# Patient Record
Sex: FEMALE | Race: WHITE | HISPANIC OR LATINO | Employment: FULL TIME | URBAN - METROPOLITAN AREA
[De-identification: names, ages, dates, MRNs, and addresses within clinical notes are randomized per-mention and may not be internally consistent; named-entity substitution may affect disease eponyms.]

---

## 2017-01-03 ENCOUNTER — TELEPHONE (OUTPATIENT)
Dept: ENDOCRINOLOGY | Facility: MEDICAL CENTER | Age: 47
End: 2017-01-03

## 2017-01-03 NOTE — TELEPHONE ENCOUNTER
----- Message from Elroy Veras M.D. sent at 1/1/2017  4:50 AM PST -----        Tell family  that her bone density is normal. It should be repeated in 1 year.    Dr Veras

## 2017-04-25 ENCOUNTER — HOSPITAL ENCOUNTER (OUTPATIENT)
Dept: RADIOLOGY | Facility: MEDICAL CENTER | Age: 47
End: 2017-04-25
Attending: NEUROLOGICAL SURGERY
Payer: COMMERCIAL

## 2017-04-25 DIAGNOSIS — D35.2 BENIGN TUMOR OF PITUITARY GLAND (HCC): ICD-10-CM

## 2017-04-25 PROCEDURE — 70553 MRI BRAIN STEM W/O & W/DYE: CPT

## 2017-05-25 LAB
ACTH PLAS-MCNC: 13.9 PG/ML (ref 7.2–63.3)
CORTIS SERPL-MCNC: 7.8 UG/DL
ESTRADIOL SERPL-MCNC: 14.5 PG/ML
FSH SERPL-ACNC: 12.6 MIU/ML
IGF-I SERPL-MCNC: 104 NG/ML (ref 57–195)
PROLACTIN SERPL-MCNC: 77.9 NG/ML (ref 4.8–23.3)
T4 FREE SERPL-MCNC: 1.01 NG/DL (ref 0.82–1.77)
TSH SERPL DL<=0.005 MIU/L-ACNC: 1.57 UIU/ML (ref 0.45–4.5)

## 2017-05-30 ENCOUNTER — OFFICE VISIT (OUTPATIENT)
Dept: ENDOCRINOLOGY | Facility: MEDICAL CENTER | Age: 47
End: 2017-05-30
Payer: COMMERCIAL

## 2017-05-30 VITALS
HEIGHT: 62 IN | OXYGEN SATURATION: 95 % | BODY MASS INDEX: 28.16 KG/M2 | WEIGHT: 153 LBS | DIASTOLIC BLOOD PRESSURE: 70 MMHG | SYSTOLIC BLOOD PRESSURE: 100 MMHG | HEART RATE: 78 BPM

## 2017-05-30 DIAGNOSIS — E23.6 PITUITARY MASS (HCC): Primary | ICD-10-CM

## 2017-05-30 DIAGNOSIS — E22.1 HYPERPROLACTINEMIA (HCC): ICD-10-CM

## 2017-05-30 PROCEDURE — 99214 OFFICE O/P EST MOD 30 MIN: CPT | Performed by: INTERNAL MEDICINE

## 2017-05-30 NOTE — MR AVS SNAPSHOT
"        Amyaubrey Ugalde   2017 4:00 PM   Office Visit   MRN: [id number]    Department:  Endocrinology [facility]   Dept Phone:  [phone]    Description:  Female : 1970   Provider:  Elroy Veras M.D.           Allergies as of 2017     No Known Allergies      You were diagnosed with     Pituitary mass (CMS-HCC)   [271677]  -  Primary     Hyperprolactinemia (CMS-HCC)   [166003]         Vital Signs     Blood Pressure Pulse Height Weight Body Mass Index Oxygen Saturation    100/70 mmHg 78 1.575 m (5' 2\") 69.4 kg (153 lb) 27.98 kg/m2 95%    Smoking Status                   Never Smoker            Basic Information     Date Of Birth Sex Race Ethnicity Preferred Language    1970 Female  or   Origin (Canadian,Ghanaian,Belgian,Damion, etc) Canadian      Your appointments     Dec 04, 2017  4:00 PM   Established Patient with Elroy Veras M.D.   [facility] End[facility]    [id number] [place] [address], [address]  [facility] [address] [phone]           You will be receiving a confirmation call a few days before your appointment from our automated call confirmation system.              Problem List              ICD-10-CM Priority Class Noted - Resolved    Major depression (CMS-[facility]) F32.9   10/3/2013 - Present    Psychosis F29   10/3/2013 - Present    Insomnia G47.00   10/30/2013 - Present    Pituitary mass (CMS-[facility]) E23.7   2012 - Present    Other schizophrenia (CMS-[facility]) F20.89   12/15/2015 - Present    Amenorrhea N91.2   2016 - Present    Bilateral nipple discharge N64.52   2016 - Present    Hyperprolactinemia (CMS-[facility]) E22.1   2017 - Present      Health Maintenance        Date Due Completion Dates    MAMMOGRAM 1/15/2017 1/15/2016, 2013 (Done), 5/10/2006    Override on 2013: Done (Daniel/NL)    PAP SMEAR 2019, 2012 (Done)    Override on 2012: Done (Daniel/NL)    IMM DTaP/Tdap/Td Vaccine (2 - Td) " 6/20/2023 6/20/2013            Current Immunizations     Influenza Vaccine Quad Inj (Preserved) 10/17/2016    MMR/Varicella Combined Vaccine  Incomplete    Tdap Vaccine  Incomplete, 6/20/2013  9:15 PM      Below and/or attached are the medications your provider expects you to take. Review all of your home medications and newly ordered medications with your provider and/or pharmacist. Follow medication instructions as directed by your provider and/or pharmacist. Please keep your medication list with you and share with your provider. Update the information when medications are discontinued, doses are changed, or new medications (including over-the-counter products) are added; and carry medication information at all times in the event of emergency situations     Allergies:  No Known Allergies          Medications  Valid as of: May 30, 2017 -  4:41 PM    Generic Name Brand Name Tablet Size Instructions for use    Citalopram Hydrobromide (Tab) CELEXA 20 MG Take 1 Tab by mouth every day. TOME JON TABLETA POR VIA ORAL TODOS LOS CORONADO        Mometasone Furoate (Suspension) NASONEX 50 MCG/ACT Spray 2 Sprays in nose every day.        RisperiDONE (Tab) RISPERDAL 2 MG Take 1 Tab by mouth every bedtime.        TraZODone HCl (Tab) DESYREL 100 MG Take 1 Tab by mouth every day. Lizeth jon antes de dormirse.        .                 Medicines prescribed today were sent to:     [facility]ARMACY #[id number] - [place], [place] - [address] [place] [address] AT [address] [address]    Phone: [phone] Fax: [phone]    Open 24 Hours?: No      Medication refill instructions:       If your prescription bottle indicates you have medication refills left, it is not necessary to call your provider’s office. Please contact your pharmacy and they will refill your medication.    If your prescription bottle indicates you do not have any refills left, you may request refills at any time through one of the following ways: The  online [employer] system (except Urgent Care), by calling your provider’s office, or by asking your pharmacy to contact your provider’s office with a refill request. Medication refills are processed only during regular business hours and may not be available until the next business day. Your provider may request additional information or to have a follow-up visit with you prior to refilling your medication.   *Please Note: Medication refills are assigned a new Rx number when refilled electronically. Your pharmacy may indicate that no refills were authorized even though a new prescription for the same medication is available at the pharmacy. Please request the medicine by name with the pharmacy before contacting your provider for a refill.        Your To Do List     Future Labs/Procedures Complete By Expires    COMP METABOLIC PANEL  As directed 11/30/2017    PROLACTIN  As directed 11/30/2017         SharePlow Access Code: Activation code not generated  Current SharePlow Status: Active

## 2017-05-31 NOTE — PROGRESS NOTES
Chief Complaint   Patient presents with   • Follow-Up     pituitary cyst / ? Louiemichelle's        HPI:  Communication is done through a family member     1.  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 She has seen Dr. Burton in the past who has elected to follow. Last MRI was done last month in April and the 10 mm lesion is unchanged          Pituitary function appears to be intact. Indeed she apparently has had 2 or 3 menstrual periods in the last few months. Current cortisol level done in the morning 7.8 and ACTH 13.9. IGF 1 normal at 104. FSH normal at 12.6 and estradiol 14.5    2.  Hyperprolactinemia           This is not being treated. It probably is related to Risperdal. Her prolactin has fluctuated over the last year and a half from 105-79-51 and now 77.9.  She has apparently had something of a menstrual cycle 2 or 3 months in the last several months. I recommended she consult her PCP or a gynecologist.    ROS:  Unable to do ROS because of the communication difficulty. I am told is that she is feeling well and doing well. She works regularly.      Allergies: No Known Allergies    Current medicines including changes today:  Current Outpatient Prescriptions   Medication Sig Dispense Refill   • mometasone (NASONEX) 50 MCG/ACT nasal spray Spray 2 Sprays in nose every day. 1 Inhaler 11   • risperidone (RISPERDAL) 2 MG Tab Take 1 Tab by mouth every bedtime. 30 Tab 2   • trazodone (DESYREL) 100 MG Tab Take 1 Tab by mouth every day. Lizeth isreal antes de dormirse. 30 Tab 2   • citalopram (CELEXA) 20 MG Tab Take 1 Tab by mouth every day. TOME ISREAL TABLETA POR VIA ORAL TODOS LOS CORONADO 30 Tab 2     No current facility-administered medications for this visit.        Past Medical History   Diagnosis Date   • Schizophrenia (CMS-HCC)    • Depression    • Pituitary lesion (CMS-HCC)      probably nonfunctional cyst / pituitary function intact   • Hyperprolactinemia (CMS-HCC)       "probably secondary to medication       PHYSICAL EXAM:    /70 mmHg  Pulse 78  Ht 1.575 m (5' 2\")  Wt 69.4 kg (153 lb)  BMI 27.98 kg/m2  SpO2 95%    Gen.   appears healthy     Skin   appropriate for sex and age    HEENT   visual fields are full to confrontation    Neck   no adenopathy    Heart  regular    Extremities  no edema    Neuro  gait and station normal    Psych  appropriate, calm, pleasant    ASSESSMENT AND RECOMMENDATIONS    1. 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  - COMP METABOLIC PANEL; Future    2. Hyperprolactinemia (CMS-HCC)              Presumably related to medication such as Risperdal.              No treatment such as cabergoline indicated              Question menses versus other uterine bleeding. Consult PCP  - PROLACTIN; Future      DISPOSITION:   Return in 6 months       Elroy Veras M.D.    Copies to: Lyn Wilson A.P.R.NKirk [phone]                    Dr. Declan Burton  "

## 2017-06-20 ENCOUNTER — OFFICE VISIT (OUTPATIENT)
Dept: MEDICAL GROUP | Facility: CLINIC | Age: 47
End: 2017-06-20
Payer: COMMERCIAL

## 2017-06-20 VITALS
HEIGHT: 62 IN | BODY MASS INDEX: 28.52 KG/M2 | DIASTOLIC BLOOD PRESSURE: 60 MMHG | SYSTOLIC BLOOD PRESSURE: 98 MMHG | TEMPERATURE: 98.2 F | HEART RATE: 66 BPM | OXYGEN SATURATION: 96 % | RESPIRATION RATE: 14 BRPM | WEIGHT: 155 LBS

## 2017-06-20 DIAGNOSIS — E22.1 HYPERPROLACTINEMIA (HCC): ICD-10-CM

## 2017-06-20 DIAGNOSIS — N91.4 SECONDARY OLIGOMENORRHEA: ICD-10-CM

## 2017-06-20 DIAGNOSIS — Z12.31 ENCOUNTER FOR SCREENING MAMMOGRAM FOR BREAST CANCER: ICD-10-CM

## 2017-06-20 DIAGNOSIS — E23.6 PITUITARY MASS (HCC): ICD-10-CM

## 2017-06-20 PROCEDURE — 99213 OFFICE O/P EST LOW 20 MIN: CPT | Performed by: NURSE PRACTITIONER

## 2017-06-20 ASSESSMENT — PATIENT HEALTH QUESTIONNAIRE - PHQ9: CLINICAL INTERPRETATION OF PHQ2 SCORE: 1

## 2017-06-20 NOTE — MR AVS SNAPSHOT
"        Amy Ugalde   2017 4:00 PM   Office Visit   MRN: [id number]    Department:  [facility]   Dept Phone:  [phone]    Description:  Female : 1970   Provider:  ORI Guy           Reason for Visit     Orders Needed mammogram    Referral Needed gynocologist      Allergies as of 2017     No Known Allergies      You were diagnosed with     Secondary oligomenorrhea   [2014]       Pituitary mass (CMS-HCC)   [754960]       Hyperprolactinemia (CMS-[facility])   [319990]       Encounter for screening mammogram for breast cancer   [[id number]]         Vital Signs     Blood Pressure Pulse Temperature Respirations Height Weight    98/60 mmHg 66 36.8 °C (98.2 °F) 14 1.575 m (5' 2.01\") 70.308 kg (155 lb)    Body Mass Index Oxygen Saturation Last Menstrual Period Breastfeeding? Smoking Status       28.34 kg/m2 96% 03/10/2017 No Never Smoker        Basic Information     Date Of Birth Sex Race Ethnicity Preferred Language    1970 Female  or   Origin (Paraguayan,Taiwanese,Cuban,Damion, etc) [place]ayan      Your appointments     Dec 04, 2017  4:00 PM   Established Patient with Elroy Veras M.D.   [facility] & Endocrinology [facility])    [address], [address]  [facility] [address] [phone]           You will be receiving a confirmation call a few days before your appointment from our automated call confirmation system.              Problem List              ICD-10-CM Priority Class Noted - Resolved    Major depression (CMS-HCC) F32.9   10/3/2013 - Present    Psychosis F29   10/3/2013 - Present    Insomnia G47.00   10/30/2013 - Present    Pituitary mass (CMS-HCC) E23.7   2012 - Present    Other schizophrenia (CMS-[facility]) F20.89   12/15/2015 - Present    Amenorrhea N91.2   2016 - Present    Bilateral nipple discharge N64.52   2016 - Present    Hyperprolactinemia (CMS-HCC) E22.1   2017 - Present      Health Maintenance    "       Date Due Completion Dates    MAMMOGRAM 1/15/2017 1/15/2016, 6/1/2013 (Done), 5/10/2006    Override on 6/1/2013: Done (Daniel/NL)    PAP SMEAR 1/6/2019 1/6/2016, 1/1/2012 (Done)    Override on 1/1/2012: Done (Daniel/NL)    IMM DTaP/Tdap/Td Vaccine (2 - Td) 6/20/2023 6/20/2013            Current Immunizations     Influenza Vaccine Quad Inj (Preserved) 10/17/2016    MMR/Varicella Combined Vaccine  Incomplete    Tdap Vaccine  Incomplete, 6/20/2013  9:15 PM      Below and/or attached are the medications your provider expects you to take. Review all of your home medications and newly ordered medications with your provider and/or pharmacist. Follow medication instructions as directed by your provider and/or pharmacist. Please keep your medication list with you and share with your provider. Update the information when medications are discontinued, doses are changed, or new medications (including over-the-counter products) are added; and carry medication information at all times in the event of emergency situations     Allergies:  No Known Allergies          Medications  Valid as of: June 20, 2017 -  5:03 PM    Generic Name Brand Name Tablet Size Instructions for use    Citalopram Hydrobromide (Tab) CELEXA 20 MG Take 1 Tab by mouth every day. TOME ISREAL TABLETA POR VIA ORAL TODOS LOS CORONADO        Mometasone Furoate (Suspension) NASONEX 50 MCG/ACT Spray 2 Sprays in nose every day.        RisperiDONE (Tab) RISPERDAL 2 MG Take 1 Tab by mouth every bedtime.        TraZODone HCl (Tab) DESYREL 100 MG Take 1 Tab by mouth every day. Lizeth isreal antes de dormirse.        .                 Medicines prescribed today were sent to:     [facility]/PHARMACY #[id number] - [place], [place] - [address] AT [address] [place] [address]    Phone: [phone] Fax: [phone]    Open 24 Hours?: No      Medication refill instructions:       If your prescription bottle indicates you have medication refills left, it  is not necessary to call your provider’s office. Please contact your pharmacy and they will refill your medication.    If your prescription bottle indicates you do not have any refills left, you may request refills at any time through one of the following ways: The online [employer] system (except Urgent Care), by calling your provider’s office, or by asking your pharmacy to contact your provider’s office with a refill request. Medication refills are processed only during regular business hours and may not be available until the next business day. Your provider may request additional information or to have a follow-up visit with you prior to refilling your medication.   *Please Note: Medication refills are assigned a new Rx number when refilled electronically. Your pharmacy may indicate that no refills were authorized even though a new prescription for the same medication is available at the pharmacy. Please request the medicine by name with the pharmacy before contacting your provider for a refill.        Your To Do List     Future Labs/Procedures Complete By Expires    MA-SCREEN MAMMO W/CAD-BILAT  As directed 7/22/2018      Referral     A referral request has been sent to our patient care coordination department. Please allow 3-5 business days for us to process this request and contact you either by phone or mail. If you do not hear from us by the 5th business day, please call us at [phone].           [employer] Access Code: Activation code not generated  Current [employer] Status: Active

## 2017-06-20 NOTE — PROGRESS NOTES
"CC: Orders Needed and Referral Needed        HPI:     Amy presents today for the followin. Secondary oligomenorrhea/Hyperprolactinemia (CMS-[facility])  Followed by Dr. Valle. Stable prolactin levels. Unknown if this is possibly related to her risperidone or from her prolactinoma.  States initially several years ago she had a complete cessation of her menstruation. However in the last several months she started having irregular menstruation. for example she had normal menstruation in March, and April she is unsure and she can remember. May she did not have menstruation period this month she spotting currently. She has a BTL and has no risk for pregnancy. Her prolactin levels are slightly decreased than from when initially measured in the beginning of 2016  pituitary mass stable      2. Pituitary mass (CMS-[facility])  Followed by Dr. Burton yearly. Stable    3. Encounter for screening mammogram for breast cancer  Requesting mammogram    Current Outpatient Prescriptions   Medication Sig Dispense Refill   • mometasone (NASONEX) 50 MCG/ACT nasal spray Spray 2 Sprays in nose every day. 1 Inhaler 11   • risperidone (RISPERDAL) 2 MG Tab Take 1 Tab by mouth every bedtime. 30 Tab 2   • trazodone (DESYREL) 100 MG Tab Take 1 Tab by mouth every day. Lizeth jon antes de dormirse. 30 Tab 2   • citalopram (CELEXA) 20 MG Tab Take 1 Tab by mouth every day. TOME JON TABLETA POR VIA ORAL TODOS LOS CORONADO 30 Tab 2     No current facility-administered medications for this visit.     Social History   Substance Use Topics   • Smoking status: Never Smoker    • Smokeless tobacco: Never Used   • Alcohol Use: No     I reviewed patients allergies, problem list and medications today in [facility].    ROS: Any/all pertinent positives listed in the HPI, otherwise all others reviewed are negative today.      BP 98/60 mmHg  Pulse 66  Temp(Src) 36.8 °C (98.2 °F)  Resp 14  Ht 1.575 m (5' 2.01\")  Wt 70.308 kg (155 lb)  BMI 28.34 kg/m2  SpO2 96%  LMP " 03/10/2017  Breastfeeding? No    Exam:    Gen: Alert and oriented, No apparent distress. WDWN  Psych: A+Ox3, normal affect and mood  Skin: Warm, dry and intact. Good turgor   No rashes in visible areas.  Eye: Conjunctiva clear, lids normal  ENMT: Lips without lesions, good dentition  Lungs: Unlabored respiratory effort.         Assessment and Plan.   47 y.o. female with the following issues.    1. Secondary oligomenorrhea  Stable. Referral to gynecology to manage her irregular menstruation  - REFERRAL TO GYNECOLOGY    2. Pituitary mass (CMS-HCC)  Stable. Continue care with Dr. Burton as routine  - REFERRAL TO GYNECOLOGY    3. Hyperprolactinemia (CMS-HCC)  Stable. Continue care with endocrinology as routine  - REFERRAL TO GYNECOLOGY    4. Encounter for screening mammogram for breast cancer  Ordered  - MA-SCREEN MAMMO W/CAD-BILAT; Future

## 2017-07-05 ENCOUNTER — TELEPHONE (OUTPATIENT)
Dept: MEDICAL GROUP | Facility: CLINIC | Age: 47
End: 2017-07-05

## 2017-07-05 DIAGNOSIS — E22.1 HYPERPROLACTINEMIA (HCC): ICD-10-CM

## 2017-07-05 DIAGNOSIS — N91.4 SECONDARY OLIGOMENORRHEA: ICD-10-CM

## 2017-07-05 NOTE — TELEPHONE ENCOUNTER
VOICEMAIL  1. Caller Name: Not given, female voice just stated she was calling for Amy Uaglde,        Call Back Number: [phone] (home)     2. Message: Pt needs referral to GYN as Dr Lao is no longer with [facility] and needs a new GYN please.     3. Patient approves office to leave a detailed voicemail/MyChart message: N\A    4. Reason for GYN not given so I entered the same dx's used from previous referral.

## 2017-07-19 ENCOUNTER — HOSPITAL ENCOUNTER (OUTPATIENT)
Dept: RADIOLOGY | Facility: MEDICAL CENTER | Age: 47
End: 2017-07-19
Attending: NURSE PRACTITIONER
Payer: COMMERCIAL

## 2017-07-19 DIAGNOSIS — Z12.31 ENCOUNTER FOR SCREENING MAMMOGRAM FOR BREAST CANCER: ICD-10-CM

## 2017-07-19 PROCEDURE — G0202 SCR MAMMO BI INCL CAD: HCPCS

## 2017-08-23 ENCOUNTER — OFFICE VISIT (OUTPATIENT)
Dept: URGENT CARE | Facility: PHYSICIAN GROUP | Age: 47
End: 2017-08-23
Payer: COMMERCIAL

## 2017-08-23 ENCOUNTER — HOSPITAL ENCOUNTER (OUTPATIENT)
Facility: MEDICAL CENTER | Age: 47
End: 2017-08-23
Attending: FAMILY MEDICINE
Payer: COMMERCIAL

## 2017-08-23 VITALS
SYSTOLIC BLOOD PRESSURE: 96 MMHG | DIASTOLIC BLOOD PRESSURE: 60 MMHG | BODY MASS INDEX: 28.71 KG/M2 | OXYGEN SATURATION: 97 % | RESPIRATION RATE: 16 BRPM | WEIGHT: 156 LBS | HEART RATE: 76 BPM | HEIGHT: 62 IN | TEMPERATURE: 98 F

## 2017-08-23 DIAGNOSIS — N30.00 ACUTE CYSTITIS WITHOUT HEMATURIA: ICD-10-CM

## 2017-08-23 LAB
APPEARANCE UR: CLEAR
BILIRUB UR STRIP-MCNC: ABNORMAL MG/DL
CANDIDA DNA VAG QL PROBE+SIG AMP: POSITIVE
COLOR UR AUTO: YELLOW
G VAGINALIS DNA VAG QL PROBE+SIG AMP: POSITIVE
GLUCOSE UR STRIP.AUTO-MCNC: ABNORMAL MG/DL
KETONES UR STRIP.AUTO-MCNC: ABNORMAL MG/DL
LEUKOCYTE ESTERASE UR QL STRIP.AUTO: ABNORMAL
NITRITE UR QL STRIP.AUTO: ABNORMAL
PH UR STRIP.AUTO: 6.5 [PH] (ref 5–8)
PROT UR QL STRIP: 100 MG/DL
RBC UR QL AUTO: ABNORMAL
SP GR UR STRIP.AUTO: 1.01
T VAGINALIS DNA VAG QL PROBE+SIG AMP: NEGATIVE
UROBILINOGEN UR STRIP-MCNC: 0.2 MG/DL

## 2017-08-23 PROCEDURE — 87660 TRICHOMONAS VAGIN DIR PROBE: CPT

## 2017-08-23 PROCEDURE — 81002 URINALYSIS NONAUTO W/O SCOPE: CPT | Performed by: FAMILY MEDICINE

## 2017-08-23 PROCEDURE — 87510 GARDNER VAG DNA DIR PROBE: CPT

## 2017-08-23 PROCEDURE — 99000 SPECIMEN HANDLING OFFICE-LAB: CPT | Performed by: FAMILY MEDICINE

## 2017-08-23 PROCEDURE — 87491 CHLMYD TRACH DNA AMP PROBE: CPT

## 2017-08-23 PROCEDURE — 99214 OFFICE O/P EST MOD 30 MIN: CPT | Performed by: FAMILY MEDICINE

## 2017-08-23 PROCEDURE — 87480 CANDIDA DNA DIR PROBE: CPT

## 2017-08-23 PROCEDURE — 87591 N.GONORRHOEAE DNA AMP PROB: CPT

## 2017-08-23 RX ORDER — NITROFURANTOIN 25; 75 MG/1; MG/1
100 CAPSULE ORAL EVERY 12 HOURS
Qty: 10 CAP | Refills: 0 | Status: SHIPPED | OUTPATIENT
Start: 2017-08-23 | End: 2017-08-28

## 2017-08-24 ENCOUNTER — TELEPHONE (OUTPATIENT)
Dept: URGENT CARE | Facility: PHYSICIAN GROUP | Age: 47
End: 2017-08-24

## 2017-08-24 LAB
C TRACH DNA SPEC QL NAA+PROBE: NEGATIVE
N GONORRHOEA DNA SPEC QL NAA+PROBE: NEGATIVE
SPECIMEN SOURCE: NORMAL

## 2017-08-24 NOTE — PROGRESS NOTES
Subjective:      Chief Complaint   Patient presents with   • Vaginal Discharge     1 week              Vaginitis  The patient's primary symptoms include genital itching. This is a new problem. The current episode started in the past 7 days. The problem occurs constantly. The problem has been gradually worsening. The pain is mild. Associated symptoms include:  None.  Pertinent negatives include no diarrhea, fever, flank pain, headaches or nausea. The symptoms are aggravated by intercourse. She has tried nothing for the symptoms. She is not sexually active.      LMP - she does not have periods secondary to pituitary tumor        History reviewed. No pertinent past medical history.         Past Medical History   Diagnosis Date   • Schizophrenia (CMS-HCC)    • Depression    • Pituitary lesion (CMS-HCC)      probably nonfunctional cyst / pituitary function intact   • Hyperprolactinemia (CMS-HCC)      probably secondary to medication       Social History     Social History   • Marital Status:      Spouse Name: N/A   • Number of Children: N/A   • Years of Education: N/A     Social History Main Topics   • Smoking status: Never Smoker    • Smokeless tobacco: Never Used   • Alcohol Use: No   • Drug Use: No   • Sexual Activity: Not Currently     Birth Control/ Protection: Surgical      Comment: BTL'     Other Topics Concern   • Not on file     Social History Narrative       Current Outpatient Prescriptions on File Prior to Visit   Medication Sig Dispense Refill   • mometasone (NASONEX) 50 MCG/ACT nasal spray Spray 2 Sprays in nose every day. 1 Inhaler 11   • risperidone (RISPERDAL) 2 MG Tab Take 1 Tab by mouth every bedtime. 30 Tab 2   • trazodone (DESYREL) 100 MG Tab Take 1 Tab by mouth every day. Lizeth jon antes de dormirse. 30 Tab 2   • citalopram (CELEXA) 20 MG Tab Take 1 Tab by mouth every day. TOME JON TABLETA POR VIA ORAL TODOS LOS CORONADO 30 Tab 2     No current facility-administered medications on file prior  "to visit.           Review of Systems   Constitutional: Negative for fever.   Respiratory: Negative for shortness of breath.    Cardiovascular: Negative for chest pain.   Gastrointestinal: Negative for nausea and diarrhea.   Genitourinary: Positive for vaginal pain. Negative for flank pain.   Neurological: Negative for dizziness and headaches.   All other systems reviewed and are negative.         Objective:     Blood pressure 96/60, pulse 76, temperature 36.7 °C (98 °F), resp. rate 16, height 1.575 m (5' 2.01\"), weight 70.761 kg (156 lb), SpO2 97 %.      Physical Exam   Constitutional: She is oriented to person, place, and time. She appears well-developed and well-nourished. No distress.   HENT:   Head: Normocephalic and atraumatic.   Eyes: Conjunctivae are normal.   Cardiovascular: Normal rate and regular rhythm.    Pulmonary/Chest: Effort normal and breath sounds normal.   Abdominal: Soft. She exhibits no distension. There is no tenderness.   Genitourinary: Pelvic exam was performed with patient supine. There is no rash on the right labia. There is no rash on the left labia. Cervix exhibits no motion tenderness. No erythema in the vagina. No signs of injury around the vagina. Scant, clear vaginal discharge found.   Neurological: She is alert and oriented to person, place, and time.   Skin: Skin is warm. She is not diaphoretic. No erythema.   Psychiatric: Her behavior is normal.   Nursing note and vitals reviewed.         Lab Results   Component Value Date/Time    POC COLOR Yellow 08/23/2017 03:39 PM    POC APPEARANCE Clear 08/23/2017 03:39 PM    POC LEUKOCYTE ESTERASE Large 08/23/2017 03:39 PM    POC NITRITES Neg 08/23/2017 03:39 PM    POC UROBILIGEN 0.2 08/23/2017 03:39 PM    POC PROTEIN 100 08/23/2017 03:39 PM    POC URINE PH 6.5 08/23/2017 03:39 PM    POC BLOOD Neg 08/23/2017 03:39 PM    POC SPECIFIC GRAVITY 1.010 08/23/2017 03:39 PM    POC KETONES Neg 08/23/2017 03:39 PM    POC BILIRUBEN Neg 08/23/2017 03:39 " PM    POC GLUCOSE Neg 08/23/2017 03:39 PM           Assessment/Plan:     1. Acute cystitis without hematuria     - nitrofurantoin monohydr macro (MACROBID) 100 MG Cap; Take 1 Cap by mouth every 12 hours for 5 days.  Dispense: 10 Cap; Refill: 0  - VAGINAL PATHOGENS DNA PANEL; Future  - CHLAMYDIA/GC PCR URINE OR SWAB; Future    Follow up in one week if no improvement, sooner if symptoms worsen.

## 2017-08-25 ENCOUNTER — TELEPHONE (OUTPATIENT)
Dept: URGENT CARE | Facility: PHYSICIAN GROUP | Age: 47
End: 2017-08-25

## 2017-08-25 DIAGNOSIS — B96.89 BV (BACTERIAL VAGINOSIS): ICD-10-CM

## 2017-08-25 DIAGNOSIS — N76.0 BV (BACTERIAL VAGINOSIS): ICD-10-CM

## 2017-08-25 RX ORDER — FLUCONAZOLE 150 MG/1
150 TABLET ORAL
Qty: 3 TAB | Refills: 0 | Status: SHIPPED | OUTPATIENT
Start: 2017-08-25 | End: 2021-04-09

## 2017-08-25 RX ORDER — METRONIDAZOLE 500 MG/1
500 TABLET ORAL EVERY 12 HOURS
Qty: 14 TAB | Refills: 0 | Status: SHIPPED | OUTPATIENT
Start: 2017-08-25 | End: 2017-09-01

## 2017-08-28 ENCOUNTER — TELEPHONE (OUTPATIENT)
Dept: URGENT CARE | Facility: PHYSICIAN GROUP | Age: 47
End: 2017-08-28

## 2017-08-29 NOTE — TELEPHONE ENCOUNTER
Pt returned our call and I gave her the results from Dr. Liriano and let her know that there were two prescriptions waiting for her at the pharmacy. She understands that she needs to pick them up and start taking them.

## 2017-09-18 ENCOUNTER — GYNECOLOGY VISIT (OUTPATIENT)
Dept: OBGYN | Facility: CLINIC | Age: 47
End: 2017-09-18
Payer: COMMERCIAL

## 2017-09-18 VITALS
BODY MASS INDEX: 28.71 KG/M2 | HEIGHT: 62 IN | SYSTOLIC BLOOD PRESSURE: 100 MMHG | WEIGHT: 156 LBS | DIASTOLIC BLOOD PRESSURE: 66 MMHG

## 2017-09-18 DIAGNOSIS — N91.5 OLIGOMENORRHEA: ICD-10-CM

## 2017-09-18 PROCEDURE — 99203 OFFICE O/P NEW LOW 30 MIN: CPT | Performed by: OBSTETRICS & GYNECOLOGY

## 2017-09-18 NOTE — PROGRESS NOTES
Chief complaint; new patient    Amy Ugalde is a 47 y.o.  who presents complaining of irregular menstrual cycles the patient is to have menstrual cycles every month 4 years ago that she was started on risperidone for bipolar disorder. In addition the patient had been diagnosed in the past with a pituitary tumor measuring 1 cm she is currently followed by Dr Glory Burton from neurosurgery as well as Dr. Valle from endocrinology. The tumor is not suspected to be a prolactin secreting adenoma but it has been stable in size and has no other side effects such as pressure on the optic chiasm the patient states her menstrual cycles stopped for several years then restarted  and March no menstrual cycles until July then she had menstrual cycle in July and August. The patient has bilateral nipple discharge    Review of systems; denies fever chills abdominal pain, denies chest pain shortness of breath or urinary symptoms  Past medical history-  Past Medical History:   Diagnosis Date   • Depression    • Hyperprolactinemia (CMS-HCC)     probably secondary to medication   • Pituitary lesion (CMS-HCC)     probably nonfunctional cyst / pituitary function intact   • Schizophrenia (CMS-HCC)      Past surgical history-  Past Surgical History:   Procedure Laterality Date   • REPEAT C SECTION W TUBAL LIGATION  2013    Performed by Marylu Sanchez M.D. at [facility]   • GYN SURGERY      csection   • PELVISCOPY      for fertility   • PRIMARY C SECTION       Allergies-Review of patient's allergies indicates no known allergies.  Medications-  Current Outpatient Prescriptions on File Prior to Visit   Medication Sig Dispense Refill   • mometasone (NASONEX) 50 MCG/ACT nasal spray Spray 2 Sprays in nose every day. 1 Inhaler 11   • risperidone (RISPERDAL) 2 MG Tab Take 1 Tab by mouth every bedtime. 30 Tab 2   • trazodone (DESYREL) 100 MG Tab Take 1 Tab by mouth every day. Lizeth isreal antes de  "dormirse. 30 Tab 2   • citalopram (CELEXA) 20 MG Tab Take 1 Tab by mouth every day. TOME JON TABLETA POR VIA ORAL TODOS LOS CORONADO 30 Tab 2   • fluconazole (DIFLUCAN) 150 MG tablet Take 1 Tab by mouth every 48 hours. 3 Tab 0     No current facility-administered medications on file prior to visit.      Social history-  Social History     Social History   • Marital status:      Spouse name: N/A   • Number of children: N/A   • Years of education: N/A     Occupational History   • Not on file.     Social History Main Topics   • Smoking status: Never Smoker   • Smokeless tobacco: Never Used   • Alcohol use No   • Drug use: No   • Sexual activity: Not Currently     Birth control/ protection: Surgical      Comment: BTL'     Other Topics Concern   • Not on file     Social History Narrative   • No narrative on file     Past Family History-no history of breast or ovarian cancer    Physical examination;  Alert and oriented x3  General a thin well-developed well-nourished female in no apparent distress  Vitals:    09/18/17 1516   BP: 100/66   Weight: 70.8 kg (156 lb)   Height: 1.575 m (5' 2\")     Skin is warm and dry  Neck-supple  HEENT-head-atraumatic, normocephalic, EOMI, PERRLA  Cardiovascular-regular rate and rhythm, normal S1-S2, no murmurs or gallops  Lungs-clear to auscultation bilaterally  Back-negative CVA tenderness  Abdomen-nondistended positive bowel sounds soft nontender no masses or hepatosplenomegaly  Pelvic examination;  External genitalia-no visible lesions   Vagina-no blood or discharge  Cervix-no gross lesions  Uterus-normal size and shape, nontender  Adnexa without mass or tenderness  Extremities without cyanosis clubbing or edema  Neurologic exam grossly intact    Impression;  Oligomenorrhea-likely secondary to hyperprolactinemia from risperidone also the patient may be perimenopausal  Pituitary tumor-followed by neurosurgery    Plan;  Reassurance-patient has many questions regarding menstrual " cycles  Check FSH  Check prolactin level-Fasting a.m.  Patient needs annual      30  Minutes spent with the patient in face-to-face contact, greater than 50% of the time spent on counseling and coordination of care. All questions answered in detail.

## 2017-09-22 ENCOUNTER — HOSPITAL ENCOUNTER (OUTPATIENT)
Dept: LAB | Facility: MEDICAL CENTER | Age: 47
End: 2017-09-22
Attending: OBSTETRICS & GYNECOLOGY
Payer: COMMERCIAL

## 2017-09-22 DIAGNOSIS — N91.5 OLIGOMENORRHEA: ICD-10-CM

## 2017-09-22 LAB
FSH SERPL-ACNC: 7 MIU/ML
PROLACTIN SERPL-MCNC: 73.84 NG/ML (ref 2.8–26)

## 2017-09-22 PROCEDURE — 36415 COLL VENOUS BLD VENIPUNCTURE: CPT

## 2017-09-22 PROCEDURE — 84146 ASSAY OF PROLACTIN: CPT

## 2017-09-22 PROCEDURE — 83001 ASSAY OF GONADOTROPIN (FSH): CPT

## 2017-10-25 ENCOUNTER — TELEPHONE (OUTPATIENT)
Dept: MEDICAL GROUP | Facility: CLINIC | Age: 47
End: 2017-10-25

## 2017-10-25 DIAGNOSIS — R09.81 NASAL CONGESTION: ICD-10-CM

## 2017-10-25 NOTE — TELEPHONE ENCOUNTER
1. Caller Name: Amy Ugalde                                         Call Back Number: [phone] (home)       Patient approves a detailed voicemail message: yes    2. SPECIFIC Action To Be Taken: Referral pending, please sign.    3. Diagnosis/Clinical Reason for Request: NASAL CONGESTION    4. Specialty & Provider Name/Lab/Imaging Location: N/A    5. Is appointment scheduled for requested order/referral: no    Patient informed they will receive a return phone call from the office ONLY if there are any questions before processing their request. Advised to call back if they haven't received a call from the referral department in 5 days.

## 2017-11-10 ENCOUNTER — GYNECOLOGY VISIT (OUTPATIENT)
Dept: OBGYN | Facility: CLINIC | Age: 47
End: 2017-11-10
Payer: COMMERCIAL

## 2017-11-10 ENCOUNTER — HOSPITAL ENCOUNTER (OUTPATIENT)
Facility: MEDICAL CENTER | Age: 47
End: 2017-11-10
Attending: OBSTETRICS & GYNECOLOGY
Payer: COMMERCIAL

## 2017-11-10 VITALS
WEIGHT: 157 LBS | DIASTOLIC BLOOD PRESSURE: 62 MMHG | HEIGHT: 62 IN | BODY MASS INDEX: 28.89 KG/M2 | SYSTOLIC BLOOD PRESSURE: 102 MMHG

## 2017-11-10 DIAGNOSIS — Z11.51 ENCOUNTER FOR SCREENING FOR HUMAN PAPILLOMAVIRUS (HPV): ICD-10-CM

## 2017-11-10 DIAGNOSIS — Z01.419 WELL WOMAN EXAM: ICD-10-CM

## 2017-11-10 DIAGNOSIS — Z12.4 ROUTINE CERVICAL SMEAR: ICD-10-CM

## 2017-11-10 PROCEDURE — 88175 CYTOPATH C/V AUTO FLUID REDO: CPT

## 2017-11-10 PROCEDURE — 99396 PREV VISIT EST AGE 40-64: CPT | Performed by: OBSTETRICS & GYNECOLOGY

## 2017-11-10 PROCEDURE — 87624 HPV HI-RISK TYP POOLED RSLT: CPT

## 2017-11-11 LAB
CYTOLOGY REG CYTOL: NORMAL
HPV HR 12 DNA CVX QL NAA+PROBE: NEGATIVE
HPV16 DNA SPEC QL NAA+PROBE: NEGATIVE
HPV18 DNA SPEC QL NAA+PROBE: NEGATIVE
SPECIMEN SOURCE: NORMAL

## 2017-11-11 NOTE — PROGRESS NOTES
" Amy Noble y.o. , C/S x 2 ,  x 2  female presents for Annual Well Woman Exam.    ROS: Patient is feeling well. No dyspnea or chest pain on exertion. No Abdominal pain, change in bowel habits, black or bloody stools. No urinary sx. GYN ROS:no breast pain or new or enlarging lumps on self exam, she complains of long term oligo-amenorrhea , wih some intermittent bleeding . Patient as well noted to have galactorrhea, but despite elevated PRL , no treatment as of yet given . Patient is on Anti -psychotics . Positive breast symptoms: discharge white, bilaterally No neurological complaints.  Past Medical History:   Diagnosis Date   • Depression    • Hyperprolactinemia (CMS-HCC)     probably secondary to medication   • Pituitary lesion (CMS-HCC)     probably nonfunctional cyst / pituitary function intact   • Schizophrenia (CMS-HCC)      Laparoscopy   Allergy:   Review of patient's allergies indicates no known allergies.    LMP:       Patient's last menstrual period was 10/18/2017. .     Menstrual History: menses irregular:   Contraceptive Method:tubal ligation      Objective : The patient appears well, alert and oriented x 3, in no acute distress.  Blood pressure 102/62, height 1.575 m (5' 2\"), weight 71.2 kg (157 lb), last menstrual period 10/18/2017.  HEENT Exam: EOMI, PRISCA, no adenopathy or thyromegaly.  Lungs: Clear to Auscultation and Percussion.  Cor: S1 and S2 normal, no murmurs, or rubs   Abdomen: Soft without tenderness, guarding mass or organomegaly.  Extremities: No edema, pulses equal  Neurological: Normal No focal signs  Breast Exam:positive findings: nipple discharge bilaterally, fibrocystic changes  Pelvic: External genitalia,urethral meatus, urethra, bladder and vagina atrophic changes . Cervix, uterus and adnexa intact and normal.  Anus and perineum normal. Bimanual and rectovaginal without masses or tenderness.    Lab:No results found for this or any previous visit (from the past 336 " hour(s)).  Results for BAYRON ROBIN (MRN [id number]) as of 11/10/2017 16:52   Ref. Range 11/21/2016 09:09 5/23/2017 07:57 9/22/2017 07:26   Follicle Stimulating Hormone Latest Units: mIU/mL 5.4 12.6 7.0   Prolactin Latest Ref Range: 2.80 - 26.00 ng/mL 51.9 (H) 77.9 (H) 73.84 (H)   Estradiol-E2 Latest Units: pg/mL 37.2 14.5    IGF-1 (Somat) Latest Ref Range: 57 - 195 ng/mL 84 104    ACTH Plasma Latest Ref Range: 7.2 - 63.3 pg/mL 16.5 13.9      Assessment:  well woman  oligo-amenorrhea   Galactorrhea   Pituitary not diagnostic for adenoma   Evidence of - Atrophy from Estrogen deficicency , secondary to elvated PRL   Plan:mammogram  pap smear  return annually or prn  Self Breast Exams  Contraception:tubal ligation  Patient to see endocrinologist . Would suggest trial parlodel , to raise ovulatory function

## 2017-11-13 DIAGNOSIS — E23.6 PITUITARY MASS (HCC): ICD-10-CM

## 2017-11-13 DIAGNOSIS — E22.1 HYPERPROLACTINEMIA (HCC): ICD-10-CM

## 2017-11-13 DIAGNOSIS — N91.2 AMENORRHEA: ICD-10-CM

## 2017-11-28 ENCOUNTER — HOSPITAL ENCOUNTER (OUTPATIENT)
Dept: LAB | Facility: MEDICAL CENTER | Age: 47
End: 2017-11-28
Attending: INTERNAL MEDICINE
Payer: COMMERCIAL

## 2017-11-28 DIAGNOSIS — E23.6 PITUITARY MASS (HCC): ICD-10-CM

## 2017-11-28 DIAGNOSIS — E22.1 HYPERPROLACTINEMIA (HCC): ICD-10-CM

## 2017-11-28 LAB
ALBUMIN SERPL BCP-MCNC: 4.1 G/DL (ref 3.2–4.9)
ALBUMIN/GLOB SERPL: 1.1 G/DL
ALP SERPL-CCNC: 83 U/L (ref 30–99)
ALT SERPL-CCNC: 20 U/L (ref 2–50)
ANION GAP SERPL CALC-SCNC: 5 MMOL/L (ref 0–11.9)
AST SERPL-CCNC: 23 U/L (ref 12–45)
BILIRUB SERPL-MCNC: 0.3 MG/DL (ref 0.1–1.5)
BUN SERPL-MCNC: 11 MG/DL (ref 8–22)
CALCIUM SERPL-MCNC: 9.6 MG/DL (ref 8.5–10.5)
CHLORIDE SERPL-SCNC: 106 MMOL/L (ref 96–112)
CO2 SERPL-SCNC: 26 MMOL/L (ref 20–33)
CREAT SERPL-MCNC: 0.58 MG/DL (ref 0.5–1.4)
GFR SERPL CREATININE-BSD FRML MDRD: >60 ML/MIN/1.73 M 2
GLOBULIN SER CALC-MCNC: 3.8 G/DL (ref 1.9–3.5)
GLUCOSE SERPL-MCNC: 98 MG/DL (ref 65–99)
POTASSIUM SERPL-SCNC: 4 MMOL/L (ref 3.6–5.5)
PROLACTIN SERPL-MCNC: 58.77 NG/ML (ref 2.8–26)
PROT SERPL-MCNC: 7.9 G/DL (ref 6–8.2)
SODIUM SERPL-SCNC: 137 MMOL/L (ref 135–145)

## 2017-11-28 PROCEDURE — 84146 ASSAY OF PROLACTIN: CPT

## 2017-11-28 PROCEDURE — 36415 COLL VENOUS BLD VENIPUNCTURE: CPT

## 2017-11-28 PROCEDURE — 80053 COMPREHEN METABOLIC PANEL: CPT

## 2017-12-04 ENCOUNTER — OFFICE VISIT (OUTPATIENT)
Dept: ENDOCRINOLOGY | Facility: MEDICAL CENTER | Age: 47
End: 2017-12-04
Payer: COMMERCIAL

## 2017-12-04 VITALS
SYSTOLIC BLOOD PRESSURE: 106 MMHG | OXYGEN SATURATION: 98 % | DIASTOLIC BLOOD PRESSURE: 78 MMHG | WEIGHT: 158 LBS | HEART RATE: 72 BPM | BODY MASS INDEX: 29.08 KG/M2 | HEIGHT: 62 IN

## 2017-12-04 DIAGNOSIS — E23.6 PITUITARY MASS (HCC): ICD-10-CM

## 2017-12-04 DIAGNOSIS — E22.1 HYPERPROLACTINEMIA (HCC): ICD-10-CM

## 2017-12-04 PROCEDURE — 99214 OFFICE O/P EST MOD 30 MIN: CPT | Performed by: INTERNAL MEDICINE

## 2017-12-05 ENCOUNTER — TELEPHONE (OUTPATIENT)
Dept: ENDOCRINOLOGY | Facility: MEDICAL CENTER | Age: 47
End: 2017-12-05

## 2017-12-05 NOTE — PROGRESS NOTES
Chief Complaint   Patient presents with   • Pituitary Adenoma     hyperprolactinemia        HPI:        1. Pituitary tumor/cyst.    Hyperprolactinemia fluctuates to some extent without any treatment.  In September prolactin was 73 and currently it is 58.  She has not been treated with cabergoline.  There is a suspicion that the risperdal has caused the hyperprolactinemia.  The 10 mm pituitary lesion is mostly cystic and not a tumor and therefore not a prolactinoma.  It is not big enough to have much of a stalk effect either.      Her menses have been very irregular.  She does have some galactorrhea.  For several years, she had no periods but now she is having intermittent periods.  She has had no period in November, did have some sort of menstrual flow in October.  She has seen Dr. Rudy Foss who has found no abnormality of the uterus or other reason for uterine bleeding.  He also does not think she is menopausal.  He did an FSH level in September of 7 so she is not menopausal.  I suppose it would be of some interest to see if cabergoline would lower her prolactin and she would start having regular menses again.  Estrogen level in May was low at 14.5 so the estrogen deficiency might be helped.      I would want to speak with her psychiatrist, Dr. Quick, before I gave her cabergoline so as not to interfere with the risperdal effect.  Maybe we should leave well enough alone.  I am not sure. If  Dr. Quick doesn’t want us to use cabergoline then perhaps Dr. Grant can cycle her and give her birth control pills to help correct her estrogen deficiency.      Perhaps a one month trial of cabergoline would be the best way to start.  I will inquire.      ROS:  Difficult to do this part because of the language barrier      Allergies: No Known Allergies    Current medicines including changes today:  Current Outpatient Prescriptions   Medication Sig Dispense Refill   • mometasone (NASONEX) 50 MCG/ACT nasal spray Spray 2 Sprays in  "nose every day. 1 Inhaler 11   • risperidone (RISPERDAL) 2 MG Tab Take 1 Tab by mouth every bedtime. 30 Tab 2   • trazodone (DESYREL) 100 MG Tab Take 1 Tab by mouth every day. Lizeth isreal antes de dormirse. 30 Tab 2   • citalopram (CELEXA) 20 MG Tab Take 1 Tab by mouth every day. TOME ISREAL TABLETA POR VIA ORAL TODOS LOS CORONADO 30 Tab 2   • fluconazole (DIFLUCAN) 150 MG tablet Take 1 Tab by mouth every 48 hours. 3 Tab 0     No current facility-administered medications for this visit.         Past Medical History:   Diagnosis Date   • Depression    • Hyperprolactinemia (CMS-HCC)     probably secondary to medication   • Pituitary lesion (CMS-HCC)     probably nonfunctional cyst / pituitary function intact   • Schizophrenia (CMS-HCC)        PHYSICAL EXAM:    /78   Pulse 72   Ht 1.575 m (5' 2\")   Wt 71.7 kg (158 lb)   SpO2 98%   BMI 28.90 kg/m²       Gen.  A little overweight but otherwise appears healthy     Skin   appropriate for sex and age    HEENT    visual fields are full to confrontation    Neck   thyroid gland I don't think is enlarged.    Heart  regular    Extremities  no edema    Neuro  gait and station normal    Psych  appropriate    ASSESSMENT AND RECOMMENDATIONS    1. Hyperprolactinemia (CMS-HCC)               Probably caused by Risperdal               Unclear if cabergoline is warranted or even contraindicated. Will discuss with her psychiatrist Dr. Quick  - PROLACTIN; Future    2. Pituitary mass (CMS-HCC)               10 mm cystic Lesion. 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 Less likely adenoma    3.  Significant psychiatric history                3 different diagnoses in the record    DISPOSITION:   Return in one month       Elroy Veras M.D.    Copies to: IZABELA GuyRKirkN. [phone]                  Dr. Brooks Foss  "

## 2017-12-06 NOTE — TELEPHONE ENCOUNTER
Telephone conversation with Dr. Quick    I asked Dr. Quick if there was a contraindication to use cabergoline in conjunction with Risperdal. He has no information that it  counteracts the effect of Risperdal.    I will discuss this with her gynecologist whether to use cabergoline to lower her prolactin and see if she begins to produce her  estrogen or simply replace HRT.    Elroy Veras M.D.

## 2017-12-07 ENCOUNTER — TELEPHONE (OUTPATIENT)
Dept: ENDOCRINOLOGY | Facility: MEDICAL CENTER | Age: 47
End: 2017-12-07

## 2017-12-07 DIAGNOSIS — E22.1 HYPERPROLACTINEMIA (HCC): Primary | ICD-10-CM

## 2017-12-07 RX ORDER — CABERGOLINE 0.5 MG/1
0.25 TABLET ORAL
Qty: 4 TAB | Refills: 3 | Status: SHIPPED | OUTPATIENT
Start: 2017-12-07 | End: 2018-01-08

## 2017-12-08 NOTE — TELEPHONE ENCOUNTER
Telephone conversation with Dr. Quick and Dr. Ramos.    The high probability is that the hyperprolactinemia is a result of Risperdal and not the small cystic pituitary lesion. My question is does using cabergoline to lower the prolactin in some way nullifies the beneficial effect of Risperdal ?  Dr. Quick does not have any information that that would be the case and has no objection to using this treatment. Dr. Ramos's primary concern is lack of estrogen effect and we should either correct that by lowering the prolactin or replacing HRT. We mutually agreed to try cabergoline for 2 or 3 months. I will start with low-dose 0.25 mg once a week and review in once a month. A problem is communication about her psychiatric problems worsening when she takes this medication.

## 2018-01-03 ENCOUNTER — HOSPITAL ENCOUNTER (OUTPATIENT)
Dept: LAB | Facility: MEDICAL CENTER | Age: 48
End: 2018-01-03
Attending: INTERNAL MEDICINE
Payer: COMMERCIAL

## 2018-01-03 DIAGNOSIS — E22.1 HYPERPROLACTINEMIA (HCC): ICD-10-CM

## 2018-01-03 LAB — PROLACTIN SERPL-MCNC: 66.17 NG/ML (ref 2.8–26)

## 2018-01-03 PROCEDURE — 84146 ASSAY OF PROLACTIN: CPT

## 2018-01-03 PROCEDURE — 36415 COLL VENOUS BLD VENIPUNCTURE: CPT

## 2018-01-08 ENCOUNTER — OFFICE VISIT (OUTPATIENT)
Dept: ENDOCRINOLOGY | Facility: MEDICAL CENTER | Age: 48
End: 2018-01-08
Payer: COMMERCIAL

## 2018-01-08 VITALS
HEIGHT: 62 IN | SYSTOLIC BLOOD PRESSURE: 106 MMHG | BODY MASS INDEX: 29.08 KG/M2 | HEART RATE: 89 BPM | OXYGEN SATURATION: 100 % | WEIGHT: 158 LBS | DIASTOLIC BLOOD PRESSURE: 74 MMHG

## 2018-01-08 DIAGNOSIS — E22.1 HYPERPROLACTINEMIA (HCC): ICD-10-CM

## 2018-01-08 PROCEDURE — 99213 OFFICE O/P EST LOW 20 MIN: CPT | Performed by: INTERNAL MEDICINE

## 2018-01-08 RX ORDER — CABERGOLINE 0.5 MG/1
0.25 TABLET ORAL
Qty: 8 TAB | Refills: 3
Start: 2018-01-08 | End: 2018-03-12

## 2018-01-09 NOTE — PROGRESS NOTES
"Chief Complaint   Patient presents with   • Hyperprolactinemia        HPI:    Hyperprolactinemia        I am working with daughter Krysten as the . The patient has taken cabergoline 0.25 mg one daily week. Prolactin level has gone up slightly from 58 up to 66. She is tolerating this dose well. We will Increase now to twice weekly and repeat the prolactin in one month and report by telephone.    ROS:   All other systems reported as negative or unchanged since last exam      Allergies: No Known Allergies    Current medicines including changes today:  Current Outpatient Prescriptions   Medication Sig Dispense Refill   • cabergoline (DOSTINEX) 0.5 MG tablet Take 0.5 Tabs by mouth 2X A WEEK. 8 Tab 3   • mometasone (NASONEX) 50 MCG/ACT nasal spray Spray 2 Sprays in nose every day. 1 Inhaler 11   • risperidone (RISPERDAL) 2 MG Tab Take 1 Tab by mouth every bedtime. 30 Tab 2   • trazodone (DESYREL) 100 MG Tab Take 1 Tab by mouth every day. Lizeth jon antes de dormirse. 30 Tab 2   • citalopram (CELEXA) 20 MG Tab Take 1 Tab by mouth every day. TOME JON TABLETA POR VIA ORAL TODOS LOS CORONADO 30 Tab 2   • fluconazole (DIFLUCAN) 150 MG tablet Take 1 Tab by mouth every 48 hours. 3 Tab 0     No current facility-administered medications for this visit.         Past Medical History:   Diagnosis Date   • Depression    • Hyperprolactinemia (CMS-HCC)     probably secondary to medication   • Pituitary lesion (CMS-HCC)     probably nonfunctional cyst / pituitary function intact   • Schizophrenia (CMS-HCC)        PHYSICAL EXAM:    /74   Pulse 89   Ht 1.575 m (5' 2\")   Wt 71.7 kg (158 lb)   SpO2 100%   BMI 28.90 kg/m²      Gen.   appears healthy     Skin   appropriate for sex and age    HEENT  unremarkable, visual fields full to confrontation    Neck   thyroid gland is small palpable    Heart  regular    Extremities  no edema    Neuro  gait and station normal    Psych  appropriate    ASSESSMENT AND RECOMMENDATIONS    1. " Hyperprolactinemia (CMS-HCC)           Increase cabergoline to 0.25 mg twice weekly and repeat prolactin in one month. Daughter Krysten will follow-up by telephone  - PROLACTIN; Future     2.  Presumptive nonfunctional pituitary cyst    DISPOSITION: Return in 2 months       Elroy Veras M.D.    Copies to: Lyn Wilson A.P.R.N. [phone]

## 2018-02-06 ENCOUNTER — HOSPITAL ENCOUNTER (OUTPATIENT)
Dept: LAB | Facility: MEDICAL CENTER | Age: 48
End: 2018-02-06
Attending: INTERNAL MEDICINE
Payer: COMMERCIAL

## 2018-02-06 DIAGNOSIS — E22.1 HYPERPROLACTINEMIA (HCC): ICD-10-CM

## 2018-02-06 LAB — PROLACTIN SERPL-MCNC: 68.16 NG/ML (ref 2.8–26)

## 2018-02-06 PROCEDURE — 84146 ASSAY OF PROLACTIN: CPT

## 2018-02-06 PROCEDURE — 36415 COLL VENOUS BLD VENIPUNCTURE: CPT

## 2018-02-10 DIAGNOSIS — E22.1 HYPERPROLACTINEMIA (HCC): ICD-10-CM

## 2018-02-12 ENCOUNTER — TELEPHONE (OUTPATIENT)
Dept: ENDOCRINOLOGY | Facility: MEDICAL CENTER | Age: 48
End: 2018-02-12

## 2018-02-12 NOTE — TELEPHONE ENCOUNTER
Pt came in and stated she had done the lab work that was requested before her phone visit.     Thank you !

## 2018-02-20 ENCOUNTER — HOSPITAL ENCOUNTER (OUTPATIENT)
Dept: HOSPITAL 8 - OUT | Age: 48
End: 2018-02-20
Attending: OTOLARYNGOLOGY
Payer: COMMERCIAL

## 2018-02-20 VITALS — DIASTOLIC BLOOD PRESSURE: 66 MMHG | SYSTOLIC BLOOD PRESSURE: 106 MMHG

## 2018-02-20 VITALS — WEIGHT: 153.88 LBS | BODY MASS INDEX: 28.32 KG/M2 | HEIGHT: 62 IN

## 2018-02-20 DIAGNOSIS — J34.2: Primary | ICD-10-CM

## 2018-02-20 DIAGNOSIS — J34.3: ICD-10-CM

## 2018-02-20 LAB — HCG UR SG: 1.03 (ref 1–1.03)

## 2018-02-20 PROCEDURE — 30520 REPAIR OF NASAL SEPTUM: CPT

## 2018-02-20 PROCEDURE — 88300 SURGICAL PATH GROSS: CPT

## 2018-02-20 PROCEDURE — 81025 URINE PREGNANCY TEST: CPT

## 2018-02-20 PROCEDURE — 30140 RESECT INFERIOR TURBINATE: CPT

## 2018-02-20 RX ADMIN — FENTANYL CITRATE PRN MCG: 50 INJECTION INTRAMUSCULAR; INTRAVENOUS at 09:24

## 2018-02-20 RX ADMIN — FENTANYL CITRATE PRN MCG: 50 INJECTION INTRAMUSCULAR; INTRAVENOUS at 09:34

## 2018-03-06 ENCOUNTER — HOSPITAL ENCOUNTER (OUTPATIENT)
Dept: LAB | Facility: MEDICAL CENTER | Age: 48
End: 2018-03-06
Attending: INTERNAL MEDICINE
Payer: COMMERCIAL

## 2018-03-06 DIAGNOSIS — E22.1 HYPERPROLACTINEMIA (HCC): ICD-10-CM

## 2018-03-06 LAB — PROLACTIN SERPL-MCNC: 38.56 NG/ML (ref 2.8–26)

## 2018-03-06 PROCEDURE — 84146 ASSAY OF PROLACTIN: CPT

## 2018-03-06 PROCEDURE — 36415 COLL VENOUS BLD VENIPUNCTURE: CPT

## 2018-03-12 ENCOUNTER — OFFICE VISIT (OUTPATIENT)
Dept: ENDOCRINOLOGY | Facility: MEDICAL CENTER | Age: 48
End: 2018-03-12
Payer: COMMERCIAL

## 2018-03-12 ENCOUNTER — HOSPITAL ENCOUNTER (OUTPATIENT)
Dept: RADIOLOGY | Facility: MEDICAL CENTER | Age: 48
End: 2018-03-12
Attending: NEUROLOGICAL SURGERY
Payer: COMMERCIAL

## 2018-03-12 VITALS
WEIGHT: 156 LBS | HEIGHT: 62 IN | HEART RATE: 90 BPM | SYSTOLIC BLOOD PRESSURE: 102 MMHG | OXYGEN SATURATION: 96 % | DIASTOLIC BLOOD PRESSURE: 66 MMHG | BODY MASS INDEX: 28.71 KG/M2

## 2018-03-12 DIAGNOSIS — N91.2 AMENORRHEA: ICD-10-CM

## 2018-03-12 DIAGNOSIS — E22.1 HYPERPROLACTINEMIA (HCC): Primary | ICD-10-CM

## 2018-03-12 DIAGNOSIS — D35.3 BENIGN NEOPLASM OF PITUITARY GLAND AND CRANIOPHARYNGEAL DUCT (POUCH) (HCC): ICD-10-CM

## 2018-03-12 DIAGNOSIS — E23.6 PITUITARY MASS (HCC): ICD-10-CM

## 2018-03-12 DIAGNOSIS — D35.2 BENIGN NEOPLASM OF PITUITARY GLAND AND CRANIOPHARYNGEAL DUCT (POUCH) (HCC): ICD-10-CM

## 2018-03-12 PROCEDURE — A9579 GAD-BASE MR CONTRAST NOS,1ML: HCPCS | Performed by: NEUROLOGICAL SURGERY

## 2018-03-12 PROCEDURE — 70553 MRI BRAIN STEM W/O & W/DYE: CPT

## 2018-03-12 PROCEDURE — 700117 HCHG RX CONTRAST REV CODE 255: Performed by: NEUROLOGICAL SURGERY

## 2018-03-12 PROCEDURE — 99213 OFFICE O/P EST LOW 20 MIN: CPT | Performed by: INTERNAL MEDICINE

## 2018-03-12 RX ORDER — CABERGOLINE 0.5 MG/1
0.5 TABLET ORAL
Qty: 8 TAB | Refills: 3 | Status: SHIPPED | OUTPATIENT
Start: 2018-03-12 | End: 2018-04-19 | Stop reason: SDUPTHER

## 2018-03-12 RX ADMIN — GADODIAMIDE 15 ML: 287 INJECTION INTRAVENOUS at 12:43

## 2018-03-13 NOTE — PROGRESS NOTES
Chief Complaint   Patient presents with   • Pituitary Adenoma      ? prolactinoma        HPI:         Pituitary Status:     Things are going very well now.  She is tolerating cabergoline .25 mg twice a week without difficulties.  No dizziness or nausea.  In particular her emotional status has remained stable.  I was concerned that cabergoline might counteract the affect of her Risperdal but so far it has not.  Her prolactin level has come down from as high as 77 down to 38.  She still has amenorrhea and she still has galactorrhea.  She does not have headache and does not have visual disturbance.    Her current MRI of her pituitary is stable, continues to show a 10 mm lesion that has the residual of probably some blood components but that has not changed either.  So I am going to press on.  We are going to increase her cabergoline to .5 mg twice a week.  I will see her in two months and we will update a complete pituitary function in addition to prolactin.  She will let me know if there is a tolerance problem with prolactin.     ROS:  All other systems reported as negative or unchanged since last exam    Allergies: No Known Allergies    Current medicines including changes today:  Current Outpatient Prescriptions   Medication Sig Dispense Refill   • cabergoline (DOSTINEX) 0.5 MG tablet Take 1 Tab by mouth every Monday and Thursday. 8 Tab 3   • fluconazole (DIFLUCAN) 150 MG tablet Take 1 Tab by mouth every 48 hours. 3 Tab 0   • mometasone (NASONEX) 50 MCG/ACT nasal spray Spray 2 Sprays in nose every day. 1 Inhaler 11   • risperidone (RISPERDAL) 2 MG Tab Take 1 Tab by mouth every bedtime. 30 Tab 2   • trazodone (DESYREL) 100 MG Tab Take 1 Tab by mouth every day. Lizeth jno antes de dormirse. 30 Tab 2   • citalopram (CELEXA) 20 MG Tab Take 1 Tab by mouth every day. TOME JON TABLETA POR VIA ORAL TODOS LOS CORONADO 30 Tab 2     No current facility-administered medications for this visit.         Past Medical History:   Diagnosis  "Date   • Depression    • Hyperprolactinemia (CMS-HCC)     probably secondary to medication   • Pituitary lesion (CMS-HCC)     probably nonfunctional cyst / pituitary function intact   • Schizophrenia (CMS-HCC)        PHYSICAL EXAM:    /66   Pulse 90   Ht 1.575 m (5' 2\")   Wt 70.8 kg (156 lb)   SpO2 96%   BMI 28.53 kg/m²      Gen.   appears healthy     Skin   appropriate for sex and age    HEENT    Visual fields are full to gross confrontation    Neck   Thyroid gland is small and difficult to palpate    Heart  Regular    Breasts     She can easily express a drop of galactorrhea from her breast    Extremities  no edema    Neuro  gait and station normal    Psych  Appropriate, calm,pleasant,    ASSESSMENT AND RECOMMENDATIONS    1. Hyperprolactinemia (CMS-HCC)             The question remains whether or not the pituitary lesion is actually a prolactinoma or not. It has definitely responded to low-dose cabergoline by reducing prolactin. Plan is to increase the dose to 0.5 mg twice weekly for 2 months and then review lab    - PROLACTIN; Future  - cabergoline (DOSTINEX) 0.5 MG tablet; Take 1 Tab by mouth every Monday and Thursday.  Dispense: 8 Tab; Refill: 3    2. Pituitary mass (CMS-HCC)             Reassess pituitary function in 2 months  - CORTISOL; Future  - ACTH; Future  - IGF-1 SOMATOMEDIN; Future  - FREE THYROXINE; Future  - TSH; Future  - PROLACTIN; Future  - FSH; Future  - BASIC METABOLIC PANEL; Future    3. Amenorrhea and galactorrhea           Presumably related to hyperprolactinemia           So far tolerating cabergoline well    - FSH; Future      DISPOSITION:  Return in 2 months       Elroy Veras M.D.    Copies to: JUANJO Guy.P.R.N. [phone]                     Dr. Brooks Foss  "

## 2018-04-19 DIAGNOSIS — E22.1 HYPERPROLACTINEMIA (HCC): ICD-10-CM

## 2018-04-19 RX ORDER — CABERGOLINE 0.5 MG/1
0.5 TABLET ORAL
Qty: 8 TAB | Refills: 3 | Status: SHIPPED | OUTPATIENT
Start: 2018-04-19 | End: 2018-08-03 | Stop reason: SDUPTHER

## 2018-05-08 ENCOUNTER — HOSPITAL ENCOUNTER (OUTPATIENT)
Dept: LAB | Facility: MEDICAL CENTER | Age: 48
End: 2018-05-08
Attending: INTERNAL MEDICINE
Payer: COMMERCIAL

## 2018-05-08 DIAGNOSIS — N91.2 AMENORRHEA: ICD-10-CM

## 2018-05-08 DIAGNOSIS — E22.1 HYPERPROLACTINEMIA (HCC): ICD-10-CM

## 2018-05-08 DIAGNOSIS — E23.6 PITUITARY MASS (HCC): ICD-10-CM

## 2018-05-08 LAB
ANION GAP SERPL CALC-SCNC: 8 MMOL/L (ref 0–11.9)
BUN SERPL-MCNC: 12 MG/DL (ref 8–22)
CALCIUM SERPL-MCNC: 9.5 MG/DL (ref 8.5–10.5)
CHLORIDE SERPL-SCNC: 106 MMOL/L (ref 96–112)
CO2 SERPL-SCNC: 23 MMOL/L (ref 20–33)
CORTIS SERPL-MCNC: 3.8 UG/DL (ref 0–23)
CREAT SERPL-MCNC: 0.69 MG/DL (ref 0.5–1.4)
FSH SERPL-ACNC: 6.5 MIU/ML
GLUCOSE SERPL-MCNC: 93 MG/DL (ref 65–99)
POTASSIUM SERPL-SCNC: 4 MMOL/L (ref 3.6–5.5)
PROLACTIN SERPL-MCNC: 26.39 NG/ML (ref 2.8–26)
SODIUM SERPL-SCNC: 137 MMOL/L (ref 135–145)
T4 FREE SERPL-MCNC: 0.83 NG/DL (ref 0.53–1.43)
TSH SERPL DL<=0.005 MIU/L-ACNC: 2.03 UIU/ML (ref 0.38–5.33)

## 2018-05-08 PROCEDURE — 84146 ASSAY OF PROLACTIN: CPT

## 2018-05-08 PROCEDURE — 82024 ASSAY OF ACTH: CPT

## 2018-05-08 PROCEDURE — 83001 ASSAY OF GONADOTROPIN (FSH): CPT

## 2018-05-08 PROCEDURE — 82533 TOTAL CORTISOL: CPT

## 2018-05-08 PROCEDURE — 84439 ASSAY OF FREE THYROXINE: CPT

## 2018-05-08 PROCEDURE — 80048 BASIC METABOLIC PNL TOTAL CA: CPT

## 2018-05-08 PROCEDURE — 84305 ASSAY OF SOMATOMEDIN: CPT

## 2018-05-08 PROCEDURE — 36415 COLL VENOUS BLD VENIPUNCTURE: CPT

## 2018-05-08 PROCEDURE — 84443 ASSAY THYROID STIM HORMONE: CPT

## 2018-05-09 LAB
ACTH PLAS-MCNC: 12 PG/ML (ref 6–58)
IGF-I SERPL-MCNC: 126 NG/ML (ref 62–243)
IGF-I Z-SCORE SERPL: -0.1

## 2018-05-15 ENCOUNTER — OFFICE VISIT (OUTPATIENT)
Dept: ENDOCRINOLOGY | Facility: MEDICAL CENTER | Age: 48
End: 2018-05-15
Payer: COMMERCIAL

## 2018-05-15 VITALS
OXYGEN SATURATION: 98 % | HEIGHT: 62 IN | SYSTOLIC BLOOD PRESSURE: 102 MMHG | HEART RATE: 76 BPM | BODY MASS INDEX: 28.71 KG/M2 | DIASTOLIC BLOOD PRESSURE: 62 MMHG | WEIGHT: 156 LBS

## 2018-05-15 DIAGNOSIS — E22.1 HYPERPROLACTINEMIA (HCC): Primary | ICD-10-CM

## 2018-05-15 DIAGNOSIS — F20.89 OTHER SCHIZOPHRENIA (HCC): ICD-10-CM

## 2018-05-15 DIAGNOSIS — E23.7 PITUITARY LESION (HCC): ICD-10-CM

## 2018-05-15 PROCEDURE — 99214 OFFICE O/P EST MOD 30 MIN: CPT | Performed by: INTERNAL MEDICINE

## 2018-05-16 NOTE — PROGRESS NOTES
Chief Complaint   Patient presents with   • Pituitary Adenoma     ? Hemorrhagic cyst / ? Prolactinoma with hemorrhage   • Hyperprolactinemia        HPI:         1. Hyperprolactinemia/pituitary lesion.    I am very pleased with the patient’s response to cabergoline.  I think she is too.  She has had some sort of menstrual flow each month since March.  Also her galactorrhea has essentially stopped.  She is taking cabergoline .5 mg twice a week and tolerates it well.  No GI symptoms.      Visual fields, I think have been checked and they are intact.  She has occasional minimal headache but nothing that I think is out of the ordinary for her.      Her last MRI was March 2018.  At that time she demonstrates a 10 mm lesion in the pituitary. The neuro radiologist speculates that this could be just hemorrhage into the pituitary or hemorrhage into the pituitary cyst or even a hemorrhage into a pituitary microadenoma.  This implies that this lesion could be a prolactinoma.  This was compared to the previous MRI of April 2017 and I don’t think there was a change.  Visual fields are intact.      Her prolactin level is now down to upper normal at 26.  TSH is 2.0 and free T4 is 0.8.  A morning cortisol is only 3.8 and her ACTH is 12.  I don’t think she has symptoms of adrenal insufficiency so I will just keep an eye on that cortisol level.  At least we can say that it is not elevated.  Overall I think she is pleased with her response as I am.     We will review again in four months.    ROS:  She is in the office with her daughter as a . However, I think she understands quite a bit of our discussion and answers some of the questions  I don't think taking cabergoline and lowering her prolactin is interfered with her psychiatric medication at all.   All other systems reported as negative or unchanged since last exam    Allergies: No Known Allergies    Current medicines including changes today:  Current Outpatient  "Prescriptions   Medication Sig Dispense Refill   • cabergoline (DOSTINEX) 0.5 MG tablet Take 1 Tab by mouth every Monday and Thursday. 8 Tab 3   • risperidone (RISPERDAL) 2 MG Tab Take 1 Tab by mouth every bedtime. 30 Tab 2   • trazodone (DESYREL) 100 MG Tab Take 1 Tab by mouth every day. Lizeth jon antes de dormirse. 30 Tab 2   • citalopram (CELEXA) 20 MG Tab Take 1 Tab by mouth every day. TOME JON TABLETA POR VIA ORAL TODOS LOS CORONADO 30 Tab 2   • fluconazole (DIFLUCAN) 150 MG tablet Take 1 Tab by mouth every 48 hours. 3 Tab 0   • mometasone (NASONEX) 50 MCG/ACT nasal spray Spray 2 Sprays in nose every day. 1 Inhaler 11     No current facility-administered medications for this visit.         Past Medical History:   Diagnosis Date   • Depression    • Hyperprolactinemia (HCC)     probably secondary to medication   • Pituitary lesion (HCC)     probably nonfunctional cyst / pituitary function intact   • Schizophrenia (HCC)        PHYSICAL EXAM:    /62   Pulse 76   Ht 1.575 m (5' 2.01\")   Wt 70.8 kg (156 lb)   SpO2 98%   BMI 28.53 kg/m²      Gen.  A little overweight but otherwise appears healthy     Skin   appropriate for sex and age    HEENT     visual fields are full to gross confrontation    Neck   thyroid gland is small and difficult to palpate    Heart  regular    Extremities  no edema    Neuro  gait and station normal    Psych  appropriate, good spirits, smiling      ASSESSMENT AND RECOMMENDATIONS    1. Pituitary lesion (HCC)             10 mm lesion of unknown etiology.              Lesion has some hemorrhage into it. The radiologist speculates it might be a cyst with hemorrhage or a microadenoma with hemorrhage  - COMP METABOLIC PANEL; Future  - PROLACTIN; Future  - ESTRADIOL; Future  - IGF-1 SOMATOMEDIN; Future  - FREE THYROXINE; Future  - TSH; Future  - CORTISOL; Future  - ACTH; Future    2. Hyperprolactinemia (HCC)          Prolactin level is now normal taking cabergoline 0.5 mg twice weekly.         "   Her menses have resumed and her galactorrhea has stopped  - PROLACTIN; Future    3. Other schizophrenia (HCC)            She seems to be quite stable now and cheerful. I don't think cabergoline has interfered with her psych meds at all      DISPOSITION: Continue same cabergoline dose and review again in 4 months.       Elroy Veras M.D.    Copies to: Lyn Wilson A.P.R.N. [phone]                   Dr. Seb Ramos

## 2018-08-03 DIAGNOSIS — E22.1 HYPERPROLACTINEMIA (HCC): ICD-10-CM

## 2018-08-03 RX ORDER — CABERGOLINE 0.5 MG/1
0.5 TABLET ORAL
Qty: 8 TAB | Refills: 3 | Status: SHIPPED | OUTPATIENT
Start: 2018-08-06 | End: 2019-01-21

## 2018-08-03 NOTE — TELEPHONE ENCOUNTER
Was the patient seen in the last year in this department? Yes      Does patient have an active prescription for medications requested? No     Received Request Via: Pharmacy     cabergoline (DOSTINEX) 0.5 MG tablet  Take 1 Tab by mouth every Monday and Thursday.

## 2018-09-11 ENCOUNTER — HOSPITAL ENCOUNTER (OUTPATIENT)
Dept: LAB | Facility: MEDICAL CENTER | Age: 48
End: 2018-09-11
Attending: INTERNAL MEDICINE
Payer: COMMERCIAL

## 2018-09-11 DIAGNOSIS — E22.1 HYPERPROLACTINEMIA (HCC): ICD-10-CM

## 2018-09-11 DIAGNOSIS — E23.7 PITUITARY LESION (HCC): ICD-10-CM

## 2018-09-11 LAB
ALBUMIN SERPL BCP-MCNC: 4.2 G/DL (ref 3.2–4.9)
ALBUMIN/GLOB SERPL: 1.2 G/DL
ALP SERPL-CCNC: 85 U/L (ref 30–99)
ALT SERPL-CCNC: 15 U/L (ref 2–50)
ANION GAP SERPL CALC-SCNC: 9 MMOL/L (ref 0–11.9)
AST SERPL-CCNC: 20 U/L (ref 12–45)
BILIRUB SERPL-MCNC: 0.3 MG/DL (ref 0.1–1.5)
BUN SERPL-MCNC: 11 MG/DL (ref 8–22)
CALCIUM SERPL-MCNC: 9.6 MG/DL (ref 8.5–10.5)
CHLORIDE SERPL-SCNC: 106 MMOL/L (ref 96–112)
CO2 SERPL-SCNC: 23 MMOL/L (ref 20–33)
CORTIS SERPL-MCNC: 14.7 UG/DL (ref 0–23)
CREAT SERPL-MCNC: 0.65 MG/DL (ref 0.5–1.4)
ESTRADIOL SERPL-MCNC: 87 PG/ML
FASTING STATUS PATIENT QL REPORTED: NORMAL
FSH SERPL-ACNC: 6.1 MIU/ML
GLOBULIN SER CALC-MCNC: 3.4 G/DL (ref 1.9–3.5)
GLUCOSE SERPL-MCNC: 97 MG/DL (ref 65–99)
POTASSIUM SERPL-SCNC: 3.8 MMOL/L (ref 3.6–5.5)
PROLACTIN SERPL-MCNC: 58.76 NG/ML (ref 2.8–26)
PROT SERPL-MCNC: 7.6 G/DL (ref 6–8.2)
SODIUM SERPL-SCNC: 138 MMOL/L (ref 135–145)
T4 FREE SERPL-MCNC: 0.74 NG/DL (ref 0.53–1.43)
TSH SERPL DL<=0.005 MIU/L-ACNC: 3.15 UIU/ML (ref 0.38–5.33)

## 2018-09-11 PROCEDURE — 80053 COMPREHEN METABOLIC PANEL: CPT

## 2018-09-11 PROCEDURE — 36415 COLL VENOUS BLD VENIPUNCTURE: CPT

## 2018-09-11 PROCEDURE — 84443 ASSAY THYROID STIM HORMONE: CPT

## 2018-09-11 PROCEDURE — 82024 ASSAY OF ACTH: CPT

## 2018-09-11 PROCEDURE — 82533 TOTAL CORTISOL: CPT

## 2018-09-11 PROCEDURE — 84146 ASSAY OF PROLACTIN: CPT

## 2018-09-11 PROCEDURE — 84439 ASSAY OF FREE THYROXINE: CPT

## 2018-09-11 PROCEDURE — 82670 ASSAY OF TOTAL ESTRADIOL: CPT

## 2018-09-11 PROCEDURE — 83001 ASSAY OF GONADOTROPIN (FSH): CPT

## 2018-09-11 PROCEDURE — 84305 ASSAY OF SOMATOMEDIN: CPT

## 2018-09-12 LAB
ACTH PLAS-MCNC: 34 PG/ML (ref 6–58)
IGF-I SERPL-MCNC: 122 NG/ML (ref 62–243)
IGF-I Z-SCORE SERPL: -0.2

## 2018-09-18 ENCOUNTER — OFFICE VISIT (OUTPATIENT)
Dept: ENDOCRINOLOGY | Facility: MEDICAL CENTER | Age: 48
End: 2018-09-18
Payer: COMMERCIAL

## 2018-09-18 VITALS
OXYGEN SATURATION: 94 % | DIASTOLIC BLOOD PRESSURE: 60 MMHG | HEART RATE: 80 BPM | SYSTOLIC BLOOD PRESSURE: 100 MMHG | RESPIRATION RATE: 16 BRPM

## 2018-09-18 DIAGNOSIS — E22.1 HYPERPROLACTINEMIA (HCC): Primary | ICD-10-CM

## 2018-09-18 DIAGNOSIS — E23.7 PITUITARY LESION (HCC): ICD-10-CM

## 2018-09-18 PROCEDURE — 99214 OFFICE O/P EST MOD 30 MIN: CPT | Performed by: INTERNAL MEDICINE

## 2018-09-18 RX ORDER — CABERGOLINE 0.5 MG/1
0.5 TABLET ORAL
Qty: 8 TAB | Refills: 5 | Status: SHIPPED | OUTPATIENT
Start: 2018-09-20 | End: 2019-01-21

## 2018-09-18 NOTE — PROGRESS NOTES
Chief Complaint   Patient presents with   • Pituitary Adenoma      ? Hemorrhagic lesion unclear etiology   • Hyperprolactinemia      ? Secondary to psychotropic drugs vs prolactinoma        HPI:         1. Pituitary lesion/hyperprolactinemia.    In 2016 the patient was discovered to have a cystic pituitary mass that seemed to have blood in it.  It was about 10 mm.  The suggestion was that this could be a Rathke’s cleft cyst with hemorrhage in it or a pituitary adenoma with hemorrhage in it.  The initial prolactin level was 105 but she also had high normal FSH of 8.2 and LH of 5.7.  She did not seem to have a fertility problem.  She did have serious problems with depression and responded favorably to risperidone.  She was also taking trazodone 100 mg a day and citalopram daily.  The cabergoline has mostly controlled galactorrhea.  She had her last pregnancy about three years prior to the discovery of this lesion.  Using cabergoline did not seem to interfere with the effectiveness of her psychotropic drugs.    Her most recent MRI was March of this year.  The same nonenhancing 10 mm mass was present unchanged.    Her most recent prolactin level is 58 and previously was 26 and 38 and in February of this year 68.      So whether this is a hemorrhagic prolactinoma or the hyperprolactinemia is due to her psychotropic drugs, I think is a moot point.  She is doing very well treating both and we are not about to discontinue either the cabergoline or her risperidone.  She agrees with that decision.  She has had a visual field check recently although I don’t have the actual report.  She tells me that visual fields are intact and she got a prescription for glasses which are helpful.  She does not have troublesome headaches.  She continues to have menstrual periods.  She continues to work regularly.      We will leave as is.  I will review her status again in four months.  Neurosurgeons have recommended that her MRI be repeated  in two years assuming everything else is unchanged.     ROS:  All other systems reported as negative or unchanged since last exam      Allergies: No Known Allergies    Current medicines including changes today:  Current Outpatient Prescriptions   Medication Sig Dispense Refill   • [START ON 9/20/2018] cabergoline (DOSTINEX) 0.5 MG tablet Take 1 Tab by mouth every Monday and Thursday. 8 Tab 5   • cabergoline (DOSTINEX) 0.5 MG tablet TAKE 1 TAB BY MOUTH EVERY MONDAY AND THURSDAY. 8 Tab 3   • mometasone (NASONEX) 50 MCG/ACT nasal spray Spray 2 Sprays in nose every day. 1 Inhaler 11   • risperidone (RISPERDAL) 2 MG Tab Take 1 Tab by mouth every bedtime. 30 Tab 2   • trazodone (DESYREL) 100 MG Tab Take 1 Tab by mouth every day. Lizeth jon antes de dormirse. 30 Tab 2   • citalopram (CELEXA) 20 MG Tab Take 1 Tab by mouth every day. TOME JON TABLETA POR VIA ORAL TODOS LOS CORONADO 30 Tab 2   • fluconazole (DIFLUCAN) 150 MG tablet Take 1 Tab by mouth every 48 hours. 3 Tab 0     No current facility-administered medications for this visit.         Past Medical History:   Diagnosis Date   • Depression    • Hyperprolactinemia (HCC)     probably secondary to medication   • Pituitary lesion (HCC)     probably nonfunctional cyst / pituitary function intact   • Schizophrenia (HCC)        PHYSICAL EXAM:    /60   Pulse 80   Resp 16   SpO2 94%     Gen.   appears healthy     Skin   appropriate for sex and age    HEENT   visual fields are full to gross confrontation    Neck   thyroid gland is small and difficult to palpate or perhaps somewhat substernal    Heart  regular    Extremities  no edema    Neuro  gait and station normal    Psych  appropriate, calm, pleasant      ASSESSMENT AND RECOMMENDATIONS    1. Pituitary lesion (HCC)           Hemorrhagic lesion that could be a Rathke's cyst or adenoma with hemorrhage.           Unchanged since 2016 and possibly seen on CT scan 2012    2. Hyperprolactinemia (HCC)              Question  related to psychotropic drugs versus prolactin secreting adenoma  - PROLACTIN; Future  - ESTRADIOL; Future  - FREE THYROXINE; Future  - TSH; Future  - cabergoline (DOSTINEX) 0.5 MG tablet; Take 1 Tab by mouth every Monday and Thursday.  Dispense: 8 Tab; Refill: 5      DISPOSITION: Return in 4 months and update lab                            Continue cabergoline 0.5 mg twice weekly         Elory Veras M.D.    Copies to: Lyn Wilson, JUANJO.P.R.N. [phone]                   Dr. Donovan, [facility]                   [facility]

## 2018-10-30 ENCOUNTER — NON-PROVIDER VISIT (OUTPATIENT)
Dept: MEDICAL GROUP | Facility: MEDICAL CENTER | Age: 48
End: 2018-10-30
Payer: COMMERCIAL

## 2018-10-30 DIAGNOSIS — Z23 NEED FOR VACCINATION: ICD-10-CM

## 2018-10-30 PROCEDURE — 90686 IIV4 VACC NO PRSV 0.5 ML IM: CPT | Performed by: NURSE PRACTITIONER

## 2018-10-30 PROCEDURE — 90471 IMMUNIZATION ADMIN: CPT | Performed by: NURSE PRACTITIONER

## 2018-10-30 NOTE — PROGRESS NOTES
"Amy Ugalde is a 48 y.o. female here for a non-provider visit for:   FLU    Reason for immunization: Annual Flu Vaccine  Immunization records indicate need for vaccine: Yes, confirmed with Epic  Minimum interval has been met for this vaccine: Yes  ABN completed: Not Indicated    Order and dose verified by: Stephanie Navarro MA  VIS Dated  08/07/15 was given to patient: Yes  All IAC Questionnaire questions were answered \"No.\"    Patient tolerated injection and no adverse effects were observed or reported: Yes    Pt scheduled for next dose in series: Yes    "

## 2018-11-27 DIAGNOSIS — Z12.31 ENCOUNTER FOR SCREENING MAMMOGRAM FOR BREAST CANCER: ICD-10-CM

## 2018-12-03 ENCOUNTER — GYNECOLOGY VISIT (OUTPATIENT)
Dept: OBGYN | Facility: CLINIC | Age: 48
End: 2018-12-03
Payer: COMMERCIAL

## 2018-12-03 ENCOUNTER — HOSPITAL ENCOUNTER (OUTPATIENT)
Facility: MEDICAL CENTER | Age: 48
End: 2018-12-03
Attending: OBSTETRICS & GYNECOLOGY
Payer: COMMERCIAL

## 2018-12-03 VITALS — BODY MASS INDEX: 29.62 KG/M2 | WEIGHT: 162 LBS | DIASTOLIC BLOOD PRESSURE: 66 MMHG | SYSTOLIC BLOOD PRESSURE: 112 MMHG

## 2018-12-03 DIAGNOSIS — Z12.4 ROUTINE CERVICAL SMEAR: ICD-10-CM

## 2018-12-03 DIAGNOSIS — Z01.419 WELL WOMAN EXAM: ICD-10-CM

## 2018-12-03 PROCEDURE — 87624 HPV HI-RISK TYP POOLED RSLT: CPT

## 2018-12-03 PROCEDURE — 88175 CYTOPATH C/V AUTO FLUID REDO: CPT

## 2018-12-03 PROCEDURE — 99396 PREV VISIT EST AGE 40-64: CPT | Performed by: OBSTETRICS & GYNECOLOGY

## 2018-12-03 NOTE — PROGRESS NOTES
Annual examination;  This patient is a 48 y.o. female  here for annual     Last mammogram-scheduled for mammogram this week    /66 (BP Location: Right arm, Patient Position: Sitting)   Wt 73.5 kg (162 lb)   LMP 2018 (Exact Date)   BMI 29.62 kg/m²     Physical examination;  Breast examination- No dominant masses, No skin retraction, No axillary adenopathy    Pelvic examination;  External genitalia-No visible lesions  Vagina-No blood or discharge  Cervix-No gross lesions, Pap smear taken  Uterus-Normal size and shape,  No tenderness  Adnexa No mass or tenderness    Impression;  Normal annual    Plan;    Check PAP  Check mammogram.

## 2018-12-04 DIAGNOSIS — Z12.4 ROUTINE CERVICAL SMEAR: ICD-10-CM

## 2018-12-04 DIAGNOSIS — Z01.419 WELL WOMAN EXAM: ICD-10-CM

## 2018-12-20 ENCOUNTER — HOSPITAL ENCOUNTER (OUTPATIENT)
Dept: RADIOLOGY | Facility: MEDICAL CENTER | Age: 48
End: 2018-12-20
Attending: NURSE PRACTITIONER
Payer: COMMERCIAL

## 2018-12-20 DIAGNOSIS — Z12.31 ENCOUNTER FOR SCREENING MAMMOGRAM FOR BREAST CANCER: ICD-10-CM

## 2018-12-20 PROCEDURE — 77067 SCR MAMMO BI INCL CAD: CPT

## 2019-01-14 ENCOUNTER — HOSPITAL ENCOUNTER (OUTPATIENT)
Dept: LAB | Facility: MEDICAL CENTER | Age: 49
End: 2019-01-14
Attending: INTERNAL MEDICINE
Payer: COMMERCIAL

## 2019-01-14 DIAGNOSIS — E22.1 HYPERPROLACTINEMIA (HCC): ICD-10-CM

## 2019-01-14 LAB
ESTRADIOL SERPL-MCNC: 254 PG/ML
PROLACTIN SERPL-MCNC: 65.77 NG/ML (ref 2.8–26)
T4 FREE SERPL-MCNC: 0.77 NG/DL (ref 0.53–1.43)
TSH SERPL DL<=0.005 MIU/L-ACNC: 2.29 UIU/ML (ref 0.38–5.33)

## 2019-01-14 PROCEDURE — 84439 ASSAY OF FREE THYROXINE: CPT

## 2019-01-14 PROCEDURE — 82670 ASSAY OF TOTAL ESTRADIOL: CPT

## 2019-01-14 PROCEDURE — 84443 ASSAY THYROID STIM HORMONE: CPT

## 2019-01-14 PROCEDURE — 84146 ASSAY OF PROLACTIN: CPT

## 2019-01-14 PROCEDURE — 36415 COLL VENOUS BLD VENIPUNCTURE: CPT

## 2019-01-21 ENCOUNTER — OFFICE VISIT (OUTPATIENT)
Dept: ENDOCRINOLOGY | Facility: MEDICAL CENTER | Age: 49
End: 2019-01-21
Payer: COMMERCIAL

## 2019-01-21 VITALS
DIASTOLIC BLOOD PRESSURE: 54 MMHG | WEIGHT: 162 LBS | HEIGHT: 62 IN | SYSTOLIC BLOOD PRESSURE: 110 MMHG | HEART RATE: 85 BPM | OXYGEN SATURATION: 95 % | BODY MASS INDEX: 29.81 KG/M2

## 2019-01-21 DIAGNOSIS — D35.2 PITUITARY ADENOMA (HCC): ICD-10-CM

## 2019-01-21 DIAGNOSIS — E22.1 HYPERPROLACTINEMIA (HCC): Primary | ICD-10-CM

## 2019-01-21 DIAGNOSIS — F32.A DEPRESSION, UNSPECIFIED DEPRESSION TYPE: ICD-10-CM

## 2019-01-21 PROCEDURE — 99214 OFFICE O/P EST MOD 30 MIN: CPT | Performed by: INTERNAL MEDICINE

## 2019-01-21 RX ORDER — CABERGOLINE 0.5 MG/1
TABLET ORAL
Qty: 12 TAB | Refills: 5 | Status: SHIPPED | OUTPATIENT
Start: 2019-01-21 | End: 2019-08-22 | Stop reason: SDUPTHER

## 2019-01-21 RX ORDER — CABERGOLINE 0.5 MG/1
TABLET ORAL
Qty: 6 TAB | Refills: 5 | Status: SHIPPED | OUTPATIENT
Start: 2019-01-21 | End: 2019-01-21

## 2019-01-23 ENCOUNTER — TELEPHONE (OUTPATIENT)
Dept: ENDOCRINOLOGY | Facility: MEDICAL CENTER | Age: 49
End: 2019-01-23

## 2019-01-24 NOTE — TELEPHONE ENCOUNTER
VOICEMAIL    1. Caller Name: Krysten Ugalde                          Call Back Number: [phone]    2. Message: Patient's daughter called and LVM stating patient's Cabergoline rx was only filled with qty 8 tabs. She will need #12 for a month supply. I saw on 1/21/19 Dr. Veras did e-scribe Cabergoline #12 to [facility]. I called [facility] pharmacy and spoke with Manju. Per Manju the bottles only come in qty 8. They are not able to open another bottle. Patient does have refills so once she needs more they just need to go to the pharmacy.I called Krysten back to let her know. She vu.     3. Patient approves office to leave a detailed voicemail/[facility]ylie the [facility] message: N\A

## 2019-03-20 ENCOUNTER — HOSPITAL ENCOUNTER (OUTPATIENT)
Dept: LAB | Facility: MEDICAL CENTER | Age: 49
End: 2019-03-20
Attending: INTERNAL MEDICINE
Payer: COMMERCIAL

## 2019-03-20 DIAGNOSIS — E22.1 HYPERPROLACTINEMIA (HCC): ICD-10-CM

## 2019-03-20 DIAGNOSIS — D35.2 PITUITARY ADENOMA (HCC): ICD-10-CM

## 2019-03-20 LAB
ALBUMIN SERPL BCP-MCNC: 4.5 G/DL (ref 3.2–4.9)
ALBUMIN/GLOB SERPL: 1.3 G/DL
ALP SERPL-CCNC: 83 U/L (ref 30–99)
ALT SERPL-CCNC: 18 U/L (ref 2–50)
ANION GAP SERPL CALC-SCNC: 7 MMOL/L (ref 0–11.9)
AST SERPL-CCNC: 21 U/L (ref 12–45)
BILIRUB SERPL-MCNC: 0.4 MG/DL (ref 0.1–1.5)
BUN SERPL-MCNC: 8 MG/DL (ref 8–22)
CALCIUM SERPL-MCNC: 9.3 MG/DL (ref 8.5–10.5)
CHLORIDE SERPL-SCNC: 104 MMOL/L (ref 96–112)
CO2 SERPL-SCNC: 26 MMOL/L (ref 20–33)
CORTIS SERPL-MCNC: 4.5 UG/DL (ref 0–23)
CREAT SERPL-MCNC: 0.6 MG/DL (ref 0.5–1.4)
FASTING STATUS PATIENT QL REPORTED: NORMAL
GLOBULIN SER CALC-MCNC: 3.4 G/DL (ref 1.9–3.5)
GLUCOSE SERPL-MCNC: 93 MG/DL (ref 65–99)
POTASSIUM SERPL-SCNC: 3.9 MMOL/L (ref 3.6–5.5)
PROLACTIN SERPL-MCNC: 31.92 NG/ML (ref 2.8–26)
PROT SERPL-MCNC: 7.9 G/DL (ref 6–8.2)
SODIUM SERPL-SCNC: 137 MMOL/L (ref 135–145)
T4 FREE SERPL-MCNC: 0.83 NG/DL (ref 0.53–1.43)
TSH SERPL DL<=0.005 MIU/L-ACNC: 1.92 UIU/ML (ref 0.38–5.33)

## 2019-03-20 PROCEDURE — 84443 ASSAY THYROID STIM HORMONE: CPT

## 2019-03-20 PROCEDURE — 84439 ASSAY OF FREE THYROXINE: CPT

## 2019-03-20 PROCEDURE — 82024 ASSAY OF ACTH: CPT

## 2019-03-20 PROCEDURE — 36415 COLL VENOUS BLD VENIPUNCTURE: CPT

## 2019-03-20 PROCEDURE — 84146 ASSAY OF PROLACTIN: CPT

## 2019-03-20 PROCEDURE — 84305 ASSAY OF SOMATOMEDIN: CPT

## 2019-03-20 PROCEDURE — 80053 COMPREHEN METABOLIC PANEL: CPT

## 2019-03-20 PROCEDURE — 82533 TOTAL CORTISOL: CPT

## 2019-03-22 LAB
ACTH PLAS-MCNC: 14 PG/ML (ref 6–58)
IGF-I SERPL-MCNC: 123 NG/ML (ref 60–240)
IGF-I Z-SCORE SERPL: -0.2

## 2019-03-25 ENCOUNTER — OFFICE VISIT (OUTPATIENT)
Dept: ENDOCRINOLOGY | Facility: MEDICAL CENTER | Age: 49
End: 2019-03-25
Payer: COMMERCIAL

## 2019-03-25 VITALS
SYSTOLIC BLOOD PRESSURE: 114 MMHG | OXYGEN SATURATION: 98 % | HEIGHT: 62 IN | HEART RATE: 81 BPM | BODY MASS INDEX: 29.08 KG/M2 | DIASTOLIC BLOOD PRESSURE: 64 MMHG | WEIGHT: 158 LBS

## 2019-03-25 DIAGNOSIS — E22.1 HYPERPROLACTINEMIA (HCC): ICD-10-CM

## 2019-03-25 DIAGNOSIS — F33.3 SEVERE EPISODE OF RECURRENT MAJOR DEPRESSIVE DISORDER, WITH PSYCHOTIC FEATURES (HCC): ICD-10-CM

## 2019-03-25 DIAGNOSIS — D35.2 PITUITARY ADENOMA (HCC): ICD-10-CM

## 2019-03-25 PROCEDURE — 99213 OFFICE O/P EST LOW 20 MIN: CPT | Performed by: INTERNAL MEDICINE

## 2019-03-25 NOTE — PROGRESS NOTES
Chief Complaint   Patient presents with   • Pituitary Adenoma     ?  Prolactinoma or cystic incidental pituitary lesion   • Hyperprolactinemia   • Depression     Effectively treated with medication notably Risperdal        HPI:        1. Hyperprolactinemia/pituitary cystic adenoma.    The issue all along has been whether or not she has a prolactinoma or a small incidental pituitary lesion and the hyperprolactinemia is due to her antidepressant, Risperdal.  This is a very important medication to her so we are not going to discontinue Risperdal.  In the beginning I felt this was a cystic lesion unrelated to the hyperprolactinemia.  Her level has come down progressively.  The highest level I have is 105 back in 2016.  It was 65 in January.  I think we reduced her cabergoline to .5 mg three times a week and her prolactin level was 31.9.  No galactorrhea .  I neglected to quiz her about her menses.  She is not having any headache, no visual disturbance and seems quite comfortable.  She obviously is not depressed and in good spirits so I think we will continue the present track.  No change in the cabergoline dose.  I would like to see her in four months but get a prolactin level in two months.  I hope she understood that.  Her thyroid levels are good with TSH 1.9 and her general chemistries are all excellent.  So I think we are on the right track, we are just not sure what we are treating with the cabergoline.      ROS:  All other systems reported as negative or unchanged since last exam      Allergies: No Known Allergies    Current medicines including changes today:  Current Outpatient Prescriptions   Medication Sig Dispense Refill   • cabergoline (DOSTINEX) 0.5 MG tablet Take 1 tablet (0.5 mg) 3 evenings per week ( Mon,Wed,Sat) 12 Tab 5   • risperidone (RISPERDAL) 2 MG Tab Take 1 Tab by mouth every bedtime. 30 Tab 2   • trazodone (DESYREL) 100 MG Tab Take 1 Tab by mouth every day. Lizeth isreal antes de dormirse. 30 Tab 2  "  • citalopram (CELEXA) 20 MG Tab Take 1 Tab by mouth every day. TOME JON TABLETA POR VIA ORAL TODOS LOS CORONADO 30 Tab 2   • fluconazole (DIFLUCAN) 150 MG tablet Take 1 Tab by mouth every 48 hours. (Patient not taking: Reported on 1/21/2019) 3 Tab 0   • mometasone (NASONEX) 50 MCG/ACT nasal spray Spray 2 Sprays in nose every day. 1 Inhaler 11     No current facility-administered medications for this visit.         Past Medical History:   Diagnosis Date   • Depression    • Hyperprolactinemia (HCC)     probably secondary to medication   • Pituitary lesion (HCC)     probably nonfunctional cyst / pituitary function intact   • Schizophrenia (HCC)        PHYSICAL EXAM:    /64 (BP Location: Right arm, Patient Position: Sitting)   Pulse 81   Ht 1.575 m (5' 2\")   Wt 71.7 kg (158 lb)   SpO2 98%   BMI 28.90 kg/m²     Gen.   appears healthy in no distress    Skin   appropriate for sex and age    HEENT visual fields are full to confrontation    Neck   thyroid gland is small difficult to palpate    Heart  regular    Extremities  no edema    Neuro  gait and station normal    Psych  appropriate, good spirits, smiling    ASSESSMENT AND RECOMMENDATIONS    1. Pituitary adenoma (HCC)             See HPI  - PROLACTIN; Standing    2. Hyperprolactinemia (HCC)               Improving with cabergoline  - PROLACTIN; Standing    3. Severe episode of recurrent major depressive disorder, with psychotic features (HCC)              Well compensated on medication      DISPOSITION: Repeat prolactin in 2 months and return to clinic in 4 months      Elroy Veras M.D.    Copies to: MAR GuyP.R.N. [phone]  "

## 2019-05-22 ENCOUNTER — HOSPITAL ENCOUNTER (OUTPATIENT)
Dept: LAB | Facility: MEDICAL CENTER | Age: 49
End: 2019-05-22
Attending: INTERNAL MEDICINE
Payer: COMMERCIAL

## 2019-05-22 DIAGNOSIS — E22.1 HYPERPROLACTINEMIA (HCC): ICD-10-CM

## 2019-05-22 DIAGNOSIS — D35.2 PITUITARY ADENOMA (HCC): ICD-10-CM

## 2019-05-22 LAB — PROLACTIN SERPL-MCNC: 17.71 NG/ML (ref 2.8–26)

## 2019-05-22 PROCEDURE — 36415 COLL VENOUS BLD VENIPUNCTURE: CPT

## 2019-05-22 PROCEDURE — 84146 ASSAY OF PROLACTIN: CPT

## 2019-07-09 ENCOUNTER — TELEPHONE (OUTPATIENT)
Dept: ENDOCRINOLOGY | Facility: MEDICAL CENTER | Age: 49
End: 2019-07-09

## 2019-07-09 DIAGNOSIS — E22.1 HYPERPROLACTINEMIA (HCC): ICD-10-CM

## 2019-07-09 NOTE — TELEPHONE ENCOUNTER
1. Caller Name: Krysten                                             Call Back Number: [phone] (home)         Patient approves a detailed voicemail message: N\A    2. SPECIFIC Action To Be Taken: Please place standing Prolactin lab order in her chart for her appt with you on 7/15/19     3. Diagnosis/Clinical Reason for Request: D35.2       4. Specialty & Provider Name/Lab/Imaging Location: [facility]    5. Is appointment scheduled for requested order/referral: no    Patient was not informed they will receive a return phone call from the office ONLY if there are any questions before processing their request. Advised to call back if they haven't received a call from the referral department in 5 days.

## 2019-07-10 ENCOUNTER — HOSPITAL ENCOUNTER (OUTPATIENT)
Dept: LAB | Facility: MEDICAL CENTER | Age: 49
End: 2019-07-10
Attending: INTERNAL MEDICINE
Payer: COMMERCIAL

## 2019-07-10 DIAGNOSIS — E22.1 HYPERPROLACTINEMIA (HCC): ICD-10-CM

## 2019-07-10 LAB — PROLACTIN SERPL-MCNC: 39.81 NG/ML (ref 2.8–26)

## 2019-07-10 PROCEDURE — 84146 ASSAY OF PROLACTIN: CPT

## 2019-07-10 PROCEDURE — 36415 COLL VENOUS BLD VENIPUNCTURE: CPT

## 2019-07-15 ENCOUNTER — OFFICE VISIT (OUTPATIENT)
Dept: ENDOCRINOLOGY | Facility: MEDICAL CENTER | Age: 49
End: 2019-07-15
Payer: COMMERCIAL

## 2019-07-15 VITALS
BODY MASS INDEX: 28.78 KG/M2 | HEIGHT: 62 IN | DIASTOLIC BLOOD PRESSURE: 60 MMHG | SYSTOLIC BLOOD PRESSURE: 116 MMHG | WEIGHT: 156.4 LBS | HEART RATE: 77 BPM | OXYGEN SATURATION: 98 %

## 2019-07-15 DIAGNOSIS — E22.1 HYPERPROLACTINEMIA (HCC): ICD-10-CM

## 2019-07-15 DIAGNOSIS — F33.3 SEVERE EPISODE OF RECURRENT MAJOR DEPRESSIVE DISORDER, WITH PSYCHOTIC FEATURES (HCC): ICD-10-CM

## 2019-07-15 DIAGNOSIS — D35.2 PITUITARY ADENOMA (HCC): ICD-10-CM

## 2019-07-15 PROCEDURE — 99213 OFFICE O/P EST LOW 20 MIN: CPT | Performed by: INTERNAL MEDICINE

## 2019-07-15 RX ORDER — CABERGOLINE 0.5 MG/1
TABLET ORAL
COMMUNITY
End: 2021-04-09

## 2019-07-15 RX ORDER — CITALOPRAM 20 MG/1
TABLET ORAL
COMMUNITY
End: 2021-04-09

## 2019-07-15 RX ORDER — TRAZODONE HYDROCHLORIDE 100 MG/1
TABLET ORAL
COMMUNITY

## 2019-07-15 RX ORDER — RISPERIDONE 2 MG/1
TABLET, ORALLY DISINTEGRATING ORAL
COMMUNITY
End: 2021-04-09

## 2019-07-16 NOTE — PROGRESS NOTES
Chief Complaint   Patient presents with   • Pituitary Adenoma   • Hyperprolactinemia   • Depression        HPI:    See assessment and recommendations below    ROS:  Patient was in with family interpreting.  I think she is doing very well      Allergies: No Known Allergies    Current medicines including changes today:  Current Outpatient Prescriptions   Medication Sig Dispense Refill   • cabergoline (DOSTINEX) 0.5 MG tablet Take 1 tablet (0.5 mg) 3 evenings per week ( Mon,Wed,Sat) 12 Tab 5   • risperidone (RISPERDAL) 2 MG Tab Take 1 Tab by mouth every bedtime. 30 Tab 2   • trazodone (DESYREL) 100 MG Tab Take 1 Tab by mouth every day. Lizeth jon antes de dormirse. 30 Tab 2   • citalopram (CELEXA) 20 MG Tab Take 1 Tab by mouth every day. TOME JON TABLETA POR VIA ORAL TODOS LOS CORONADO 30 Tab 2   • citalopram (CELEXA) 20 MG Tab citalopram 20 mg tablet   Take 1 tablet every day by oral route.     • cabergoline (DOSTINEX) 0.5 MG tablet Dostinex 0.5 mg tablet   Take 1 tablet every week by oral route.     • risperdone (RISPERDAL M-TABS) 2 MG disintegrating tablet risperidone 2 mg disintegrating tablet   Place 1 tablet twice a day by translingual route.     • traZODone (DESYREL) 100 MG Tab trazodone 100 mg tablet   Take 1 tablet twice a day by oral route.     • fluconazole (DIFLUCAN) 150 MG tablet Take 1 Tab by mouth every 48 hours. (Patient not taking: Reported on 1/21/2019) 3 Tab 0   • mometasone (NASONEX) 50 MCG/ACT nasal spray Spray 2 Sprays in nose every day. (Patient not taking: Reported on 7/15/2019) 1 Inhaler 11     No current facility-administered medications for this visit.         Past Medical History:   Diagnosis Date   • Depression    • Hyperprolactinemia (HCC)     probably secondary to medication   • Pituitary lesion (HCC)     probably nonfunctional cyst / pituitary function intact   • Schizophrenia (HCC)        PHYSICAL EXAM:    /60 (BP Location: Left arm, Patient Position: Sitting, BP Cuff Size: Adult)    "Pulse 77   Ht 1.575 m (5' 2.01\")   Wt 70.9 kg (156 lb 6.4 oz)   SpO2 98%   BMI 28.60 kg/m²     Gen.   appears healthy     Skin   appropriate for sex and age    HEENT    visual fields are full to confrontation    Neck   thyroid gland is small and difficult to palpate    Heart  regular    Extremities  no edema    Neuro  gait and station normal    Psych  appropriate    ASSESSMENT AND RECOMMENDATIONS    1. Pituitary adenoma (HCC)              10 mm lesion with hemorrhage that is stable on ultrasound March 2018.  Probably a pituitary adenoma with hemorrhage but that is not definite.  Could be a cyst with hemorrhage.              She continues to have regular menstrual cycles.  She does not have headache.  No peripheral vision loss.  Ophthalmology appointment scheduled for next week.  No galactorrhea    2. Hyperprolactinemia (HCC)               Treated effectively with cabergoline 0.5 mg 3 days a week.  It is unclear whether this relates to the pituitary lesion or Risperdal.  We are not going to discontinue Risperdal.  It is important to her.               Her prolactin level was 17 in May and currently 39.  She said she ran out of cabergoline for about 5 days when the level was 39.  She is now back on it regularly.  I will repeat her prolactin level in 2 months to make sure it is back down to normal.  - PROLACTIN; Future    3. Severe episode of recurrent major depressive disorder, with psychotic features (HCC)                     Good spirits well compensated      DISPOSITION: Follow-up lab by my chart or telephone                            If stable return in 4 months      Elroy Veras M.D.    Copies to: Lyn Wilson A.P.R.N. [phone]  "

## 2019-08-05 DIAGNOSIS — Z13.220 SCREENING, LIPID: ICD-10-CM

## 2019-08-22 DIAGNOSIS — E22.1 HYPERPROLACTINEMIA (HCC): ICD-10-CM

## 2019-08-26 RX ORDER — CABERGOLINE 0.5 MG/1
TABLET ORAL
Qty: 16 TAB | Refills: 4 | Status: SHIPPED | OUTPATIENT
Start: 2019-08-26 | End: 2019-11-11

## 2019-09-18 ENCOUNTER — HOSPITAL ENCOUNTER (OUTPATIENT)
Dept: LAB | Facility: MEDICAL CENTER | Age: 49
End: 2019-09-18
Attending: INTERNAL MEDICINE
Payer: COMMERCIAL

## 2019-09-18 DIAGNOSIS — E22.1 HYPERPROLACTINEMIA (HCC): ICD-10-CM

## 2019-09-18 DIAGNOSIS — Z13.220 SCREENING, LIPID: ICD-10-CM

## 2019-09-18 LAB
CHOLEST SERPL-MCNC: 154 MG/DL (ref 100–199)
FASTING STATUS PATIENT QL REPORTED: NORMAL
HDLC SERPL-MCNC: 30 MG/DL
LDLC SERPL CALC-MCNC: 87 MG/DL
PROLACTIN SERPL-MCNC: 14.58 NG/ML (ref 2.8–26)
TRIGL SERPL-MCNC: 183 MG/DL (ref 0–149)

## 2019-09-18 PROCEDURE — 84146 ASSAY OF PROLACTIN: CPT

## 2019-09-18 PROCEDURE — 80061 LIPID PANEL: CPT

## 2019-09-18 PROCEDURE — 36415 COLL VENOUS BLD VENIPUNCTURE: CPT

## 2019-11-05 ENCOUNTER — TELEPHONE (OUTPATIENT)
Dept: ENDOCRINOLOGY | Facility: MEDICAL CENTER | Age: 49
End: 2019-11-05

## 2019-11-05 DIAGNOSIS — E22.1 HYPERPROLACTINEMIA (HCC): ICD-10-CM

## 2019-11-05 DIAGNOSIS — D35.2 PITUITARY ADENOMA (HCC): ICD-10-CM

## 2019-11-06 ENCOUNTER — HOSPITAL ENCOUNTER (OUTPATIENT)
Dept: LAB | Facility: MEDICAL CENTER | Age: 49
End: 2019-11-06
Attending: INTERNAL MEDICINE
Payer: COMMERCIAL

## 2019-11-06 DIAGNOSIS — E22.1 HYPERPROLACTINEMIA (HCC): ICD-10-CM

## 2019-11-06 DIAGNOSIS — D35.2 PITUITARY ADENOMA (HCC): ICD-10-CM

## 2019-11-06 LAB
ANION GAP SERPL CALC-SCNC: 15 MMOL/L (ref 0–11.9)
BUN SERPL-MCNC: 9 MG/DL (ref 8–22)
CALCIUM SERPL-MCNC: 9.3 MG/DL (ref 8.5–10.5)
CHLORIDE SERPL-SCNC: 108 MMOL/L (ref 96–112)
CO2 SERPL-SCNC: 17 MMOL/L (ref 20–33)
CREAT SERPL-MCNC: 0.61 MG/DL (ref 0.5–1.4)
GLUCOSE SERPL-MCNC: 79 MG/DL (ref 65–99)
POTASSIUM SERPL-SCNC: 4.1 MMOL/L (ref 3.6–5.5)
PROLACTIN SERPL-MCNC: 11.01 NG/ML (ref 2.8–26)
SODIUM SERPL-SCNC: 140 MMOL/L (ref 135–145)
T4 FREE SERPL-MCNC: 0.84 NG/DL (ref 0.53–1.43)
TSH SERPL DL<=0.005 MIU/L-ACNC: 2.52 UIU/ML (ref 0.38–5.33)

## 2019-11-06 PROCEDURE — 36415 COLL VENOUS BLD VENIPUNCTURE: CPT

## 2019-11-06 PROCEDURE — 84439 ASSAY OF FREE THYROXINE: CPT

## 2019-11-06 PROCEDURE — 84443 ASSAY THYROID STIM HORMONE: CPT

## 2019-11-06 PROCEDURE — 84146 ASSAY OF PROLACTIN: CPT

## 2019-11-06 PROCEDURE — 84305 ASSAY OF SOMATOMEDIN: CPT

## 2019-11-06 PROCEDURE — 80048 BASIC METABOLIC PNL TOTAL CA: CPT

## 2019-11-06 NOTE — TELEPHONE ENCOUNTER
Phone Number Called: [phone] (home)       Call outcome: left message for patient to call back regarding message below    Message: Spoke through an  to remind her about apt on Nov 11th and to get labs done before

## 2019-11-07 LAB
IGF-I SERPL-MCNC: 108 NG/ML (ref 60–240)
IGF-I Z-SCORE SERPL: -0.5

## 2019-11-11 ENCOUNTER — OFFICE VISIT (OUTPATIENT)
Dept: ENDOCRINOLOGY | Facility: MEDICAL CENTER | Age: 49
End: 2019-11-11
Payer: COMMERCIAL

## 2019-11-11 VITALS
OXYGEN SATURATION: 97 % | DIASTOLIC BLOOD PRESSURE: 54 MMHG | BODY MASS INDEX: 28.71 KG/M2 | HEART RATE: 86 BPM | HEIGHT: 62 IN | SYSTOLIC BLOOD PRESSURE: 96 MMHG | WEIGHT: 156 LBS

## 2019-11-11 DIAGNOSIS — E22.1 HYPERPROLACTINEMIA (HCC): ICD-10-CM

## 2019-11-11 DIAGNOSIS — D35.2 PITUITARY ADENOMA (HCC): ICD-10-CM

## 2019-11-11 PROCEDURE — 99213 OFFICE O/P EST LOW 20 MIN: CPT | Performed by: INTERNAL MEDICINE

## 2019-11-11 RX ORDER — CABERGOLINE 0.5 MG/1
0.5 TABLET ORAL
Qty: 12 TAB | Refills: 5 | Status: SHIPPED | OUTPATIENT
Start: 2019-11-11 | End: 2021-11-24

## 2019-11-12 ENCOUNTER — TELEPHONE (OUTPATIENT)
Dept: ENDOCRINOLOGY | Facility: MEDICAL CENTER | Age: 49
End: 2019-11-12

## 2019-11-12 DIAGNOSIS — D35.2 PITUITARY ADENOMA (HCC): ICD-10-CM

## 2019-11-12 RX ORDER — CABERGOLINE 0.5 MG/1
0.5 TABLET ORAL
Qty: 12 TAB | Refills: 5 | Status: SHIPPED | OUTPATIENT
Start: 2019-11-13 | End: 2020-05-13

## 2019-11-12 NOTE — PROGRESS NOTES
Chief Complaint   Patient presents with   • Pituitary Adenoma     Hemorrhagic cyst /adenoma   • Hyperprolactinemia        HPI:       1. Hyperprolactinemia/pituitary cystic adenoma.    The issue all along has been whether or not she has a prolactinoma or a small incidental pituitary lesion and the hyperprolactinemia is due to her antidepressant, Risperdal.  This is a very important medication to her so we are not going to discontinue Risperdal.  In the beginning I felt this was a cystic lesion unrelated to the hyperprolactinemia.  Her level has come down progressively.  The highest level I have is 105 back in 2016.  It was 65 in January.  I think we reduced her cabergoline to .5 mg three times a week and her prolactin level was 31.9.  No galactorrhea .  I neglected to quiz her about her menses.  She is not having any headache, no visual disturbance and seems quite comfortable.  She obviously is not depressed and in good spirits so I think we will continue the present track.  No change in the cabergoline dose.  I would like to see her in four months but get a prolactin level in two months.  I hope she understood that.  Her thyroid levels are good with TSH 1.9 and her general chemistries are all excellent.  So I think we are on the right track, we are just not sure what we are treating with the cabergoline.      ROS:  All other systems reported as negative or unchanged since last exam      Allergies: No Known Allergies    Current medicines including changes today:  Current Outpatient Medications   Medication Sig Dispense Refill   • cabergoline (DOSTINEX) 0.5 MG tablet Take 1 Tab by mouth every Monday, Wednesday, and Friday. 12 Tab 5   • risperdone (RISPERDAL M-TABS) 2 MG disintegrating tablet risperidone 2 mg disintegrating tablet   Place 1 tablet twice a day by translingual route.     • mometasone (NASONEX) 50 MCG/ACT nasal spray Spray 2 Sprays in nose every day. 1 Inhaler 11   • trazodone (DESYREL) 100 MG Tab Take 1  "Tab by mouth every day. Lizeth jon antes de dormirse. 30 Tab 2   • citalopram (CELEXA) 20 MG Tab Take 1 Tab by mouth every day. TOME JON TABLETA POR VIA ORAL TODOS LOS CORONADO 30 Tab 2   • citalopram (CELEXA) 20 MG Tab citalopram 20 mg tablet   Take 1 tablet every day by oral route.     • cabergoline (DOSTINEX) 0.5 MG tablet Dostinex 0.5 mg tablet   Take 1 tablet every week by oral route.     • traZODone (DESYREL) 100 MG Tab trazodone 100 mg tablet   Take 1 tablet twice a day by oral route.     • fluconazole (DIFLUCAN) 150 MG tablet Take 1 Tab by mouth every 48 hours. (Patient not taking: Reported on 1/21/2019) 3 Tab 0   • risperidone (RISPERDAL) 2 MG Tab Take 1 Tab by mouth every bedtime. 30 Tab 2     No current facility-administered medications for this visit.         Past Medical History:   Diagnosis Date   • Depression    • Hyperprolactinemia (HCC)     probably secondary to medication   • Pituitary lesion (HCC)     probably nonfunctional cyst / pituitary function intact   • Schizophrenia (HCC)        PHYSICAL EXAM:    BP (!) 96/54 (BP Location: Left arm, Patient Position: Sitting, BP Cuff Size: Adult)   Pulse 86   Ht 1.575 m (5' 2.01\")   Wt 70.8 kg (156 lb)   SpO2 97%   BMI 28.53 kg/m²     Gen.   appears healthy     Skin   appropriate for sex and age    HEENT   visual fields are full to gross confrontation    Neck   thyroid gland is relatively small and difficult to palpate or substernal    Heart  regular    Extremities  no edema    Neuro  gait and station normal    Psych  appropriate, good spirits    ASSESSMENT AND RECOMMENDATIONS    1. Hyperprolactinemia (HCC)             Either related to a pituitary cystic adenoma or Risperdal  - PROLACTIN; Future  - IGF-1 SOMATOMEDIN; Future  - FREE THYROXINE; Future  - cabergoline (DOSTINEX) 0.5 MG tablet; Take 1 Tab by mouth every Monday, Wednesday, and Friday.  Dispense: 12 Tab; Refill: 5    2. Pituitary adenoma (HCC)             No headache or peripheral field visual " defect             Pituitary function otherwise intact      DISPOSITION: No changes                            Return in 3 months      Elroy Veras M.D.    Copies to: MAR GuyP.R.N. [phone]

## 2020-01-03 ENCOUNTER — HOSPITAL ENCOUNTER (OUTPATIENT)
Dept: RADIOLOGY | Facility: MEDICAL CENTER | Age: 50
End: 2020-01-03
Attending: NURSE PRACTITIONER
Payer: COMMERCIAL

## 2020-01-03 DIAGNOSIS — Z12.31 VISIT FOR SCREENING MAMMOGRAM: ICD-10-CM

## 2020-01-03 PROCEDURE — 77067 SCR MAMMO BI INCL CAD: CPT

## 2020-01-08 ENCOUNTER — GYNECOLOGY VISIT (OUTPATIENT)
Dept: OBGYN | Facility: CLINIC | Age: 50
End: 2020-01-08
Payer: COMMERCIAL

## 2020-01-08 ENCOUNTER — HOSPITAL ENCOUNTER (OUTPATIENT)
Dept: LAB | Facility: MEDICAL CENTER | Age: 50
End: 2020-01-08
Attending: INTERNAL MEDICINE
Payer: COMMERCIAL

## 2020-01-08 VITALS
HEART RATE: 82 BPM | DIASTOLIC BLOOD PRESSURE: 68 MMHG | BODY MASS INDEX: 30.72 KG/M2 | WEIGHT: 168 LBS | SYSTOLIC BLOOD PRESSURE: 101 MMHG

## 2020-01-08 DIAGNOSIS — Z01.419 WELL WOMAN EXAM: ICD-10-CM

## 2020-01-08 DIAGNOSIS — E22.1 HYPERPROLACTINEMIA (HCC): ICD-10-CM

## 2020-01-08 LAB
PROLACTIN SERPL-MCNC: 20.24 NG/ML (ref 2.8–26)
T4 FREE SERPL-MCNC: 0.91 NG/DL (ref 0.53–1.43)

## 2020-01-08 PROCEDURE — 84439 ASSAY OF FREE THYROXINE: CPT

## 2020-01-08 PROCEDURE — 36415 COLL VENOUS BLD VENIPUNCTURE: CPT

## 2020-01-08 PROCEDURE — 99396 PREV VISIT EST AGE 40-64: CPT | Performed by: OBSTETRICS & GYNECOLOGY

## 2020-01-08 PROCEDURE — 84146 ASSAY OF PROLACTIN: CPT

## 2020-01-08 PROCEDURE — 84305 ASSAY OF SOMATOMEDIN: CPT

## 2020-01-09 NOTE — PROGRESS NOTES
Amy Ugalde49 y.o.  female presents for Annual Well Woman Exam.    ROS: Patient is feeling well. No dyspnea or chest pain on exertion. No Abdominal pain, change in bowel habits, black or bloody stools. No urinary sx. GYN ROS:normal menses, no abnormal bleeding, pelvic pain or discharge, no breast pain or new or enlarging lumps on self exam. Denies breast tenderness, mass, discharge, changes in size or contour, or abnormal cyclic discomfort. No neurological complaints.  Past Medical History:   Diagnosis Date   • Depression    • Hyperprolactinemia (HCC)     probably secondary to medication   • Pituitary lesion (HCC)     probably nonfunctional cyst / pituitary function intact   • Schizophrenia (HCC)        Allergy:   Patient has no known allergies.    LMP:       Patient's last menstrual period was 2019. .     Menstrual History: menses regular every 28 days  Contraceptive Method:none      Objective : The patient appears well, alert and oriented x 3, in no acute distress.  /68   Pulse 82   Wt 76.2 kg (168 lb)   HEENT Exam: EOMI, PRISCA, no adenopathy or thyromegaly.  Lungs: Clear to Auscultation and Percussion.  Cor: S1 and S2 normal, no murmurs, or rubs   Abdomen: Soft without tenderness, guarding mass or organomegaly.  Extremities: No edema, pulses equal  Neurological: Normal No focal signs  Breast Exam:Inspection negative. No nipple discharge or bleeding. No masses or nodularity palpable, negative findings: normal in size and symmetry, normal contour with no evidence of flattening or dimpling, skin normal, nipples everted without rashes or discharge  Pelvic: External genitalia,urethral meatus, urethra, bladder and vagina normal. Cervix, uterus and adnexa intact and normal.  Anus and perineum normal. Bimanual and rectovaginal without masses or tenderness.    Lab:No results found for this or any previous visit (from the past 336 hour(s)).    Assessment:  well woman    Plan:mammogram   Done   pap smear  return annually or prn  Self Breast Exams  Contraception:none

## 2020-01-10 LAB
IGF-I SERPL-MCNC: 124 NG/ML (ref 60–240)
IGF-I Z-SCORE SERPL: -0.1

## 2020-01-13 ENCOUNTER — OFFICE VISIT (OUTPATIENT)
Dept: ENDOCRINOLOGY | Facility: MEDICAL CENTER | Age: 50
End: 2020-01-13
Payer: COMMERCIAL

## 2020-01-13 VITALS
HEIGHT: 62 IN | DIASTOLIC BLOOD PRESSURE: 54 MMHG | BODY MASS INDEX: 29.52 KG/M2 | WEIGHT: 160.4 LBS | SYSTOLIC BLOOD PRESSURE: 104 MMHG | HEART RATE: 92 BPM | OXYGEN SATURATION: 96 %

## 2020-01-13 DIAGNOSIS — E22.1 HYPERPROLACTINEMIA (HCC): ICD-10-CM

## 2020-01-13 DIAGNOSIS — D35.2 PITUITARY ADENOMA (HCC): ICD-10-CM

## 2020-01-13 PROCEDURE — 99213 OFFICE O/P EST LOW 20 MIN: CPT | Performed by: INTERNAL MEDICINE

## 2020-01-13 RX ORDER — CABERGOLINE 0.5 MG/1
0.5 TABLET ORAL
Qty: 12 TAB | Refills: 5 | Status: SHIPPED | OUTPATIENT
Start: 2020-01-13 | End: 2020-02-12

## 2020-01-14 NOTE — PROGRESS NOTES
Chief Complaint   Patient presents with   • Pituitary Adenoma     Presumptive prolactinoma   • Hyperprolactinemia        HPI:    Hyperprolactinemia / ?  Prolactinoma          I have followed this patient since 2016 when she was discovered to have a hemorrhagic cystic 10 mm lesion in the pituitary and a prolactin level of 105.  She did have galactorrhea but she had no fertility problem up to that point.  She did have some emotional problems and was on Risperdal which seemed to be very helpful to her and we were all reluctant to discontinue its use.            Since that time she has taken cabergoline and her prolactin level has normalized.  She currently is taking 0.5 mg 3 days a week and her prolactin is 20.  She does not have galactorrhea spontaneously but sometimes with compression of the breast she has a small amount.  She does not have headache.  She currently is having regular menstrual cycles.              She has a peripheral field vision checked every year and it remains intact.  She tolerates cabergoline well.  Her last pituitary MRI was 2018 and it remained exactly the same as the original in 2016.            We have mutually agreed to leave as is    ROS:  All other systems reported as negative or unchanged since last exam      Allergies: No Known Allergies    Current medicines including changes today:  Current Outpatient Medications   Medication Sig Dispense Refill   • cabergoline (DOSTINEX) 0.5 MG tablet Take 1 Tab by mouth every Monday, Wednesday, and Friday. 12 Tab 5   • risperdone (RISPERDAL M-TABS) 2 MG disintegrating tablet risperidone 2 mg disintegrating tablet   Place 1 tablet twice a day by translingual route.     • trazodone (DESYREL) 100 MG Tab Take 1 Tab by mouth every day. Lizeth isreal antes de dormirse. 30 Tab 2   • cabergoline (DOSTINEX) 0.5 MG tablet Take 1 Tab by mouth every Monday, Wednesday, and Friday. (Patient not taking: Reported on 1/13/2020) 12 Tab 5   • citalopram (CELEXA) 20 MG Tab  "citalopram 20 mg tablet   Take 1 tablet every day by oral route.     • cabergoline (DOSTINEX) 0.5 MG tablet Dostinex 0.5 mg tablet   Take 1 tablet every week by oral route.     • traZODone (DESYREL) 100 MG Tab trazodone 100 mg tablet   Take 1 tablet twice a day by oral route.     • fluconazole (DIFLUCAN) 150 MG tablet Take 1 Tab by mouth every 48 hours. (Patient not taking: Reported on 1/21/2019) 3 Tab 0   • mometasone (NASONEX) 50 MCG/ACT nasal spray Spray 2 Sprays in nose every day. (Patient not taking: Reported on 1/13/2020) 1 Inhaler 11   • risperidone (RISPERDAL) 2 MG Tab Take 1 Tab by mouth every bedtime. (Patient not taking: Reported on 1/8/2020) 30 Tab 2   • citalopram (CELEXA) 20 MG Tab Take 1 Tab by mouth every day. TOME JON TABLETA POR VIA ORAL TODOS LOS CORONADO (Patient not taking: Reported on 1/8/2020) 30 Tab 2     No current facility-administered medications for this visit.         Past Medical History:   Diagnosis Date   • Depression    • Hyperprolactinemia (HCC)     probably secondary to medication   • Pituitary lesion (HCC)     probably nonfunctional cyst / pituitary function intact   • Schizophrenia (HCC)        PHYSICAL EXAM:    /54 (BP Location: Left arm, Patient Position: Sitting, BP Cuff Size: Adult)   Pulse 92   Ht 1.575 m (5' 2.01\")   Wt 72.8 kg (160 lb 6.4 oz)   LMP 12/14/2019   SpO2 96%   BMI 29.33 kg/m²     Gen. mildly overweight but otherwise appears healthy.  No cushingoid features    Skin   appropriate for sex and age    HEENT   visual fields are full to gross confrontation    Neck   thyroid gland is relatively small and difficult to palpate    Heart  regular    Extremities  no edema    Neuro  gait and station normal    Psych  appropriate      ASSESSMENT AND RECOMMENDATIONS    1. Pituitary adenoma (HCC) 2016           ?  Prolactinoma versus hemorrhagic cyst            See HPI                   2. Hyperprolactinemia (HCC)            Continue cabergoline 0.5 mg 3 days a week  - " PROLACTIN; Future  - FREE THYROXINE; Future  - TSH; Future      DISPOSITION: Return in 4 months and review      Elroy Veras M.D.    Copies to: MAR GuyP.R.N. [phone]

## 2020-03-02 ENCOUNTER — HOSPITAL ENCOUNTER (OUTPATIENT)
Dept: RADIOLOGY | Facility: MEDICAL CENTER | Age: 50
End: 2020-03-02
Attending: NEUROLOGICAL SURGERY
Payer: COMMERCIAL

## 2020-03-02 DIAGNOSIS — D35.2 BENIGN NEOPLASM OF PITUITARY GLAND AND CRANIOPHARYNGEAL DUCT (POUCH) (HCC): ICD-10-CM

## 2020-03-02 DIAGNOSIS — D35.3 BENIGN NEOPLASM OF PITUITARY GLAND AND CRANIOPHARYNGEAL DUCT (POUCH) (HCC): ICD-10-CM

## 2020-03-02 PROCEDURE — 700117 HCHG RX CONTRAST REV CODE 255: Performed by: NEUROLOGICAL SURGERY

## 2020-03-02 PROCEDURE — 70553 MRI BRAIN STEM W/O & W/DYE: CPT

## 2020-03-02 PROCEDURE — A9576 INJ PROHANCE MULTIPACK: HCPCS | Performed by: NEUROLOGICAL SURGERY

## 2020-03-02 RX ADMIN — GADOTERIDOL 15 ML: 279.3 INJECTION, SOLUTION INTRAVENOUS at 16:30

## 2020-05-06 ENCOUNTER — HOSPITAL ENCOUNTER (OUTPATIENT)
Dept: LAB | Facility: MEDICAL CENTER | Age: 50
End: 2020-05-06
Attending: INTERNAL MEDICINE
Payer: COMMERCIAL

## 2020-05-06 DIAGNOSIS — E22.1 HYPERPROLACTINEMIA (HCC): ICD-10-CM

## 2020-05-06 LAB
PROLACTIN SERPL-MCNC: 19.7 NG/ML (ref 2.8–26)
T4 FREE SERPL-MCNC: 1.11 NG/DL (ref 0.93–1.7)
TSH SERPL DL<=0.005 MIU/L-ACNC: 2.27 UIU/ML (ref 0.38–5.33)

## 2020-05-06 PROCEDURE — 84443 ASSAY THYROID STIM HORMONE: CPT

## 2020-05-06 PROCEDURE — 36415 COLL VENOUS BLD VENIPUNCTURE: CPT

## 2020-05-06 PROCEDURE — 84146 ASSAY OF PROLACTIN: CPT

## 2020-05-06 PROCEDURE — 84439 ASSAY OF FREE THYROXINE: CPT

## 2020-05-11 ENCOUNTER — TELEMEDICINE (OUTPATIENT)
Dept: ENDOCRINOLOGY | Facility: MEDICAL CENTER | Age: 50
End: 2020-05-11
Payer: COMMERCIAL

## 2020-05-11 DIAGNOSIS — F29 PSYCHOSIS, UNSPECIFIED PSYCHOSIS TYPE (HCC): ICD-10-CM

## 2020-05-11 DIAGNOSIS — E22.1 HYPERPROLACTINEMIA (HCC): ICD-10-CM

## 2020-05-11 DIAGNOSIS — D35.2 PITUITARY ADENOMA (HCC): ICD-10-CM

## 2020-05-11 NOTE — PROGRESS NOTES
Telemedicine Visit: Established Patient     This encounter was conducted via Zoom .   Verbal consent was obtained. Patient's identity was verified.    Subjective:   CC:   Amy Ugalde is a 50 y.o. female presenting for evaluation and management of pituitary adenoma and hyperprolactinemia    HPI    Feeling very well. No headaches or visual change. No galactorrhea. Regular menses.       Recent pituitary MR indicates tumor is smaller. No optic nerve impingement.       Polactin = 19    Mental health is good. ( no family to verify     ROS   Denies any recent fevers or chills. No nausea or vomiting. No chest pains or shortness of breath.     No Known Allergies    Current medicines (including changes today)  Current Outpatient Medications   Medication Sig Dispense Refill   • cabergoline (DOSTINEX) 0.5 MG tablet Take 1 Tab by mouth every Monday, Wednesday, and Friday. 12 Tab 5   • cabergoline (DOSTINEX) 0.5 MG tablet Take 1 Tab by mouth every Monday, Wednesday, and Friday. (Patient not taking: Reported on 1/13/2020) 12 Tab 5   • citalopram (CELEXA) 20 MG Tab citalopram 20 mg tablet   Take 1 tablet every day by oral route.     • cabergoline (DOSTINEX) 0.5 MG tablet Dostinex 0.5 mg tablet   Take 1 tablet every week by oral route.     • risperdone (RISPERDAL M-TABS) 2 MG disintegrating tablet risperidone 2 mg disintegrating tablet   Place 1 tablet twice a day by translingual route.     • traZODone (DESYREL) 100 MG Tab trazodone 100 mg tablet   Take 1 tablet twice a day by oral route.     • fluconazole (DIFLUCAN) 150 MG tablet Take 1 Tab by mouth every 48 hours. (Patient not taking: Reported on 1/21/2019) 3 Tab 0   • mometasone (NASONEX) 50 MCG/ACT nasal spray Spray 2 Sprays in nose every day. (Patient not taking: Reported on 1/13/2020) 1 Inhaler 11   • risperidone (RISPERDAL) 2 MG Tab Take 1 Tab by mouth every bedtime. (Patient not taking: Reported on 1/8/2020) 30 Tab 2   • trazodone (DESYREL) 100 MG Tab Take 1 Tab by  mouth every day. Lizeth jon antes de dormirse. 30 Tab 2   • citalopram (CELEXA) 20 MG Tab Take 1 Tab by mouth every day. TOME JON TABLETA POR VIA ORAL TODOS LOS CORONADO (Patient not taking: Reported on 1/8/2020) 30 Tab 2     No current facility-administered medications for this visit.        Patient Active Problem List    Diagnosis Date Noted   • Pituitary adenoma (HCC) 01/21/2019   • Pituitary lesion (HCC) 05/15/2018   • Hyperprolactinemia (HCC) 05/30/2017   • Amenorrhea 01/06/2016   • Bilateral nipple discharge 01/06/2016   • Other schizophrenia ([facility]) 12/15/2015   • Insomnia 10/30/2013   • Major depression 10/03/2013   • Psychosis (HCC) 10/03/2013       Family History   Problem Relation Age of Onset   • Lung Disease Mother         emphysema   • Kidney stones Mother        She  has a past medical history of Depression, Hyperprolactinemia (HCC), Pituitary lesion (HCC), and Schizophrenia (HCC). She also has no past medical history of Arrhythmia, Asthma, Blood transfusion without reported diagnosis, Cancer (HCC), Cataract, CHF (congestive heart failure) (HCC), Clotting disorder (HCC), COPD (chronic obstructive pulmonary disease) (HCC), Diabetes (HCC), Diabetic neuropathy (HCC), GERD (gastroesophageal reflux disease), Glaucoma, Heart attack (HCC), Heart murmur, Hyperlipidemia, Hypertension, Kidney disease, Migraine, Pulmonary emphysema (HCC), Seizure (HCC), Stroke (HCC), Substance abuse (HCC), or Thyroid disease.  She  has a past surgical history that includes gyn surgery; repeat c section w tubal ligation (6/20/2013); primary c section; and pelviscopy.       Objective:   Vitals obtained by patient:   not reported             Physical Exam: by observation  Constitutional: Alert, no distress, well-groomed.  Skin: No rashes in visible areas.reported icterus.   ENMT: Lips pink without lesions, good dentition, moist mucous membranes. Phonation normal.  Neck: No masses, no thyromegaly. Moves freely without pain.  CV: Pulse as  reported by patient  Respiratory: Unlabored respiratory effort, no cough or audible wheeze  Psych: Alert and oriented x3, normal affect and mood.       Assessment and Plan:   The following treatment plan was discussed:     1. Pituitary adenoma (HCC)             No growth while taking cabergoline .5 mg three days per week. Continue same Rx          Annual vision exams    2. Hyperprolactinemia (HCC)            Unclear if this is a prolactinoma but it probably is    3. Psychosis, unspecified psychosis type (HCC)           Seems to be responding very well to the psych meds which includes risperdal  which can  elevate prolactin. No changes indicated.

## 2020-05-13 DIAGNOSIS — D35.2 PITUITARY ADENOMA (HCC): ICD-10-CM

## 2020-05-13 RX ORDER — CABERGOLINE 0.5 MG/1
0.5 TABLET ORAL
Qty: 36 TAB | Refills: 1 | Status: SHIPPED | OUTPATIENT
Start: 2020-05-13 | End: 2020-10-27

## 2020-05-13 NOTE — TELEPHONE ENCOUNTER
Received request via: Pharmacy    Was the patient seen in the last year in this department? Yes    Does the patient have an active prescription (recently filled or refills available) for medication(s) requested? No     cabergoline (DOSTINEX) 0.5 MG tablet         Sig: Take 1 Tab by mouth every Monday, Wednesday, and Friday.

## 2020-08-04 ENCOUNTER — TELEPHONE (OUTPATIENT)
Dept: ENDOCRINOLOGY | Facility: MEDICAL CENTER | Age: 50
End: 2020-08-04

## 2020-08-04 NOTE — TELEPHONE ENCOUNTER
Patient is requesting lab orders for f/v with Dr. Veras next week. Patient states that  has requested tomorrow off work so she can go to the lab and get f/v labs drawn.     [phone]

## 2020-08-07 ENCOUNTER — HOSPITAL ENCOUNTER (OUTPATIENT)
Dept: LAB | Facility: MEDICAL CENTER | Age: 50
End: 2020-08-07
Attending: INTERNAL MEDICINE
Payer: COMMERCIAL

## 2020-08-07 DIAGNOSIS — D35.2 PITUITARY ADENOMA (HCC): ICD-10-CM

## 2020-08-07 DIAGNOSIS — E22.1 HYPERPROLACTINEMIA (HCC): ICD-10-CM

## 2020-08-07 LAB
ANION GAP SERPL CALC-SCNC: 13 MMOL/L (ref 7–16)
BUN SERPL-MCNC: 11 MG/DL (ref 8–22)
CALCIUM SERPL-MCNC: 9.5 MG/DL (ref 8.5–10.5)
CHLORIDE SERPL-SCNC: 102 MMOL/L (ref 96–112)
CO2 SERPL-SCNC: 22 MMOL/L (ref 20–33)
CREAT SERPL-MCNC: 0.63 MG/DL (ref 0.5–1.4)
GLUCOSE SERPL-MCNC: 95 MG/DL (ref 65–99)
POTASSIUM SERPL-SCNC: 4 MMOL/L (ref 3.6–5.5)
PROLACTIN SERPL-MCNC: 7.85 NG/ML (ref 2.8–26)
SODIUM SERPL-SCNC: 137 MMOL/L (ref 135–145)
T4 FREE SERPL-MCNC: 1.01 NG/DL (ref 0.93–1.7)
TSH SERPL DL<=0.005 MIU/L-ACNC: 2.33 UIU/ML (ref 0.38–5.33)

## 2020-08-07 PROCEDURE — 80048 BASIC METABOLIC PNL TOTAL CA: CPT

## 2020-08-07 PROCEDURE — 84305 ASSAY OF SOMATOMEDIN: CPT

## 2020-08-07 PROCEDURE — 84443 ASSAY THYROID STIM HORMONE: CPT

## 2020-08-07 PROCEDURE — 36415 COLL VENOUS BLD VENIPUNCTURE: CPT

## 2020-08-07 PROCEDURE — 84439 ASSAY OF FREE THYROXINE: CPT

## 2020-08-07 PROCEDURE — 84146 ASSAY OF PROLACTIN: CPT

## 2020-08-10 LAB
IGF-I SERPL-MCNC: 93 NG/ML (ref 59–238)
IGF-I Z-SCORE SERPL: -0.9

## 2020-08-11 ENCOUNTER — OFFICE VISIT (OUTPATIENT)
Dept: ENDOCRINOLOGY | Facility: MEDICAL CENTER | Age: 50
End: 2020-08-11
Attending: INTERNAL MEDICINE
Payer: COMMERCIAL

## 2020-08-11 VITALS
HEIGHT: 62 IN | DIASTOLIC BLOOD PRESSURE: 54 MMHG | HEART RATE: 70 BPM | WEIGHT: 161.6 LBS | SYSTOLIC BLOOD PRESSURE: 106 MMHG | BODY MASS INDEX: 29.74 KG/M2

## 2020-08-11 DIAGNOSIS — E22.1 HYPERPROLACTINEMIA (HCC): ICD-10-CM

## 2020-08-11 DIAGNOSIS — D35.2 PITUITARY ADENOMA (HCC): ICD-10-CM

## 2020-08-11 PROCEDURE — 99213 OFFICE O/P EST LOW 20 MIN: CPT | Performed by: INTERNAL MEDICINE

## 2020-08-11 PROCEDURE — 99211 OFF/OP EST MAY X REQ PHY/QHP: CPT | Performed by: INTERNAL MEDICINE

## 2020-08-12 NOTE — PROGRESS NOTES
Chief Complaint   Patient presents with   • Pituitary Adenoma     Prolactinoma      This office visit was interpreted with a professional telephone .  I think she understood most of what I said without this aid but we used it anyway for reassurance.    HPI:          Feeling very well.  No headache.  No visual disturbance and will have her visual fields tested formally this year.  No galactorrhea.  I think she is having menstrual cycles.        She tolerates cabergoline 0.5 mg 3 days a week.    The most recent MRI in March of this year indicated a decrease in size of the pituitary adenoma compared to 2 years previously.  Currently measures 10 x 2 mm and previously 10 x 7 mm.  The tumor does not abut the optic chiasm.    ROS:      All other systems reported as negative or unchanged since last exam      Allergies: No Known Allergies    Current medicines including changes today:  Current Outpatient Medications   Medication Sig Dispense Refill   • cabergoline (DOSTINEX) 0.5 MG tablet TAKE 1 TAB BY MOUTH EVERY MONDAY, WEDNESDAY, AND FRIDAY. 36 Tab 1   • citalopram (CELEXA) 20 MG Tab citalopram 20 mg tablet   Take 1 tablet every day by oral route.     • cabergoline (DOSTINEX) 0.5 MG tablet Dostinex 0.5 mg tablet   Take 1 tablet every week by oral route.     • risperdone (RISPERDAL M-TABS) 2 MG disintegrating tablet risperidone 2 mg disintegrating tablet   Place 1 tablet twice a day by translingual route.     • traZODone (DESYREL) 100 MG Tab trazodone 100 mg tablet   Take 1 tablet twice a day by oral route.     • trazodone (DESYREL) 100 MG Tab Take 1 Tab by mouth every day. Lizeth isreal antes de dormirse. 30 Tab 2   • cabergoline (DOSTINEX) 0.5 MG tablet Take 1 Tab by mouth every Monday, Wednesday, and Friday. (Patient not taking: Reported on 1/13/2020) 12 Tab 5   • fluconazole (DIFLUCAN) 150 MG tablet Take 1 Tab by mouth every 48 hours. (Patient not taking: Reported on 1/21/2019) 3 Tab 0   • mometasone (NASONEX) 50  "MCG/ACT nasal spray Spray 2 Sprays in nose every day. (Patient not taking: Reported on 1/13/2020) 1 Inhaler 11   • risperidone (RISPERDAL) 2 MG Tab Take 1 Tab by mouth every bedtime. (Patient not taking: Reported on 1/8/2020) 30 Tab 2   • citalopram (CELEXA) 20 MG Tab Take 1 Tab by mouth every day. TOME JON TABLETA POR VIA ORAL TODOS LOS CORONADO (Patient not taking: Reported on 1/8/2020) 30 Tab 2     No current facility-administered medications for this visit.         Past Medical History:   Diagnosis Date   • Depression    • Hyperprolactinemia (HCC)     probably secondary to medication   • Pituitary lesion (HCC)     probably nonfunctional cyst / pituitary function intact   • Schizophrenia (HCC)        PHYSICAL EXAM:    /54 (BP Location: Left arm, Patient Position: Sitting, BP Cuff Size: Adult)   Pulse 70   Ht 1.575 m (5' 2.01\")   Wt 73.3 kg (161 lb 9.6 oz)   BMI 29.55 kg/m²     Gen.   appears healthy     Skin   appropriate for sex and age    HEENT   visual fields are intact to gross confrontation.    Heart  regular    Extremities  no edema    Neuro  gait and station normal    Psych  appropriate    ASSESSMENT AND RECOMMENDATIONS    1. Pituitary adenoma (HCC)           Tumor decreasing in size with cabergoline 0.5 mg 3 days a week            Asymptomatic            Pituitary function otherwise intact.  Free T4 is normal at 1.0 and IGF-I also normal at 93.             Electrolytes and renal function are normal    2. Hyperprolactinemia (HCC)               Current prolactin 7.8 down from 19.7 in May               DISPOSITION: Return in 3 months and update lab.  Might consider to decrease preferably      Elroy Veras M.D.    Copies to: IZABELA MahanR.N. [phone]  "

## 2020-10-26 DIAGNOSIS — D35.2 PITUITARY ADENOMA (HCC): ICD-10-CM

## 2020-10-27 RX ORDER — CABERGOLINE 0.5 MG/1
0.5 TABLET ORAL
Qty: 32 TAB | Refills: 2 | Status: SHIPPED | OUTPATIENT
Start: 2020-10-28 | End: 2021-04-09

## 2020-11-12 ENCOUNTER — HOSPITAL ENCOUNTER (OUTPATIENT)
Dept: LAB | Facility: MEDICAL CENTER | Age: 50
End: 2020-11-12
Attending: INTERNAL MEDICINE
Payer: COMMERCIAL

## 2020-11-12 DIAGNOSIS — E22.1 HYPERPROLACTINEMIA (HCC): ICD-10-CM

## 2020-11-12 DIAGNOSIS — D35.2 PITUITARY ADENOMA (HCC): ICD-10-CM

## 2020-11-12 LAB
CORTIS SERPL-MCNC: 3.7 UG/DL (ref 0–23)
PROLACTIN SERPL-MCNC: 18.4 NG/ML (ref 2.8–26)
T4 FREE SERPL-MCNC: 1.16 NG/DL (ref 0.93–1.7)
TSH SERPL DL<=0.005 MIU/L-ACNC: 1.74 UIU/ML (ref 0.38–5.33)

## 2020-11-12 PROCEDURE — 82024 ASSAY OF ACTH: CPT

## 2020-11-12 PROCEDURE — 36415 COLL VENOUS BLD VENIPUNCTURE: CPT

## 2020-11-12 PROCEDURE — 82533 TOTAL CORTISOL: CPT

## 2020-11-12 PROCEDURE — 84439 ASSAY OF FREE THYROXINE: CPT

## 2020-11-12 PROCEDURE — 84443 ASSAY THYROID STIM HORMONE: CPT

## 2020-11-12 PROCEDURE — 84146 ASSAY OF PROLACTIN: CPT

## 2020-11-13 LAB — ACTH PLAS-MCNC: 7.2 PG/ML (ref 7.2–63.3)

## 2020-11-17 ENCOUNTER — TELEMEDICINE (OUTPATIENT)
Dept: ENDOCRINOLOGY | Facility: MEDICAL CENTER | Age: 50
End: 2020-11-17
Attending: INTERNAL MEDICINE
Payer: COMMERCIAL

## 2020-11-17 DIAGNOSIS — E22.1 HYPERPROLACTINEMIA (HCC): ICD-10-CM

## 2020-11-17 DIAGNOSIS — D35.2 PITUITARY ADENOMA (HCC): ICD-10-CM

## 2020-11-17 PROCEDURE — 99213 OFFICE O/P EST LOW 20 MIN: CPT | Mod: 95,CR | Performed by: INTERNAL MEDICINE

## 2020-11-17 PROCEDURE — 999999 HB NO CHARGE: Mod: 95 | Performed by: INTERNAL MEDICINE

## 2020-11-18 ENCOUNTER — TELEPHONE (OUTPATIENT)
Dept: ENDOCRINOLOGY | Facility: MEDICAL CENTER | Age: 50
End: 2020-11-18

## 2020-11-18 NOTE — TELEPHONE ENCOUNTER
"Telephone conversation with neuro radiologist.    I reviewed the evolution of her pituitary lesion.  In the beginning it appeared to be a hemorrhagic lesion of unknown etiology.  At that time her prolactin was 105.  After being on cabergoline the tumor mass has decreased in size and stabilized.  Now there is residual hemosiderin or proteinaceous material would appear to be the aftermath of having blood in that lesion.  It is not actively bleeding.  When the radiologist says \"represents evolving pituitary hemorrhage\" he does not mean it is getting worse.  He remains it is resolving.  Prolactin has come down as expected with cabergoline so I think it is consistent with a prolactinoma that initially had blood in it but not bleeding actively now.    Elroy Veras M.D.  "

## 2020-11-18 NOTE — PROGRESS NOTES
"Telemedicine Visit: Established Patient     This encounter was conducted via \"Zoom\"  Verbal consent was obtained. Patient's identity was verified.    Subjective:   CC:   Amy Ugalde is a 50 y.o. female presenting for evaluation and management of pituitary adenoma and hyperprolactinemia.  This encounter was facilitated by her niece Tatiana who was our .  The zoom set up did not give good visualization and the audio will sometimes difficult to understand.    HPI      Patient tells me she is feeling very well no headaches or visual disturbance.  She seems robust in her movements and her responses.  I think she understands English pretty well and tries to answer and Tatiana helped a great deal.        She has had a very favorable MRI reading earlier this year with the tumor having decreased in size.  It is not very big to begin with so I do not think it is compromising pituitary function.  My concern is a morning cortisol level of 3.7 with ACTH =7.2 done recently.  IGF-I was normal recently in August at 93 and current thyroid levels are normal with TSH =1.7 and free T4 =1.1 taking no thyroid supplement.         So the low a.m. cortisol concerns may.  She does not have symptoms of adrenal insufficiency.  She is not losing weight she does not have postural dizziness or faintness.  As far as I know she has not had any recent steroids that might suppress her cortisol.  I do not know that for sure.  Krysten is not available for me to discuss.  Also the reading on the MRI mention hemorrhage and I do not know if that is a typo.  There is no neuroradiologist available right now for me to confirm so I will do that tomorrow    ROS   Denies any recent fevers or chills. No nausea or vomiting. No chest pains or shortness of breath.  No COVID-19 exposure or symptoms    No Known Allergies    Current medicines (including changes today)  Current Outpatient Medications   Medication Sig Dispense Refill   • cabergoline " (DOSTINEX) 0.5 MG tablet TAKE 1 TAB BY MOUTH EVERY MONDAY, WEDNESDAY, AND FRIDAY. 32 Tab 2   • cabergoline (DOSTINEX) 0.5 MG tablet Take 1 Tab by mouth every Monday, Wednesday, and Friday. (Patient not taking: Reported on 1/13/2020) 12 Tab 5   • citalopram (CELEXA) 20 MG Tab citalopram 20 mg tablet   Take 1 tablet every day by oral route.     • cabergoline (DOSTINEX) 0.5 MG tablet Dostinex 0.5 mg tablet   Take 1 tablet every week by oral route.     • risperdone (RISPERDAL M-TABS) 2 MG disintegrating tablet risperidone 2 mg disintegrating tablet   Place 1 tablet twice a day by translingual route.     • traZODone (DESYREL) 100 MG Tab trazodone 100 mg tablet   Take 1 tablet twice a day by oral route.     • fluconazole (DIFLUCAN) 150 MG tablet Take 1 Tab by mouth every 48 hours. (Patient not taking: Reported on 1/21/2019) 3 Tab 0   • mometasone (NASONEX) 50 MCG/ACT nasal spray Spray 2 Sprays in nose every day. (Patient not taking: Reported on 1/13/2020) 1 Inhaler 11   • risperidone (RISPERDAL) 2 MG Tab Take 1 Tab by mouth every bedtime. (Patient not taking: Reported on 1/8/2020) 30 Tab 2   • trazodone (DESYREL) 100 MG Tab Take 1 Tab by mouth every day. Lizeth jon antes de dormirse. 30 Tab 2   • citalopram (CELEXA) 20 MG Tab Take 1 Tab by mouth every day. TOME JON TABLETA POR VIA ORAL TODOS LOS CORONADO (Patient not taking: Reported on 1/8/2020) 30 Tab 2     No current facility-administered medications for this visit.        Patient Active Problem List    Diagnosis Date Noted   • Pituitary adenoma (HCC) 01/21/2019   • Pituitary lesion ([facility]) 05/15/2018   • Hyperprolactinemia ([facility]) 05/30/2017   • Amenorrhea 01/06/2016   • Bilateral nipple discharge 01/06/2016   • Other schizophrenia ([facility]) 12/15/2015   • Insomnia 10/30/2013   • Major depression 10/03/2013   • Psychosis ([facility]) 10/03/2013       Family History   Problem Relation Age of Onset   • Lung Disease Mother         emphysema   • Kidney stones Mother        She  has a past  medical history of Depression, Hyperprolactinemia (HCC), Pituitary lesion (HCC), and Schizophrenia (HCC). She also has no past medical history of Arrhythmia, Asthma, Blood transfusion without reported diagnosis, Cancer (HCC), Cataract, CHF (congestive heart failure) (HCC), Clotting disorder (HCC), COPD (chronic obstructive pulmonary disease) (HCC), Diabetes (HCC), Diabetic neuropathy (HCC), GERD (gastroesophageal reflux disease), Glaucoma, Heart attack (HCC), Heart murmur, Hyperlipidemia, Hypertension, Kidney disease, Migraine, Pulmonary emphysema (HCC), Seizure (HCC), Stroke (HCC), Substance abuse (HCC), or Thyroid disease.  She  has a past surgical history that includes gyn surgery; repeat c section w tubal ligation (6/20/2013); primary c section; and pelviscopy.       Objective:   Vitals obtained by patient:        No blood pressure readings.  I asked Tatiana to see if she could get a family member to take some blood pressure readings for us.    Physical Exam: Very poor video with a narrow screen and poor lighting.  I could not see her very well.  Constitutional: Alert, no apparent distress  Skin: No rashes in visible areas.  Eye: Not well seen  ENMT:  Phonation normal.  Robust voice.  Neck:  Moves freely without pain.  CV: No vitals presented  Respiratory: Unlabored respiratory effort, no cough or audible wheeze  Psych: Alert and oriented x3       Assessment and Plan:   The following treatment plan was discussed:     1. Pituitary adenoma (HCC)              Apparently decreasing in size possibly related to cabergoline.              A comment made about hemorrhage which I will have to clarify with the neuroradiologist on review.    2. Hyperprolactinemia (HCC)              Taking cabergoline 0.5 mg 3 days a week.  Current prolactin is 18.4.               No dose change      Follow-up: Repeat morning cortisol and ACTH levels for confirmation                      Reviewed recent MRI with neuro radiologist Re  "\"hemorrhage\"                      Have  family monitor blood pressure and pulse at home           "

## 2020-11-30 ENCOUNTER — TELEPHONE (OUTPATIENT)
Dept: ENDOCRINOLOGY | Facility: MEDICAL CENTER | Age: 50
End: 2020-11-30

## 2020-11-30 NOTE — TELEPHONE ENCOUNTER
Patient came to the office. She was under the assumption that she needed to do lab work last week because Dr. Veras told her that her levels were high. She went to the lab but there was no lab order. Patient would like to know if she does need to do labs. So she could go to [facility] to have them done.

## 2020-12-01 DIAGNOSIS — D35.2 PITUITARY ADENOMA (HCC): ICD-10-CM

## 2020-12-01 NOTE — TELEPHONE ENCOUNTER
Phone Number Called: [phone]    Call outcome: Left detailed message for patient. Informed to call back with any additional questions.    Message: Contacted patient to let her know Dr. Veras would like her to repeat two pituitary hormone tests.  ACTH and cortisol.  She does not have to be fasting but the test should be done in the morning before 10 AM.  He put in a new order in the system. So she may [facility] and get them done. I asked patient to please call us back if she had any questions.

## 2020-12-02 ENCOUNTER — HOSPITAL ENCOUNTER (OUTPATIENT)
Dept: LAB | Facility: MEDICAL CENTER | Age: 50
End: 2020-12-02
Attending: INTERNAL MEDICINE
Payer: COMMERCIAL

## 2020-12-02 DIAGNOSIS — D35.2 PITUITARY ADENOMA (HCC): ICD-10-CM

## 2020-12-02 LAB — CORTIS SERPL-MCNC: 6.8 UG/DL (ref 0–23)

## 2020-12-02 PROCEDURE — 82533 TOTAL CORTISOL: CPT

## 2020-12-02 PROCEDURE — 82024 ASSAY OF ACTH: CPT

## 2020-12-02 PROCEDURE — 36415 COLL VENOUS BLD VENIPUNCTURE: CPT

## 2020-12-04 LAB — ACTH PLAS-MCNC: 22.5 PG/ML (ref 7.2–63.3)

## 2021-02-04 ENCOUNTER — GYNECOLOGY VISIT (OUTPATIENT)
Dept: OBGYN | Facility: CLINIC | Age: 51
End: 2021-02-04
Payer: COMMERCIAL

## 2021-02-04 ENCOUNTER — HOSPITAL ENCOUNTER (OUTPATIENT)
Facility: MEDICAL CENTER | Age: 51
End: 2021-02-04
Attending: OBSTETRICS & GYNECOLOGY
Payer: COMMERCIAL

## 2021-02-04 VITALS — DIASTOLIC BLOOD PRESSURE: 59 MMHG | BODY MASS INDEX: 29.62 KG/M2 | SYSTOLIC BLOOD PRESSURE: 105 MMHG | WEIGHT: 162 LBS

## 2021-02-04 DIAGNOSIS — Z11.51 SCREENING FOR HPV (HUMAN PAPILLOMAVIRUS): ICD-10-CM

## 2021-02-04 DIAGNOSIS — Z01.419 WELL WOMAN EXAM: ICD-10-CM

## 2021-02-04 DIAGNOSIS — Z12.31 ENCOUNTER FOR SCREENING MAMMOGRAM FOR MALIGNANT NEOPLASM OF BREAST: ICD-10-CM

## 2021-02-04 DIAGNOSIS — Z12.4 SCREENING FOR MALIGNANT NEOPLASM OF CERVIX: ICD-10-CM

## 2021-02-04 PROCEDURE — 99396 PREV VISIT EST AGE 40-64: CPT | Performed by: OBSTETRICS & GYNECOLOGY

## 2021-02-04 PROCEDURE — 88175 CYTOPATH C/V AUTO FLUID REDO: CPT

## 2021-02-04 PROCEDURE — 87624 HPV HI-RISK TYP POOLED RSLT: CPT

## 2021-02-04 NOTE — NON-PROVIDER
Patient here for annual exam  Last pap done/result: 12/03/2018, negative  LMP: unknown, doesn't keep track  BCM: BTL  Last mammogram, if applicable: needs order  Phone number: [phone]  Pharmacy verified

## 2021-02-04 NOTE — PROGRESS NOTES
Amy Ugalde50 y.o.  female presents for Annual Well Woman Exam.    ROS: Patient is feeling well. No dyspnea or chest pain on exertion. No Abdominal pain, change in bowel habits, black or bloody stools. No urinary sx. GYN ROS:no breast pain or new or enlarging lumps on self exam, she complains of doesnt remember cycles , but are monthly . Denies breast tenderness, mass, discharge, changes in size or contour, or abnormal cyclic discomfort. No neurological complaints.  Past Medical History:   Diagnosis Date   • Depression    • Hyperprolactinemia (HCC)     probably secondary to medication   • Pituitary lesion (HCC)     probably nonfunctional cyst / pituitary function intact   • Schizophrenia (HCC)        Allergy:   Patient has no known allergies.    LMP:       No LMP recorded (lmp unknown). .     Menstrual History: menses regular every ?? days  Contraceptive Method:none      Objective : The patient appears well, alert and oriented x 3, in no acute distress.  /59 (BP Location: Left arm, Patient Position: Sitting)   Wt 73.5 kg (162 lb)   HEENT Exam: EOMI, PRISCA, no adenopathy or thyromegaly.  Lungs: Clear to Auscultation and Percussion.  Cor: S1 and S2 normal, no murmurs, or rubs   Abdomen: Soft without tenderness, guarding mass or organomegaly.  Extremities: No edema, pulses equal  Neurological: Normal No focal signs  Breast Exam:Inspection negative. No nipple discharge or bleeding. No masses or nodularity palpable, negative findings: normal in size and symmetry, normal contour with no evidence of flattening or dimpling, skin normal, nipples everted without rashes or discharge  Pelvic: External genitalia,urethral meatus, urethra, bladder and vagina normal. Cervix, uterus and adnexa intact and normal.  Anus and perineum normal. Bimanual and rectovaginal without masses or tenderness.    Lab:No results found for this or any previous visit (from the past 336 hour(s)).    Assessment:  well  woman    Plan:mammogram  pap smear  return annually or prn  Contraception:none

## 2021-02-16 ENCOUNTER — TELEPHONE (OUTPATIENT)
Dept: ENDOCRINOLOGY | Facility: MEDICAL CENTER | Age: 51
End: 2021-02-16

## 2021-02-16 NOTE — TELEPHONE ENCOUNTER
Patient I requesting lab orders for her appt on 2/22. She says she always gets an order to do prior to her f/v. Please call to notify if f/v labs are ordered.

## 2021-02-17 ENCOUNTER — TELEPHONE (OUTPATIENT)
Dept: ENDOCRINOLOGY | Facility: MEDICAL CENTER | Age: 51
End: 2021-02-17

## 2021-02-18 NOTE — TELEPHONE ENCOUNTER
Patient has a appt scheduled on 2/22 with Dr. Veras. She wants to know if f/v labs are required for this visit.    Please call at [phone] if any labs are ordered.

## 2021-02-22 ENCOUNTER — TELEMEDICINE (OUTPATIENT)
Dept: ENDOCRINOLOGY | Facility: MEDICAL CENTER | Age: 51
End: 2021-02-22
Attending: INTERNAL MEDICINE
Payer: COMMERCIAL

## 2021-02-22 DIAGNOSIS — E22.1 HYPERPROLACTINEMIA (HCC): ICD-10-CM

## 2021-02-22 DIAGNOSIS — D35.2 PITUITARY ADENOMA (HCC): ICD-10-CM

## 2021-02-22 PROCEDURE — 99214 OFFICE O/P EST MOD 30 MIN: CPT | Mod: 95,CR | Performed by: INTERNAL MEDICINE

## 2021-02-22 RX ORDER — CABERGOLINE 0.5 MG/1
0.5 TABLET ORAL
Qty: 36 TABLET | Refills: 2 | Status: SHIPPED | OUTPATIENT
Start: 2021-02-22 | End: 2021-04-09

## 2021-02-23 NOTE — PROGRESS NOTES
Telemedicine Visit: Established Patient     This encounter was conducted via ZOOM.   Verbal consent was obtained. Patient's identity was verified.    Subjective:   CC:   Amy Ugalde is a 50 y.o. female presenting for evaluation and management of pituitary adenoma, hyperprolactinemia.  This virtual encounter was facilitated with her daughter Krysten who also did interpreting.    HPI     The crux of this issue is the patient has a small pituitary adenoma about 10 mm and hyperprolactinemia.  She is taking Risperdal which is a very important medication for her and should not stop it.  Therefore we do not know if that is responsible for the elevated prolactin or the tumor.  Since being on cabergoline her pituitary tumor has decreased slightly but not to the extent that I would assume if it were truly a prolactinoma.         She does not have significant headaches.  There is no visual impairment and she has had eye examinations.  She does not have galactorrhea and she does continue to have regular menses.        The other issue has been a relatively low cortisol level of six-point 8 AM draw but with a good ACTH level of 22.  She does not at all appear to have adrenal insufficiency and does not have associated symptoms.    ROS   Denies any recent fevers or chills. No nausea or vomiting. No chest pains or shortness of breath.         All other systems reported as negative or unchanged since last exam    No Known Allergies    Current medicines (including changes today)  Current Outpatient Medications   Medication Sig Dispense Refill   • cabergoline (DOSTINEX) 0.5 MG tablet TAKE 1 TAB BY MOUTH EVERY MONDAY, WEDNESDAY, AND FRIDAY. 32 Tab 2   • cabergoline (DOSTINEX) 0.5 MG tablet Take 1 Tab by mouth every Monday, Wednesday, and Friday. (Patient not taking: Reported on 1/13/2020) 12 Tab 5   • citalopram (CELEXA) 20 MG Tab citalopram 20 mg tablet   Take 1 tablet every day by oral route.     • cabergoline (DOSTINEX) 0.5 MG  tablet Dostinex 0.5 mg tablet   Take 1 tablet every week by oral route.     • risperdone (RISPERDAL M-TABS) 2 MG disintegrating tablet risperidone 2 mg disintegrating tablet   Place 1 tablet twice a day by translingual route.     • traZODone (DESYREL) 100 MG Tab trazodone 100 mg tablet   Take 1 tablet twice a day by oral route.     • fluconazole (DIFLUCAN) 150 MG tablet Take 1 Tab by mouth every 48 hours. (Patient not taking: Reported on 1/21/2019) 3 Tab 0   • mometasone (NASONEX) 50 MCG/ACT nasal spray Spray 2 Sprays in nose every day. (Patient not taking: Reported on 1/13/2020) 1 Inhaler 11   • risperidone (RISPERDAL) 2 MG Tab Take 1 Tab by mouth every bedtime. (Patient not taking: Reported on 1/8/2020) 30 Tab 2   • trazodone (DESYREL) 100 MG Tab Take 1 Tab by mouth every day. Lizeth jon antes de dormirse. 30 Tab 2   • citalopram (CELEXA) 20 MG Tab Take 1 Tab by mouth every day. TOME JON TABLETA POR VIA ORAL TODOS LOS CORONADO (Patient not taking: Reported on 1/8/2020) 30 Tab 2     No current facility-administered medications for this visit.       Patient Active Problem List    Diagnosis Date Noted   • Pituitary adenoma (HCC) 01/21/2019   • Pituitary lesion (HCC) 05/15/2018   • Hyperprolactinemia ([facility]) 05/30/2017   • Amenorrhea 01/06/2016   • Bilateral nipple discharge 01/06/2016   • Other schizophrenia ([facility]) 12/15/2015   • Insomnia 10/30/2013   • Major depression 10/03/2013   • Psychosis ([facility]) 10/03/2013       Family History   Problem Relation Age of Onset   • Lung Disease Mother         emphysema   • Kidney stones Mother        She  has a past medical history of Depression, Hyperprolactinemia ([facility]), Pituitary lesion ([facility]), and Schizophrenia ([facility]). She also has no past medical history of Arrhythmia, Asthma, Blood transfusion without reported diagnosis, Cancer ([facility]), Cataract, CHF (congestive heart failure) ([facility]), Clotting disorder ([facility]), COPD (chronic obstructive pulmonary disease) ([facility]), Diabetes ([facility]), Diabetic  neuropathy (HCC), GERD (gastroesophageal reflux disease), Glaucoma, Heart attack (HCC), Heart murmur, Hyperlipidemia, Hypertension, Kidney disease, Migraine, Pulmonary emphysema (HCC), Seizure (HCC), Stroke (HCC), Substance abuse (HCC), or Thyroid disease.  She  has a past surgical history that includes gyn surgery; repeat c section w tubal ligation (6/20/2013); primary c section; and pelviscopy.       Objective:   Vitals obtained by patient:       None reported    weight is 165 pounds and stable.   Height 5 feet 2 inches    Physical Exam:  Constitutional: Appears overweight but not cushingoid.   Skin: No rashes in visible areas.  Eye: Unremarkable  ENMT:  Phonation normal.  Neck: No obvious thyromegaly or nodules  CV: Pulse not monitored  Respiratory: Unlabored respiratory effort, no cough or audible wheeze  Psych: Alert and oriented x3, normal affect and mood.       Assessment and Plan:   The following treatment plan was discussed:     1. Pituitary adenoma (HCC)            See HPI            Stable on MRI as of March 2020 10 mm    2. Hyperprolactinemia (HCC)           Question if this is due to a prolactinoma or Risperdal           Most recent prolactin level is 18 last November           Taking cabergoline 0.5 mg 3 days a week    Follow-up: Update lab and discuss by telephone with                      Daughter Krysten                       If stable return in 3 months

## 2021-02-26 ENCOUNTER — HOSPITAL ENCOUNTER (OUTPATIENT)
Dept: RADIOLOGY | Facility: MEDICAL CENTER | Age: 51
End: 2021-02-26
Attending: NURSE PRACTITIONER
Payer: COMMERCIAL

## 2021-02-26 DIAGNOSIS — Z12.31 VISIT FOR SCREENING MAMMOGRAM: ICD-10-CM

## 2021-02-26 PROCEDURE — 77063 BREAST TOMOSYNTHESIS BI: CPT

## 2021-03-02 ENCOUNTER — HOSPITAL ENCOUNTER (OUTPATIENT)
Dept: LAB | Facility: MEDICAL CENTER | Age: 51
End: 2021-03-02
Attending: INTERNAL MEDICINE
Payer: COMMERCIAL

## 2021-03-02 DIAGNOSIS — E22.1 HYPERPROLACTINEMIA (HCC): ICD-10-CM

## 2021-03-02 DIAGNOSIS — D35.2 PITUITARY ADENOMA (HCC): ICD-10-CM

## 2021-03-02 LAB
ANION GAP SERPL CALC-SCNC: 15 MMOL/L (ref 7–16)
BUN SERPL-MCNC: 7 MG/DL (ref 8–22)
CALCIUM SERPL-MCNC: 9.2 MG/DL (ref 8.5–10.5)
CHLORIDE SERPL-SCNC: 100 MMOL/L (ref 96–112)
CO2 SERPL-SCNC: 22 MMOL/L (ref 20–33)
CREAT SERPL-MCNC: 0.52 MG/DL (ref 0.5–1.4)
GLUCOSE SERPL-MCNC: 91 MG/DL (ref 65–99)
POTASSIUM SERPL-SCNC: 4.1 MMOL/L (ref 3.6–5.5)
SODIUM SERPL-SCNC: 137 MMOL/L (ref 135–145)

## 2021-03-02 PROCEDURE — 80048 BASIC METABOLIC PNL TOTAL CA: CPT

## 2021-03-02 PROCEDURE — 84305 ASSAY OF SOMATOMEDIN: CPT

## 2021-03-02 PROCEDURE — 82024 ASSAY OF ACTH: CPT

## 2021-03-02 PROCEDURE — 84146 ASSAY OF PROLACTIN: CPT

## 2021-03-02 PROCEDURE — 82533 TOTAL CORTISOL: CPT

## 2021-03-02 PROCEDURE — 36415 COLL VENOUS BLD VENIPUNCTURE: CPT

## 2021-03-03 LAB
CORTIS SERPL-MCNC: 7.2 UG/DL (ref 0–23)
PROLACTIN SERPL-MCNC: 40.4 NG/ML (ref 2.8–26)

## 2021-03-04 LAB
ACTH PLAS-MCNC: 20 PG/ML (ref 7.2–63.3)
IGF-I SERPL-MCNC: 94 NG/ML (ref 57–236)
IGF-I Z-SCORE SERPL: -0.8

## 2021-03-14 ENCOUNTER — TELEPHONE (OUTPATIENT)
Dept: ENDOCRINOLOGY | Facility: MEDICAL CENTER | Age: 51
End: 2021-03-14

## 2021-03-14 DIAGNOSIS — E22.1 HYPERPROLACTINEMIA (HCC): ICD-10-CM

## 2021-03-14 DIAGNOSIS — D35.2 PITUITARY ADENOMA (HCC): ICD-10-CM

## 2021-03-14 NOTE — TELEPHONE ENCOUNTER
Telephone conversation with daughter Krysten    Laboratory data reviewed.  Prolactin level is up to 40 whereas previously was normal.  She is certain her mother does take her cabergoline 3 days a week but I asked her to get a weekly pill organizer just to be sure.-If change her dose.  I would like to confirm this level next month with another test    Morning cortisol level only 7.2 but about mid morning at 930.  ACTH okay at 20.    Discuss again in 1 month.    Elroy Veras M.D.

## 2021-03-31 ENCOUNTER — IMMUNIZATION (OUTPATIENT)
Dept: FAMILY PLANNING/WOMEN'S HEALTH CLINIC | Facility: IMMUNIZATION CENTER | Age: 51
End: 2021-03-31
Payer: COMMERCIAL

## 2021-03-31 DIAGNOSIS — Z23 ENCOUNTER FOR VACCINATION: Primary | ICD-10-CM

## 2021-03-31 PROCEDURE — 0001A PFIZER SARS-COV-2 VACCINE: CPT | Performed by: INTERNAL MEDICINE

## 2021-03-31 PROCEDURE — 91300 PFIZER SARS-COV-2 VACCINE: CPT | Performed by: INTERNAL MEDICINE

## 2021-04-09 ENCOUNTER — OFFICE VISIT (OUTPATIENT)
Dept: MEDICAL GROUP | Facility: PHYSICIAN GROUP | Age: 51
End: 2021-04-09
Payer: COMMERCIAL

## 2021-04-09 VITALS
DIASTOLIC BLOOD PRESSURE: 59 MMHG | BODY MASS INDEX: 29.15 KG/M2 | WEIGHT: 158.4 LBS | HEART RATE: 82 BPM | OXYGEN SATURATION: 96 % | SYSTOLIC BLOOD PRESSURE: 100 MMHG | TEMPERATURE: 97.8 F | HEIGHT: 62 IN | RESPIRATION RATE: 14 BRPM

## 2021-04-09 DIAGNOSIS — F20.89 OTHER SCHIZOPHRENIA (HCC): ICD-10-CM

## 2021-04-09 DIAGNOSIS — L98.9 SKIN LESIONS: ICD-10-CM

## 2021-04-09 DIAGNOSIS — D35.2 PITUITARY ADENOMA (HCC): ICD-10-CM

## 2021-04-09 PROCEDURE — 99204 OFFICE O/P NEW MOD 45 MIN: CPT | Performed by: FAMILY MEDICINE

## 2021-04-09 NOTE — ASSESSMENT & PLAN NOTE
Chronic issue  Following up with the psychiatry department with Dr Quick. Follows up every 6 month.   Is on Risperdal citalopram and trazodone  
Chronic issue  Has had some now developing moles to the face and growing over the past year  She would like these evaluated by dermatology  No family hx of cancer  Has been using 15SPF  
This is a chronic condition.  Noted to also have hyperprolactinemia either due to this condition or the fact that she is on Risperdal.  Currently managed by endocrinology with Dr. Veras.  She is taking cabergoline 3 times out of the week.    
97

## 2021-04-09 NOTE — PROGRESS NOTES
Subjective:     CC:  Diagnoses of Pituitary adenoma (HCC), Other schizophrenia (HCC), and Skin lesions were pertinent to this visit.    HISTORY OF THE PRESENT ILLNESS: Patient is a 51 y.o. female. This pleasant patient is here today to establish care and discuss the following problems.   Prior PCP: DEYANIRA Denton    Pituitary adenoma (HCC)  This is a chronic condition.  Noted to also have hyperprolactinemia either due to this condition or the fact that she is on Risperdal.  Currently managed by endocrinology with Dr. Veras.  She is taking cabergoline 3 times out of the week.      Other schizophrenia  Chronic issue  Following up with the psychiatry department with Dr Quick. Follows up every 6 month.   Is on Risperdal citalopram and trazodone    Skin lesions  Chronic issue  Has had some now developing moles to the face and growing over the past year  She would like these evaluated by dermatology  No family hx of cancer  Has been using 15SPF      Allergies: Patient has no known allergies.    Current Outpatient Medications Ordered in Epic   Medication Sig Dispense Refill   • cabergoline (DOSTINEX) 0.5 MG tablet Take 1 Tab by mouth every Monday, Wednesday, and Friday. 12 Tab 5   • traZODone (DESYREL) 100 MG Tab trazodone 100 mg tablet   Take 1 tablet twice a day by oral route.     • risperidone (RISPERDAL) 2 MG Tab Take 1 Tab by mouth every bedtime. 30 Tab 2   • citalopram (CELEXA) 20 MG Tab Take 1 Tab by mouth every day. TOME JON TABLETA POR VIA ORAL TODOS LOS CORONADO 30 Tab 2     No current Epic-ordered facility-administered medications on file.       Past Medical History:   Diagnosis Date   • Depression    • Hyperprolactinemia (HCC)     probably secondary to medication   • Pituitary lesion (HCC)     probably nonfunctional cyst / pituitary function intact   • Schizophrenia (HCC)        Past Surgical History:   Procedure Laterality Date   • REPEAT C SECTION W TUBAL LIGATION  6/20/2013    Performed by Marylu ENGLE  "[facility]MAEL Sanchez [facility]   • GYN SURGERY      csection   • PELVISCOPY      for fertility   • PRIMARY C SECTION         Social History     Tobacco Use   • Smoking status: Never Smoker   • Smokeless tobacco: Never Used   Substance Use Topics   • Alcohol use: No   • Drug use: No       Social History     Social History Narrative   • Not on file       Family History   Problem Relation Age of Onset   • Lung Disease Mother         emphysema   • Kidney stones Mother        Health Maintenance: Completed    ROS:   Gen: no fevers/chills, no changes in weight  Eyes: no changes in vision  ENT: no sore throat, no hearing loss, no bloody nose  Pulm: no sob, no cough        Objective:     Exam: /59 (BP Location: Left arm, Patient Position: Sitting, BP Cuff Size: Adult)   Pulse 82   Temp 36.6 °C (97.8 °F) (Temporal)   Resp 14   Ht 1.575 m (5' 2\")   Wt 71.8 kg (158 lb 6.4 oz)   SpO2 96%  Body mass index is 28.97 kg/m².    Constitutional: Alert, no distress, well-groomed.  Skin: Warm, dry, good turgor, no rashes in visible areas.  There is evidence of moles localized to the bridge of her nose also to the temporal side of her head each measuring approximately 3 mm in diameter.  They are flesh-colored with well defined borders.  Slightly raised.  Eye: Equal, round and reactive, conjunctiva clear, lids normal.  ENMT: Lips without lesions, good dentition, moist mucous membranes.  Neck: Trachea midline, no masses, no thyromegaly.  Respiratory: Unlabored respiratory effort, no cough.  MSK: Normal gait, moves all extremities.  Neuro: Grossly non-focal.   Psych: Alert and oriented x3, normal affect and mood.      Assessment & Plan:   51 y.o. female with the following -    1. Pituitary adenoma (HCC)  Chronic, stable condition.  Following up with endocrinology with Dr. Veras.  On cabergoline 3 times out of the week.  Gets frequent blood testing to monitor prolactin level as well.  -Continue to follow-up with " endocrinology    2. Other schizophrenia (HCC)  Chronic, stable condition.  Following up with psychiatry.  She is on Risperdal, trazodone and Celexa.  Denies any breakthrough psychosis at this time.  -Continue to follow-up with psychiatry    3. Skin lesions  This is a new problem.  Over the past year the patient has been developing growing moles on her face specifically localized to her nose temporal region.  Uses sunscreen.  However, she would like to establish with dermatology to assess these.  - REFERRAL TO DERMATOLOGY      Return in about 6 months (around 10/9/2021).    Please note that this dictation was created using voice recognition software. I have made every reasonable attempt to correct obvious errors, but I expect that there are errors of grammar and possibly content that I did not discover before finalizing the note.

## 2021-04-23 ENCOUNTER — IMMUNIZATION (OUTPATIENT)
Dept: FAMILY PLANNING/WOMEN'S HEALTH CLINIC | Facility: IMMUNIZATION CENTER | Age: 51
End: 2021-04-23
Payer: COMMERCIAL

## 2021-04-23 DIAGNOSIS — Z23 ENCOUNTER FOR VACCINATION: Primary | ICD-10-CM

## 2021-04-23 PROCEDURE — 0002A PFIZER SARS-COV-2 VACCINE: CPT

## 2021-04-23 PROCEDURE — 91300 PFIZER SARS-COV-2 VACCINE: CPT

## 2021-05-19 ENCOUNTER — HOSPITAL ENCOUNTER (OUTPATIENT)
Dept: LAB | Facility: MEDICAL CENTER | Age: 51
End: 2021-05-19
Attending: INTERNAL MEDICINE
Payer: COMMERCIAL

## 2021-05-19 DIAGNOSIS — E22.1 HYPERPROLACTINEMIA (HCC): ICD-10-CM

## 2021-05-19 DIAGNOSIS — D35.2 PITUITARY ADENOMA (HCC): ICD-10-CM

## 2021-05-19 LAB — PROLACTIN SERPL-MCNC: 13.4 NG/ML (ref 2.8–26)

## 2021-05-19 PROCEDURE — 84146 ASSAY OF PROLACTIN: CPT

## 2021-05-19 PROCEDURE — 36415 COLL VENOUS BLD VENIPUNCTURE: CPT

## 2021-05-24 ENCOUNTER — APPOINTMENT (OUTPATIENT)
Dept: ENDOCRINOLOGY | Facility: MEDICAL CENTER | Age: 51
End: 2021-05-24
Attending: INTERNAL MEDICINE
Payer: COMMERCIAL

## 2021-06-19 ENCOUNTER — HOSPITAL ENCOUNTER (OUTPATIENT)
Dept: LAB | Facility: MEDICAL CENTER | Age: 51
End: 2021-06-19
Attending: INTERNAL MEDICINE
Payer: COMMERCIAL

## 2021-06-19 DIAGNOSIS — E22.1 HYPERPROLACTINEMIA (HCC): ICD-10-CM

## 2021-06-19 LAB — PROLACTIN SERPL-MCNC: 32.7 NG/ML (ref 2.8–26)

## 2021-06-19 PROCEDURE — 36415 COLL VENOUS BLD VENIPUNCTURE: CPT

## 2021-06-19 PROCEDURE — 84146 ASSAY OF PROLACTIN: CPT

## 2021-06-19 RX ORDER — CABERGOLINE 0.5 MG/1
0.5 TABLET ORAL
Qty: 12 TABLET | Refills: 5 | Status: SHIPPED | OUTPATIENT
Start: 2021-06-21 | End: 2021-07-17

## 2021-07-19 ENCOUNTER — HOSPITAL ENCOUNTER (OUTPATIENT)
Dept: LAB | Facility: MEDICAL CENTER | Age: 51
End: 2021-07-19
Attending: INTERNAL MEDICINE
Payer: COMMERCIAL

## 2021-07-19 DIAGNOSIS — E22.1 HYPERPROLACTINEMIA (HCC): ICD-10-CM

## 2021-07-19 LAB — PROLACTIN SERPL-MCNC: 29.3 NG/ML (ref 2.8–26)

## 2021-07-19 PROCEDURE — 36415 COLL VENOUS BLD VENIPUNCTURE: CPT

## 2021-07-19 PROCEDURE — 84146 ASSAY OF PROLACTIN: CPT

## 2021-08-20 ENCOUNTER — HOSPITAL ENCOUNTER (OUTPATIENT)
Dept: LAB | Facility: MEDICAL CENTER | Age: 51
End: 2021-08-20
Attending: INTERNAL MEDICINE
Payer: COMMERCIAL

## 2021-08-20 DIAGNOSIS — E22.1 HYPERPROLACTINEMIA (HCC): ICD-10-CM

## 2021-08-20 LAB — PROLACTIN SERPL-MCNC: 23.5 NG/ML (ref 2.8–26)

## 2021-08-20 PROCEDURE — 36415 COLL VENOUS BLD VENIPUNCTURE: CPT

## 2021-08-20 PROCEDURE — 84146 ASSAY OF PROLACTIN: CPT

## 2021-09-12 DIAGNOSIS — E22.1 HYPERPROLACTINEMIA (HCC): ICD-10-CM

## 2021-10-14 ENCOUNTER — OFFICE VISIT (OUTPATIENT)
Dept: MEDICAL GROUP | Facility: PHYSICIAN GROUP | Age: 51
End: 2021-10-14
Payer: COMMERCIAL

## 2021-10-14 VITALS
SYSTOLIC BLOOD PRESSURE: 102 MMHG | HEART RATE: 74 BPM | TEMPERATURE: 98.2 F | DIASTOLIC BLOOD PRESSURE: 62 MMHG | BODY MASS INDEX: 30.18 KG/M2 | WEIGHT: 164 LBS | OXYGEN SATURATION: 96 % | HEIGHT: 62 IN

## 2021-10-14 DIAGNOSIS — Z12.12 SCREENING FOR COLORECTAL CANCER: ICD-10-CM

## 2021-10-14 DIAGNOSIS — Z12.11 SCREENING FOR COLORECTAL CANCER: ICD-10-CM

## 2021-10-14 DIAGNOSIS — Z00.00 WELL WOMAN EXAM (NO GYNECOLOGICAL EXAM): ICD-10-CM

## 2021-10-14 DIAGNOSIS — J30.2 SEASONAL ALLERGIES: ICD-10-CM

## 2021-10-14 PROCEDURE — 99214 OFFICE O/P EST MOD 30 MIN: CPT | Performed by: FAMILY MEDICINE

## 2021-10-14 RX ORDER — AZELASTINE 1 MG/ML
1 SPRAY, METERED NASAL 2 TIMES DAILY
Qty: 30 ML | Refills: 3 | Status: SHIPPED | OUTPATIENT
Start: 2021-10-14 | End: 2021-11-08

## 2021-10-14 NOTE — ASSESSMENT & PLAN NOTE
Chronic issue  Has had seasonal allergies worse during this time  Has had nasal congestion, itchy ears and throat  Zyrtec helps but needs something stronger

## 2021-10-14 NOTE — PROGRESS NOTES
"Subjective:     Chief Complaint   Patient presents with   • Other     Itchy throat, X1mo   • Ear Pain     left ear, and itching in both ears   • Sinus Problem     x1mo       HPI:   Amy presents today to discuss the following.    Seasonal allergies  Chronic issue  Has had seasonal allergies worse during this time  Has had nasal congestion, itchy ears and throat  Zyrtec helps but needs something stronger      Past Medical History:   Diagnosis Date   • Depression    • Hyperprolactinemia (HCC)     probably secondary to medication   • Pituitary lesion (HCC)     probably nonfunctional cyst / pituitary function intact   • Schizophrenia (HCC)        Current Outpatient Medications Ordered in Epic   Medication Sig Dispense Refill   • azelastine (ASTELIN) 137 MCG/SPRAY nasal spray Administer 1 Spray into affected nostril(S) 2 times a day. 30 mL 3   • cabergoline (DOSTINEX) 0.5 MG tablet Take 1 Tab by mouth every Monday, Wednesday, and Friday. 12 Tab 5   • traZODone (DESYREL) 100 MG Tab trazodone 100 mg tablet   Take 1 tablet twice a day by oral route.     • risperidone (RISPERDAL) 2 MG Tab Take 1 Tab by mouth every bedtime. 30 Tab 2   • citalopram (CELEXA) 20 MG Tab Take 1 Tab by mouth every day. TOME JON TABLETA POR VIA ORAL TODOS LOS CORONADO 30 Tab 2     No current Epic-ordered facility-administered medications on file.       Allergies:  Patient has no known allergies.    Health Maintenance: Completed    ROS:  Gen: no fevers/chills, no changes in weight  Eyes: no changes in vision  Pulm: no sob, no cough  CV: no chest pain, no palpitations  GI: no nausea/vomiting, no diarrhea      Objective:     Exam:  /62 (BP Location: Left arm, Patient Position: Sitting, BP Cuff Size: Adult)   Pulse 74   Temp 36.8 °C (98.2 °F) (Temporal)   Ht 1.575 m (5' 2\")   Wt 74.4 kg (164 lb)   SpO2 96%   BMI 30.00 kg/m²  Body mass index is 30 kg/m².      Constitutional: Alert, no distress, well-groomed.  Skin: Warm, dry, good turgor, no " rashes in visible areas.  Eye: Equal, round and reactive, conjunctiva clear, lids normal.  ENMT: Lips without lesions, good dentition, moist mucous membranes.  Neck: Trachea midline, no masses, no thyromegaly.  Respiratory: Unlabored respiratory effort, no cough.  MSK: Normal gait, moves all extremities.  Neuro: Grossly non-focal.   Psych: Alert and oriented x3, normal affect and mood.        Assessment & Plan:     51 y.o. female with the following -     1. Seasonal allergies  Chronic, worsening condition.  The patient has had worsening seasonal allergies specially around this time a year.  She is endorsing itchy ears throat partially relieved by Zyrtec over-the-counter.  At this time I would like to do a trial of prescription azelastine and monitor for improvement.  - azelastine (ASTELIN) 137 MCG/SPRAY nasal spray; Administer 1 Spray into affected nostril(S) 2 times a day.  Dispense: 30 mL; Refill: 3    2. Screening for colorectal cancer  - REFERRAL TO GI FOR COLONOSCOPY    3. Well woman exam (no gynecological exam)  - CBC WITHOUT DIFFERENTIAL; Future  - Comp Metabolic Panel; Future  - HEMOGLOBIN A1C; Future  - Lipid Profile; Future      Return in about 3 months (around 1/14/2022).    Please note that this dictation was created using voice recognition software. I have made every reasonable attempt to correct obvious errors, but I expect that there are errors of grammar and possibly content that I did not discover before finalizing the note.

## 2021-11-01 ENCOUNTER — OFFICE VISIT (OUTPATIENT)
Dept: MEDICAL GROUP | Facility: PHYSICIAN GROUP | Age: 51
End: 2021-11-01
Payer: COMMERCIAL

## 2021-11-01 VITALS
WEIGHT: 163 LBS | HEIGHT: 62 IN | BODY MASS INDEX: 30 KG/M2 | OXYGEN SATURATION: 95 % | HEART RATE: 78 BPM | DIASTOLIC BLOOD PRESSURE: 60 MMHG | SYSTOLIC BLOOD PRESSURE: 104 MMHG | TEMPERATURE: 98.2 F

## 2021-11-01 DIAGNOSIS — H93.8X3 SENSATION OF FULLNESS IN BOTH EARS: ICD-10-CM

## 2021-11-01 PROCEDURE — 99213 OFFICE O/P EST LOW 20 MIN: CPT | Performed by: FAMILY MEDICINE

## 2021-11-01 NOTE — ASSESSMENT & PLAN NOTE
"New issue  Has had b/l ear fullness over the past month  Feel like \"going over the hill\"   Has not tried anything for it   Endorses seasonal allergies     "

## 2021-11-01 NOTE — PROGRESS NOTES
"Subjective:     Chief Complaint   Patient presents with   • Ear Fullness     loud noices, cannot hear       HPI:   Amy presents today to discuss the following.      Sensation of fullness in both ears  New issue  Has had b/l ear fullness over the past month  Feel like \"going over the hill\"   Has not tried anything for it   Endorses seasonal allergies         Past Medical History:   Diagnosis Date   • Depression    • Hyperprolactinemia (HCC)     probably secondary to medication   • Pituitary lesion (HCC)     probably nonfunctional cyst / pituitary function intact   • Schizophrenia (HCC)        Current Outpatient Medications Ordered in Epic   Medication Sig Dispense Refill   • azelastine (ASTELIN) 137 MCG/SPRAY nasal spray Administer 1 Spray into affected nostril(S) 2 times a day. 30 mL 3   • cabergoline (DOSTINEX) 0.5 MG tablet Take 1 Tab by mouth every Monday, Wednesday, and Friday. 12 Tab 5   • traZODone (DESYREL) 100 MG Tab trazodone 100 mg tablet   Take 1 tablet twice a day by oral route.     • risperidone (RISPERDAL) 2 MG Tab Take 1 Tab by mouth every bedtime. 30 Tab 2   • citalopram (CELEXA) 20 MG Tab Take 1 Tab by mouth every day. TOME JON TABLETA POR VIA ORAL TODOS LOS CORONADO 30 Tab 2     No current Epic-ordered facility-administered medications on file.       Allergies:  Patient has no known allergies.    Health Maintenance: Completed    ROS:  Gen: no fevers/chills, no changes in weight  Eyes: no changes in vision  Pulm: no sob, no cough  CV: no chest pain, no palpitations  GI: no nausea/vomiting, no diarrhea      Objective:     Exam:  /60 (BP Location: Right arm, Patient Position: Sitting, BP Cuff Size: Adult)   Pulse 78   Temp 36.8 °C (98.2 °F) (Temporal)   Ht 1.575 m (5' 2\")   Wt 73.9 kg (163 lb)   SpO2 95%   BMI 29.81 kg/m²  Body mass index is 29.81 kg/m².    Gen: Alert and oriented, No apparent distress.  HENT: TMs are clear. Oropharynx is w/o erythema or exudates. Neck is supple without " cervical lymphadenopathy. No JVD.   Eyes: PERRLA  Lungs: Normal effort, CTA bilaterally, no wheezes, rhonchi, or rales  CV: Regular rate and rhythm. No murmurs, rubs, or gallops.  Abdomen: Soft, nontender, nondistended. Normal bowel sounds. Liver and spleen are not palpable  Ext: No clubbing, cyanosis, edema.  Skin: no rash, lesions or ulcers  Neuro: Moves all extremities.  Psych: AAOx3      Assessment & Plan:     51 y.o. female with the following -     1. Sensation of fullness in both ears  This is a new problem.  Clinical presentation is classic for eustachian tube dysfunction.  Physical exam is benign.  Seasonal allergies appear to be worsening the condition.  As such I do recommend a trial of Flonase plus Allegra-D.      No follow-ups on file.    Please note that this dictation was created using voice recognition software. I have made every reasonable attempt to correct obvious errors, but I expect that there are errors of grammar and possibly content that I did not discover before finalizing the note.

## 2021-11-07 DIAGNOSIS — J30.2 SEASONAL ALLERGIES: ICD-10-CM

## 2021-11-08 RX ORDER — AZELASTINE 1 MG/ML
1 SPRAY, METERED NASAL 2 TIMES DAILY
Qty: 30 G | Refills: 0 | Status: SHIPPED | OUTPATIENT
Start: 2021-11-08 | End: 2021-12-07

## 2021-11-19 ENCOUNTER — HOSPITAL ENCOUNTER (OUTPATIENT)
Dept: LAB | Facility: MEDICAL CENTER | Age: 51
End: 2021-11-19
Attending: INTERNAL MEDICINE
Payer: COMMERCIAL

## 2021-11-19 DIAGNOSIS — E22.1 HYPERPROLACTINEMIA (HCC): ICD-10-CM

## 2021-11-19 LAB — PROLACTIN SERPL-MCNC: 23.3 NG/ML (ref 2.8–26)

## 2021-11-19 PROCEDURE — 36415 COLL VENOUS BLD VENIPUNCTURE: CPT

## 2021-11-19 PROCEDURE — 84146 ASSAY OF PROLACTIN: CPT

## 2021-11-24 DIAGNOSIS — E22.1 HYPERPROLACTINEMIA (HCC): ICD-10-CM

## 2021-11-24 RX ORDER — CABERGOLINE 0.5 MG/1
TABLET ORAL
Qty: 36 TABLET | Refills: 1 | Status: SHIPPED | OUTPATIENT
Start: 2021-11-24 | End: 2022-01-19

## 2021-11-24 NOTE — TELEPHONE ENCOUNTER
Received request via: Pharmacy    Was the patient seen in the last year in this department? Yes    Does the patient have an active prescription (recently filled or refills available) for medication(s) requested? No     REQUESTED MEDICATION:   Requested Prescriptions     Pending Prescriptions Disp Refills   • cabergoline (DOSTINEX) 0.5 MG tablet [Pharmacy Med Name: CABERGOLINE 0.5 MG TABLET] 36 Tablet 1     Sig: TAKE 1 TABLET BY MOUTH EVERY MONDAY, WEDNESDAY, AND FRIDAY FOR 12 DOSES.

## 2021-12-07 DIAGNOSIS — J30.2 SEASONAL ALLERGIES: ICD-10-CM

## 2021-12-07 RX ORDER — AZELASTINE 1 MG/ML
1 SPRAY, METERED NASAL 2 TIMES DAILY
Qty: 30 ML | Refills: 3 | Status: SHIPPED | OUTPATIENT
Start: 2021-12-07 | End: 2023-05-23

## 2022-01-11 ENCOUNTER — TELEPHONE (OUTPATIENT)
Dept: ENDOCRINOLOGY | Facility: MEDICAL CENTER | Age: 52
End: 2022-01-11

## 2022-01-12 DIAGNOSIS — E22.1 HYPERPROLACTINEMIA (HCC): ICD-10-CM

## 2022-01-12 DIAGNOSIS — D35.2 PITUITARY ADENOMA (HCC): ICD-10-CM

## 2022-01-15 ENCOUNTER — HOSPITAL ENCOUNTER (OUTPATIENT)
Dept: LAB | Facility: MEDICAL CENTER | Age: 52
End: 2022-01-15
Payer: COMMERCIAL

## 2022-01-15 DIAGNOSIS — D35.2 PITUITARY ADENOMA (HCC): ICD-10-CM

## 2022-01-15 DIAGNOSIS — E22.1 HYPERPROLACTINEMIA (HCC): ICD-10-CM

## 2022-01-15 LAB
ALBUMIN SERPL BCP-MCNC: 4.3 G/DL (ref 3.2–4.9)
ALBUMIN/GLOB SERPL: 1.4 G/DL
ALP SERPL-CCNC: 93 U/L (ref 30–99)
ALT SERPL-CCNC: 5 U/L (ref 2–50)
ANION GAP SERPL CALC-SCNC: 10 MMOL/L (ref 7–16)
AST SERPL-CCNC: 9 U/L (ref 12–45)
BILIRUB SERPL-MCNC: 0.4 MG/DL (ref 0.1–1.5)
BUN SERPL-MCNC: 12 MG/DL (ref 8–22)
CALCIUM SERPL-MCNC: 9 MG/DL (ref 8.5–10.5)
CHLORIDE SERPL-SCNC: 107 MMOL/L (ref 96–112)
CO2 SERPL-SCNC: 22 MMOL/L (ref 20–33)
CORTIS SERPL-MCNC: 5.2 UG/DL (ref 0–23)
CREAT SERPL-MCNC: 0.61 MG/DL (ref 0.5–1.4)
GLOBULIN SER CALC-MCNC: 3.1 G/DL (ref 1.9–3.5)
GLUCOSE SERPL-MCNC: 94 MG/DL (ref 65–99)
POTASSIUM SERPL-SCNC: 3.9 MMOL/L (ref 3.6–5.5)
PROLACTIN SERPL-MCNC: 19.4 NG/ML (ref 2.8–26)
PROT SERPL-MCNC: 7.4 G/DL (ref 6–8.2)
SODIUM SERPL-SCNC: 139 MMOL/L (ref 135–145)
T4 FREE SERPL-MCNC: 1.18 NG/DL (ref 0.93–1.7)
TSH SERPL DL<=0.005 MIU/L-ACNC: 1.99 UIU/ML (ref 0.38–5.33)

## 2022-01-15 PROCEDURE — 84443 ASSAY THYROID STIM HORMONE: CPT

## 2022-01-15 PROCEDURE — 84439 ASSAY OF FREE THYROXINE: CPT

## 2022-01-15 PROCEDURE — 84305 ASSAY OF SOMATOMEDIN: CPT

## 2022-01-15 PROCEDURE — 80053 COMPREHEN METABOLIC PANEL: CPT

## 2022-01-15 PROCEDURE — 84146 ASSAY OF PROLACTIN: CPT

## 2022-01-15 PROCEDURE — 82024 ASSAY OF ACTH: CPT

## 2022-01-15 PROCEDURE — 82533 TOTAL CORTISOL: CPT

## 2022-01-15 PROCEDURE — 36415 COLL VENOUS BLD VENIPUNCTURE: CPT

## 2022-01-19 ENCOUNTER — OFFICE VISIT (OUTPATIENT)
Dept: ENDOCRINOLOGY | Facility: MEDICAL CENTER | Age: 52
End: 2022-01-19
Payer: COMMERCIAL

## 2022-01-19 VITALS
HEART RATE: 82 BPM | DIASTOLIC BLOOD PRESSURE: 70 MMHG | SYSTOLIC BLOOD PRESSURE: 102 MMHG | BODY MASS INDEX: 29.63 KG/M2 | OXYGEN SATURATION: 99 % | WEIGHT: 161 LBS | HEIGHT: 62 IN

## 2022-01-19 DIAGNOSIS — D35.2 PITUITARY ADENOMA (HCC): ICD-10-CM

## 2022-01-19 DIAGNOSIS — E22.1 HYPERPROLACTINEMIA (HCC): ICD-10-CM

## 2022-01-19 LAB
ACTH PLAS-MCNC: 22.3 PG/ML (ref 7.2–63.3)
IGF-I SERPL-MCNC: 89 NG/ML (ref 57–236)
IGF-I Z-SCORE SERPL: -0.9

## 2022-01-19 PROCEDURE — 99211 OFF/OP EST MAY X REQ PHY/QHP: CPT

## 2022-01-19 PROCEDURE — 99214 OFFICE O/P EST MOD 30 MIN: CPT

## 2022-01-19 RX ORDER — CABERGOLINE 0.5 MG/1
0.5 TABLET ORAL
Qty: 36 TABLET | Refills: 3 | Status: SHIPPED | OUTPATIENT
Start: 2022-01-19 | End: 2022-08-19

## 2022-01-19 NOTE — PROGRESS NOTES
Chief Complaint: Consult requested by Colin Engel M.D. for evaluation of Pituitary Mass    Subjective:   1.  Pituitary adenoma:  Amy Ugalde is a 51 y.o. female here for initial evaluation of pituitary tumor.    She was first diagnosed with a pituitary tumor in about 6 years ago.      Presenting symptoms which led to the investigation of the pituitary included galactorrhea.    Initial MRI of the pituitary on about 6 years ago revealed pituitary tummor.      Prior pituitary surgery: No.   Prior pituitary radiation therapy: No.    She denies headache, denies vision difficulties, denies frequent urination, denies increased thirst, denies galactorrhea.    Currently taking 0.5 mg of Cabergoline M-W-Saturday    History:   She was a patient of Dr. Veras  She has a 10 mm pituitary adenoma and hyperprolactinemia  She takes Risperdal, there was a concern that this medication is responsible for elevated prolactin levels and/or the tumor  She is on Carbergoline and the tumor size has not decreased in size substantially    She sees a neurologist Dr. Collazo at [facility],   She understands that next MRI will be in March.    She states that she usually gets a call in February by your neurosurgery group to schedule her MRI.       Ref. Range 1/15/2022 09:10   Cortisol Latest Ref Range: 0.0 - 23.0 ug/dL 5.2   TSH Latest Ref Range: 0.380 - 5.330 uIU/mL 1.990   Free T-4 Latest Ref Range: 0.93 - 1.70 ng/dL 1.18   Acth Latest Ref Range: 7.2 - 63.3 pg/mL 22.3   IGF1 Latest Ref Range: 57 - 236 ng/mL 89   IGF-1 Z Score Calculation Unknown -0.9   Prolactin Latest Ref Range: 2.80 - 26.00 ng/mL 19.40     Last MRI on file was done in March 2020 showed that the pituitary adenoma went from a 10 x 7 mm in size to about 10 x 2 mm in size  She states that she did an MRI last year at [facility] (records requested)     2.  Hyperprolactinemia:  Concerned that this may be due to a prolactinoma versus  Risperdal  Labs levels within range     Ref. Range 1/15/2022 09:10   Prolactin Latest Ref Range: 2.80 - 26.00 ng/mL 19.40       Patient's medications, allergies, and social histories were reviewed and updated as appropriate.    ROS:     CONS:     No fever, no chills, no weight loss, no fatigue   EYES:      No diplopia, no blurry vision, no redness of eyes, no swelling of eyelids   ENT:    No hearing loss, No ear pain, No sore throat, no dysphagia, no neck swelling   CV:     No chest pain, no palpitations, no claudication, no orthopnea, no PND   PULM:    No SOB, no cough, no hemoptysis, no wheezing    GI:   No nausea, no vomiting, no diarrhea, no constipation, no bloody stools   :  Passing urine well, no dysuria, no hematuria   ENDO:   No polyuria, no polydipsia, no heat intolerance, no cold intolerance   NEURO: No headaches, no dizziness, no convulsions, no tremors   MUSC:  No joint swellings, no arthralgias, no myalgias, no weakness   SKIN:   No rash, no ulcers, no dry skin   PSYCH:   No depression, no anxiety, no difficulty sleeping       Past Medical History:  Patient Active Problem List    Diagnosis Date Noted   • Sensation of fullness in both ears 11/01/2021   • Seasonal allergies 10/14/2021   • Skin lesions 04/09/2021   • Pituitary adenoma ([facility]) 01/21/2019   • Pituitary lesion ([facility]) 05/15/2018   • Hyperprolactinemia ([facility]) 05/30/2017   • Amenorrhea 01/06/2016   • Bilateral nipple discharge 01/06/2016   • Other schizophrenia ([facility]) 12/15/2015   • Insomnia 10/30/2013   • Major depression 10/03/2013   • Psychosis ([facility]) 10/03/2013       Past Surgical History:  Past Surgical History:   Procedure Laterality Date   • REPEAT C SECTION W TUBAL LIGATION  6/20/2013    Performed by Marylu Sanchez M.D. at [facility]   • GYN SURGERY      csection   • PELVISCOPY      for fertility   • PRIMARY C SECTION          Allergies:  Patient has no known allergies.     Current Medications:    Current Outpatient  Medications:   •  azelastine (ASTELIN) 137 MCG/SPRAY nasal spray, ADMINISTER 1 SPRAY INTO AFFECTED NOSTRIL(S) 2 TIMES A DAY., Disp: 30 mL, Rfl: 3  •  cabergoline (DOSTINEX) 0.5 MG tablet, TAKE 1 TABLET BY MOUTH EVERY MONDAY, WEDNESDAY, AND FRIDAY FOR 12 DOSES., Disp: 36 Tablet, Rfl: 1  •  traZODone (DESYREL) 100 MG Tab, trazodone 100 mg tablet  Take 1 tablet twice a day by oral route., Disp: , Rfl:   •  risperidone (RISPERDAL) 2 MG Tab, Take 1 Tab by mouth every bedtime., Disp: 30 Tab, Rfl: 2  •  citalopram (CELEXA) 20 MG Tab, Take 1 Tab by mouth every day. TOME JON TABLETA POR VIA ORAL TODOS LOS CORONADO, Disp: 30 Tab, Rfl: 2    Social History:  Social History     Socioeconomic History   • Marital status:      Spouse name: Not on file   • Number of children: Not on file   • Years of education: Not on file   • Highest education level: Not on file   Occupational History   • Not on file   Tobacco Use   • Smoking status: Never Smoker   • Smokeless tobacco: Never Used   Vaping Use   • Vaping Use: Never used   Substance and Sexual Activity   • Alcohol use: No   • Drug use: No   • Sexual activity: Not Currently     Birth control/protection: Surgical     Comment: BTL'   Other Topics Concern   • Not on file   Social History Narrative   • Not on file     Social Determinants of Health     Financial Resource Strain:    • Difficulty of Paying Living Expenses: Not on file   Food Insecurity:    • Worried About Running Out of Food in the Last Year: Not on file   • Ran Out of Food in the Last Year: Not on file   Transportation Needs:    • Lack of Transportation (Medical): Not on file   • Lack of Transportation (Non-Medical): Not on file   Physical Activity:    • Days of Exercise per Week: Not on file   • Minutes of Exercise per Session: Not on file   Stress:    • Feeling of Stress : Not on file   Social Connections:    • Frequency of Communication with Friends and Family: Not on file   • Frequency of Social Gatherings  "with Friends and Family: Not on file   • Attends [employer] Services: Not on file   • Active Member of Clubs or Organizations: Not on file   • Attends Club or Organization Meetings: Not on file   • Marital Status: Not on file   Intimate Partner Violence:    • Fear of Current or Ex-Partner: Not on file   • Emotionally Abused: Not on file   • Physically Abused: Not on file   • Sexually Abused: Not on file   Housing Stability:    • Unable to Pay for Housing in the Last Year: Not on file   • Number of Places Lived in the Last Year: Not on file   • Unstable Housing in the Last Year: Not on file        Family History:   Family History   Problem Relation Age of Onset   • Lung Disease Mother         emphysema   • Kidney stones Mother          PHYSICAL EXAM:   Vital signs: /70 (BP Location: Left arm, Patient Position: Sitting, BP Cuff Size: Adult)   Pulse 82   Ht 1.575 m (5' 2\")   Wt 73 kg (161 lb)   SpO2 99%   BMI 29.45 kg/m²   GENERAL: Well-developed, well-nourished  in no apparent distress.   EYES: no peripheral visual imparements with visual field test  NECK: Supple. Trachea midline. thyroid is normal in size without nodules or tenderness  CARDIOVASCULAR: Regular rate and rhythm. No murmurs, rubs, or gallops.   LUNGS: Clear to auscultation bilaterally   EXTREMITIES: No clubbing, cyanosis, or edema.   NEUROLOGICAL: Cranial nerves II-XII are grossly intact   Symmetric reflexes at the patella no proximal muscle weakness, No visible tremor with both outstretched hands  LYMPH: No cervical, supraclavicular,  adenopathy palpated.   SKIN: No rashes, lesions. Turgor is normal.    Labs:  Lab Results   Component Value Date/Time    WBC 6.5 03/28/2016 11:11 AM    WBC 5.4 06/06/2015 09:52 AM    RBC 4.84 03/28/2016 11:11 AM    RBC 5.00 06/06/2015 09:52 AM    HEMOGLOBIN 14.1 03/28/2016 11:11 AM    HEMOGLOBIN 14.8 06/06/2015 09:52 AM    MCV 86 03/28/2016 11:11 AM    MCV 90.4 06/06/2015 09:52 AM    MCH 29.1 03/28/2016 11:11 AM "    MCH 29.6 06/06/2015 09:52 AM    MCHC 33.8 03/28/2016 11:11 AM    MCHC 32.7 (L) 06/06/2015 09:52 AM    RDW 13.9 03/28/2016 11:11 AM    RDW 41.1 06/06/2015 09:52 AM    MPV 9.7 06/06/2015 09:52 AM       Lab Results   Component Value Date/Time    SODIUM 139 01/15/2022 09:10 AM    POTASSIUM 3.9 01/15/2022 09:10 AM    CHLORIDE 107 01/15/2022 09:10 AM    CO2 22 01/15/2022 09:10 AM    ANION 10.0 01/15/2022 09:10 AM    GLUCOSE 94 01/15/2022 09:10 AM    BUN 12 01/15/2022 09:10 AM    CREATININE 0.61 01/15/2022 09:10 AM    CALCIUM 9.0 01/15/2022 09:10 AM    ASTSGOT 9 (L) 01/15/2022 09:10 AM    ALTSGPT 5 01/15/2022 09:10 AM    TBILIRUBIN 0.4 01/15/2022 09:10 AM    ALBUMIN 4.3 01/15/2022 09:10 AM    TOTPROTEIN 7.4 01/15/2022 09:10 AM    GLOBULIN 3.1 01/15/2022 09:10 AM    AGRATIO 1.4 01/15/2022 09:10 AM       Lab Results   Component Value Date/Time    TSHULTRASEN 1.990 01/15/2022 0910     Lab Results   Component Value Date/Time    FREEDIR 1.01 05/23/2017 0757     Imaging:    Bone Density done on 12/23/2016: Spine T Score: -1.0, Hip T Score: -0.6 with a low risk fracture probability        ASSESSMENT/PLAN:   1. Pituitary adenoma (HCC)  Stable at this time  See HPI    Prescription sent to pharmacy  - cabergoline (DOSTINEX) 0.5 MG tablet; Take 1 Tablet by mouth 3 times a week.  Dispense: 36 Tablet; Refill: 3    I discussed with patient for MRI results to be sent to me when she gets them done in March 2. Hyperprolactinemia (HCC)  See HPI    Disposition: Follow-up in 4 months  2 the following blood work 3 days to 1 week before appointment  - PROLACTIN; Future  - ACTH; Future  - CORTISOL; Future  - TSH; Future  - FREE THYROXINE; Future  - IGF-1 SOMATOMEDIN; Future  - VITAMIN D,25 HYDROXY; Future    Return in about 4 months (around 5/19/2022).    Thank you kindly for allowing me to participate in the diabetes care plan for this patient.    ROSE Corado.

## 2022-01-25 ENCOUNTER — TELEPHONE (OUTPATIENT)
Dept: ENDOCRINOLOGY | Facility: MEDICAL CENTER | Age: 52
End: 2022-01-25

## 2022-01-25 DIAGNOSIS — N91.2 AMENORRHEA: ICD-10-CM

## 2022-01-25 DIAGNOSIS — D35.2 PITUITARY ADENOMA (HCC): ICD-10-CM

## 2022-01-25 NOTE — TELEPHONE ENCOUNTER
----- Message from MAR CoradoPKirkRBEATA sent at 1/19/2022  3:52 PM PST -----  Please request MRI- Brain from [facility] done last year.     Thank you  Dave :)

## 2022-02-09 ENCOUNTER — HOSPITAL ENCOUNTER (OUTPATIENT)
Dept: LAB | Facility: MEDICAL CENTER | Age: 52
End: 2022-02-09
Attending: FAMILY MEDICINE
Payer: COMMERCIAL

## 2022-02-09 DIAGNOSIS — Z00.00 WELL WOMAN EXAM (NO GYNECOLOGICAL EXAM): ICD-10-CM

## 2022-02-09 LAB
ALBUMIN SERPL BCP-MCNC: 4.3 G/DL (ref 3.2–4.9)
ALBUMIN/GLOB SERPL: 1.3 G/DL
ALP SERPL-CCNC: 107 U/L (ref 30–99)
ALT SERPL-CCNC: 7 U/L (ref 2–50)
ANION GAP SERPL CALC-SCNC: 10 MMOL/L (ref 7–16)
AST SERPL-CCNC: 15 U/L (ref 12–45)
BILIRUB SERPL-MCNC: 0.3 MG/DL (ref 0.1–1.5)
BUN SERPL-MCNC: 7 MG/DL (ref 8–22)
CALCIUM SERPL-MCNC: 9.3 MG/DL (ref 8.5–10.5)
CHLORIDE SERPL-SCNC: 104 MMOL/L (ref 96–112)
CHOLEST SERPL-MCNC: 194 MG/DL (ref 100–199)
CO2 SERPL-SCNC: 25 MMOL/L (ref 20–33)
CREAT SERPL-MCNC: 0.66 MG/DL (ref 0.5–1.4)
ERYTHROCYTE [DISTWIDTH] IN BLOOD BY AUTOMATED COUNT: 42.4 FL (ref 35.9–50)
EST. AVERAGE GLUCOSE BLD GHB EST-MCNC: 120 MG/DL
FASTING STATUS PATIENT QL REPORTED: NORMAL
GLOBULIN SER CALC-MCNC: 3.3 G/DL (ref 1.9–3.5)
GLUCOSE SERPL-MCNC: 102 MG/DL (ref 65–99)
HBA1C MFR BLD: 5.8 % (ref 4–5.6)
HCT VFR BLD AUTO: 43 % (ref 37–47)
HDLC SERPL-MCNC: 32 MG/DL
HGB BLD-MCNC: 14 G/DL (ref 12–16)
LDLC SERPL CALC-MCNC: 124 MG/DL
MCH RBC QN AUTO: 29.6 PG (ref 27–33)
MCHC RBC AUTO-ENTMCNC: 32.6 G/DL (ref 33.6–35)
MCV RBC AUTO: 90.9 FL (ref 81.4–97.8)
PLATELET # BLD AUTO: 271 K/UL (ref 164–446)
PMV BLD AUTO: 9.4 FL (ref 9–12.9)
POTASSIUM SERPL-SCNC: 4.3 MMOL/L (ref 3.6–5.5)
PROT SERPL-MCNC: 7.6 G/DL (ref 6–8.2)
RBC # BLD AUTO: 4.73 M/UL (ref 4.2–5.4)
SODIUM SERPL-SCNC: 139 MMOL/L (ref 135–145)
TRIGL SERPL-MCNC: 190 MG/DL (ref 0–149)
WBC # BLD AUTO: 5.3 K/UL (ref 4.8–10.8)

## 2022-02-09 PROCEDURE — 83036 HEMOGLOBIN GLYCOSYLATED A1C: CPT

## 2022-02-09 PROCEDURE — 85027 COMPLETE CBC AUTOMATED: CPT

## 2022-02-09 PROCEDURE — 36415 COLL VENOUS BLD VENIPUNCTURE: CPT

## 2022-02-09 PROCEDURE — 80061 LIPID PANEL: CPT

## 2022-02-09 PROCEDURE — 80053 COMPREHEN METABOLIC PANEL: CPT

## 2022-02-15 ENCOUNTER — OFFICE VISIT (OUTPATIENT)
Dept: MEDICAL GROUP | Facility: PHYSICIAN GROUP | Age: 52
End: 2022-02-15
Payer: COMMERCIAL

## 2022-02-15 VITALS
HEIGHT: 62 IN | TEMPERATURE: 98.1 F | SYSTOLIC BLOOD PRESSURE: 98 MMHG | OXYGEN SATURATION: 95 % | BODY MASS INDEX: 29.96 KG/M2 | WEIGHT: 162.8 LBS | HEART RATE: 70 BPM | DIASTOLIC BLOOD PRESSURE: 60 MMHG

## 2022-02-15 DIAGNOSIS — E78.2 MIXED HYPERLIPIDEMIA: ICD-10-CM

## 2022-02-15 DIAGNOSIS — D35.2 PITUITARY ADENOMA (HCC): ICD-10-CM

## 2022-02-15 DIAGNOSIS — R73.03 PREDIABETES: ICD-10-CM

## 2022-02-15 PROCEDURE — 99214 OFFICE O/P EST MOD 30 MIN: CPT | Performed by: FAMILY MEDICINE

## 2022-02-15 ASSESSMENT — FIBROSIS 4 INDEX: FIB4 SCORE: 1.05

## 2022-02-16 NOTE — PROGRESS NOTES
"Subjective:     Chief Complaint   Patient presents with   • Results   • Referral Needed     Hermone specialist preffered Grecia Pham       HPI:   Amy presents today to discuss the following.    Prediabetes  Chronic issue  At 5.8% 2/22  Needs to work on lifestyle     Pituitary adenoma (HCC)  Chronic issue  Needs new referral to endocrinology. Dr Veras retired.     Mixed hyperlipidemia  Chronic issue  The 10-year ASCVD risk score (Marisalexy LOPEZ Jr., et al., 2013) is: 1.4%        Past Medical History:   Diagnosis Date   • Depression    • Hyperprolactinemia (HCC)     probably secondary to medication   • Mixed hyperlipidemia 2/15/2022   • Pituitary lesion (HCC)     probably nonfunctional cyst / pituitary function intact   • Schizophrenia (HCC)        Current Outpatient Medications Ordered in Epic   Medication Sig Dispense Refill   • cabergoline (DOSTINEX) 0.5 MG tablet Take 1 Tablet by mouth 3 times a week. 36 Tablet 3   • azelastine (ASTELIN) 137 MCG/SPRAY nasal spray ADMINISTER 1 SPRAY INTO AFFECTED NOSTRIL(S) 2 TIMES A DAY. 30 mL 3   • traZODone (DESYREL) 100 MG Tab trazodone 100 mg tablet   Take 1 tablet twice a day by oral route.     • risperidone (RISPERDAL) 2 MG Tab Take 1 Tab by mouth every bedtime. 30 Tab 2   • citalopram (CELEXA) 20 MG Tab Take 1 Tab by mouth every day. TOME JON TABLETA POR VIA ORAL TODOS LOS CORNOADO 30 Tab 2     No current Epic-ordered facility-administered medications on file.       Allergies:  Patient has no known allergies.    Health Maintenance: Completed    ROS:  Gen: no fevers/chills, no changes in weight  Eyes: no changes in vision  Pulm: no sob, no cough  CV: no chest pain, no palpitations  GI: no nausea/vomiting, no diarrhea      Objective:     Exam:  BP (!) 98/60 (BP Location: Left arm, Patient Position: Sitting, BP Cuff Size: Adult)   Pulse 70   Temp 36.7 °C (98.1 °F) (Temporal)   Ht 1.575 m (5' 2\")   Wt 73.8 kg (162 lb 12.8 oz)   SpO2 95%   BMI 29.78 kg/m²  Body mass index " is 29.78 kg/m².          Constitutional: Alert, no distress, well-groomed.  Skin: Warm, dry, good turgor, no rashes in visible areas.  Eye: Equal, round and reactive, conjunctiva clear, lids normal.  ENMT: Lips without lesions, good dentition, moist mucous membranes.  Neck: Trachea midline, no masses, no thyromegaly.  Respiratory: Unlabored respiratory effort, no cough.  MSK: Normal gait, moves all extremities.  Neuro: Grossly non-focal.   Psych: Alert and oriented x3, normal affect and mood.        Assessment & Plan:     51 y.o. female with the following -     1. Prediabetes  Chronic condition.  Most recent A1c of 5.8%.  I recommend reinforcing lifestyle change.    2. Pituitary adenoma (HCC)  Chronic, stable condition.  New referral to endocrinology recommended today.  - Referral to Endocrinology    3. Mixed hyperlipidemia  Chronic, worsening condition.  Recommend lifestyle change as above.      Return in about 3 months (around 5/15/2022).    Please note that this dictation was created using voice recognition software. I have made every reasonable attempt to correct obvious errors, but I expect that there are errors of grammar and possibly content that I did not discover before finalizing the note.

## 2022-02-28 ENCOUNTER — HOSPITAL ENCOUNTER (OUTPATIENT)
Dept: RADIOLOGY | Facility: MEDICAL CENTER | Age: 52
End: 2022-02-28
Attending: FAMILY MEDICINE
Payer: COMMERCIAL

## 2022-02-28 DIAGNOSIS — Z12.31 VISIT FOR SCREENING MAMMOGRAM: ICD-10-CM

## 2022-02-28 PROCEDURE — 77063 BREAST TOMOSYNTHESIS BI: CPT

## 2022-04-14 ENCOUNTER — OFFICE VISIT (OUTPATIENT)
Dept: MEDICAL GROUP | Facility: PHYSICIAN GROUP | Age: 52
End: 2022-04-14
Payer: COMMERCIAL

## 2022-04-14 VITALS
WEIGHT: 163.4 LBS | HEART RATE: 81 BPM | SYSTOLIC BLOOD PRESSURE: 100 MMHG | TEMPERATURE: 97.8 F | DIASTOLIC BLOOD PRESSURE: 50 MMHG | BODY MASS INDEX: 30.07 KG/M2 | HEIGHT: 62 IN | RESPIRATION RATE: 16 BRPM | OXYGEN SATURATION: 95 %

## 2022-04-14 DIAGNOSIS — L60.0 INGROWN TOENAIL: ICD-10-CM

## 2022-04-14 PROCEDURE — 99214 OFFICE O/P EST MOD 30 MIN: CPT | Performed by: FAMILY MEDICINE

## 2022-04-14 RX ORDER — CEPHALEXIN 500 MG/1
500 CAPSULE ORAL 4 TIMES DAILY
Qty: 28 CAPSULE | Refills: 0 | Status: SHIPPED | OUTPATIENT
Start: 2022-04-14 | End: 2022-04-21

## 2022-04-14 ASSESSMENT — FIBROSIS 4 INDEX: FIB4 SCORE: 1.07

## 2022-04-14 NOTE — PROGRESS NOTES
"Subjective:     Chief Complaint   Patient presents with   • Ingrown Toenail     Left, 3 weeks        HPI:   Amy presents today to discuss the following.      Ingrown toenail  New problem  Right great toenail ingrown. inflamed but has not noted any pus.   Mildly erythematous. Has tried to trim the nail appropriately.   Has hx of this to both great toes.       Past Medical History:   Diagnosis Date   • Depression    • Hyperprolactinemia (HCC)     probably secondary to medication   • Mixed hyperlipidemia 2/15/2022   • Pituitary lesion (HCC)     probably nonfunctional cyst / pituitary function intact   • Schizophrenia (HCC)        Current Outpatient Medications Ordered in Epic   Medication Sig Dispense Refill   • cephALEXin (KEFLEX) 500 MG Cap Take 1 Capsule by mouth 4 times a day for 7 days. 28 Capsule 0   • cabergoline (DOSTINEX) 0.5 MG tablet Take 1 Tablet by mouth 3 times a week. 36 Tablet 3   • azelastine (ASTELIN) 137 MCG/SPRAY nasal spray ADMINISTER 1 SPRAY INTO AFFECTED NOSTRIL(S) 2 TIMES A DAY. 30 mL 3   • traZODone (DESYREL) 100 MG Tab trazodone 100 mg tablet   Take 1 tablet twice a day by oral route.     • risperidone (RISPERDAL) 2 MG Tab Take 1 Tab by mouth every bedtime. 30 Tab 2   • citalopram (CELEXA) 20 MG Tab Take 1 Tab by mouth every day. TOME JON TABLETA POR VIA ORAL TODOS LOS CORONADO 30 Tab 2     No current Epic-ordered facility-administered medications on file.       Allergies:  Patient has no known allergies.    Health Maintenance: Completed    ROS:  Gen: no fevers/chills, no changes in weight  Eyes: no changes in vision  Pulm: no sob, no cough  CV: no chest pain, no palpitations  GI: no nausea/vomiting, no diarrhea      Objective:     Exam:  /50 (BP Location: Left arm, Patient Position: Sitting, BP Cuff Size: Adult)   Pulse 81   Temp 36.6 °C (97.8 °F) (Temporal)   Resp 16   Ht 1.575 m (5' 2\")   Wt 74.1 kg (163 lb 6.4 oz)   SpO2 95%   BMI 29.89 kg/m²  Body mass index is 29.89 " kg/m².      Constitutional: Alert, no distress, well-groomed.  Skin: Warm, dry, good turgor, no rashes in visible areas.  Eye: Equal, round and reactive, conjunctiva clear, lids normal.  ENMT: Lips without lesions, good dentition, moist mucous membranes.  Neck: Trachea midline, no masses, no thyromegaly.  Respiratory: Unlabored respiratory effort, no cough.  MSK: Normal gait, moves all extremities.  Inspection of the right great toe shows evidence of inflammation on the medial aspect of the nail border with mild inflammation.  No active purulent drainage or scattered erythema.  Tender to palpation.  Neuro: Grossly non-focal.   Psych: Alert and oriented x3, normal affect and mood.        Assessment & Plan:     52 y.o. female with the following -     1. Ingrown toenail  Chronic, recurrent condition.  Presents today with recurrent ingrown toenail to the right great toe over the past 3 weeks.  No active purulent drainage at this time but it appears erythematous and swollen.  I will treat with Keflex and referred to podiatry for nail trimming and further evaluation.  - cephALEXin (KEFLEX) 500 MG Cap; Take 1 Capsule by mouth 4 times a day for 7 days.  Dispense: 28 Capsule; Refill: 0  - Referral to Podiatry      No follow-ups on file.    Please note that this dictation was created using voice recognition software. I have made every reasonable attempt to correct obvious errors, but I expect that there are errors of grammar and possibly content that I did not discover before finalizing the note.

## 2022-04-14 NOTE — ASSESSMENT & PLAN NOTE
New problem  Right great toenail ingrown. inflamed but has not noted any pus.   Mildly erythematous. Has tried to trim the nail appropriately.   Has hx of this to both great toes.

## 2022-05-12 DIAGNOSIS — R73.03 PREDIABETES: ICD-10-CM

## 2022-05-14 ENCOUNTER — HOSPITAL ENCOUNTER (OUTPATIENT)
Dept: LAB | Facility: MEDICAL CENTER | Age: 52
End: 2022-05-14
Payer: COMMERCIAL

## 2022-05-14 ENCOUNTER — HOSPITAL ENCOUNTER (OUTPATIENT)
Dept: LAB | Facility: MEDICAL CENTER | Age: 52
End: 2022-05-14
Attending: FAMILY MEDICINE
Payer: COMMERCIAL

## 2022-05-14 DIAGNOSIS — D35.2 PITUITARY ADENOMA (HCC): ICD-10-CM

## 2022-05-14 DIAGNOSIS — E22.1 HYPERPROLACTINEMIA (HCC): ICD-10-CM

## 2022-05-14 DIAGNOSIS — R73.03 PREDIABETES: ICD-10-CM

## 2022-05-14 LAB
CORTIS SERPL-MCNC: 10.5 UG/DL (ref 0–23)
EST. AVERAGE GLUCOSE BLD GHB EST-MCNC: 114 MG/DL
HBA1C MFR BLD: 5.6 % (ref 4–5.6)
PROLACTIN SERPL-MCNC: 59.4 NG/ML (ref 2.8–26)
T4 FREE SERPL-MCNC: 1.15 NG/DL (ref 0.93–1.7)
TSH SERPL DL<=0.005 MIU/L-ACNC: 1.9 UIU/ML (ref 0.38–5.33)

## 2022-05-14 PROCEDURE — 84305 ASSAY OF SOMATOMEDIN: CPT

## 2022-05-14 PROCEDURE — 84146 ASSAY OF PROLACTIN: CPT

## 2022-05-14 PROCEDURE — 82306 VITAMIN D 25 HYDROXY: CPT

## 2022-05-14 PROCEDURE — 82533 TOTAL CORTISOL: CPT

## 2022-05-14 PROCEDURE — 36415 COLL VENOUS BLD VENIPUNCTURE: CPT

## 2022-05-14 PROCEDURE — 82024 ASSAY OF ACTH: CPT

## 2022-05-14 PROCEDURE — 83036 HEMOGLOBIN GLYCOSYLATED A1C: CPT

## 2022-05-14 PROCEDURE — 84443 ASSAY THYROID STIM HORMONE: CPT

## 2022-05-14 PROCEDURE — 84439 ASSAY OF FREE THYROXINE: CPT

## 2022-05-16 ENCOUNTER — OFFICE VISIT (OUTPATIENT)
Dept: MEDICAL GROUP | Facility: PHYSICIAN GROUP | Age: 52
End: 2022-05-16
Payer: COMMERCIAL

## 2022-05-16 VITALS
HEART RATE: 68 BPM | HEIGHT: 62 IN | RESPIRATION RATE: 18 BRPM | TEMPERATURE: 97.5 F | DIASTOLIC BLOOD PRESSURE: 60 MMHG | SYSTOLIC BLOOD PRESSURE: 102 MMHG | OXYGEN SATURATION: 97 % | BODY MASS INDEX: 29.81 KG/M2 | WEIGHT: 162 LBS

## 2022-05-16 DIAGNOSIS — R73.03 PREDIABETES: ICD-10-CM

## 2022-05-16 DIAGNOSIS — L60.0 INGROWN TOENAIL: ICD-10-CM

## 2022-05-16 DIAGNOSIS — E78.2 MIXED HYPERLIPIDEMIA: ICD-10-CM

## 2022-05-16 PROCEDURE — 99214 OFFICE O/P EST MOD 30 MIN: CPT | Performed by: FAMILY MEDICINE

## 2022-05-16 RX ORDER — CEPHALEXIN 500 MG/1
500 CAPSULE ORAL 4 TIMES DAILY
Qty: 28 CAPSULE | Refills: 0 | Status: SHIPPED | OUTPATIENT
Start: 2022-05-16 | End: 2022-05-23

## 2022-05-16 ASSESSMENT — FIBROSIS 4 INDEX: FIB4 SCORE: 1.07

## 2022-05-16 NOTE — ASSESSMENT & PLAN NOTE
Chronic issue  Persistent right great toe nail ingrown. Following up with podiatry. Starting to get new paronychia.

## 2022-05-16 NOTE — PROGRESS NOTES
Subjective:     Chief Complaint   Patient presents with   • Follow-Up       HPI:   Amy presents today to discuss the following.    Ingrown toenail  Chronic issue  Persistent right great toe nail ingrown. Following up with podiatry. Starting to get new paronychia.     Prediabetes  Chronic issue  Last A1c at 5.6% this month  Enforcing lifestyle change     Mixed hyperlipidemia  Chronic issue  The 10-year ASCVD risk score (Marisa LOPEZ Jr., et al., 2013) is: 1.6%        Past Medical History:   Diagnosis Date   • Depression    • Hyperprolactinemia (HCC)     probably secondary to medication   • Mixed hyperlipidemia 2/15/2022   • Pituitary lesion (HCC)     probably nonfunctional cyst / pituitary function intact   • Schizophrenia (HCC)        Current Outpatient Medications Ordered in Epic   Medication Sig Dispense Refill   • cephALEXin (KEFLEX) 500 MG Cap Take 1 Capsule by mouth in the morning and 1 Capsule at noon and 1 Capsule in the evening and 1 Capsule before bedtime. Do all this for 7 days. 28 Capsule 0   • cabergoline (DOSTINEX) 0.5 MG tablet Take 1 Tablet by mouth 3 times a week. 36 Tablet 3   • azelastine (ASTELIN) 137 MCG/SPRAY nasal spray ADMINISTER 1 SPRAY INTO AFFECTED NOSTRIL(S) 2 TIMES A DAY. 30 mL 3   • traZODone (DESYREL) 100 MG Tab trazodone 100 mg tablet   Take 1 tablet twice a day by oral route.     • risperidone (RISPERDAL) 2 MG Tab Take 1 Tab by mouth every bedtime. 30 Tab 2   • citalopram (CELEXA) 20 MG Tab Take 1 Tab by mouth every day. TOME JON TABLETA POR VIA ORAL TODOS LOS CORONADO 30 Tab 2     No current Epic-ordered facility-administered medications on file.       Allergies:  Patient has no known allergies.    Health Maintenance: Completed    ROS:  Gen: no fevers/chills, no changes in weight  Eyes: no changes in vision  Pulm: no sob, no cough  CV: no chest pain, no palpitations  GI: no nausea/vomiting, no diarrhea      Objective:     Exam:  /60 (BP Location: Left arm, Patient Position:  "Sitting, BP Cuff Size: Adult)   Pulse 68   Temp 36.4 °C (97.5 °F) (Temporal)   Resp 18   Ht 1.575 m (5' 2\")   Wt 73.5 kg (162 lb)   SpO2 97%   BMI 29.63 kg/m²  Body mass index is 29.63 kg/m².        Constitutional: Alert, no distress, well-groomed.  Skin: Warm, dry, good turgor, no rashes in visible areas.  Eye: Equal, round and reactive, conjunctiva clear, lids normal.  ENMT: Lips without lesions, good dentition, moist mucous membranes.  Neck: Trachea midline, no masses, no thyromegaly.  Respiratory: Unlabored respiratory effort, no cough.  MSK: Normal gait, moves all extremities.  Inspection of the right great toe shows evidence of paronychia.  Neuro: Grossly non-focal.   Psych: Alert and oriented x3, normal affect and mood.        Assessment & Plan:     52 y.o. female with the following -     1. Ingrown toenail  Chronic, recurrent condition.  Developing new infection at this time.  I strongly recommend she continues to follow-up podiatry to address this problem.  For now I will treat with Keflex.  - cephALEXin (KEFLEX) 500 MG Cap; Take 1 Capsule by mouth in the morning and 1 Capsule at noon and 1 Capsule in the evening and 1 Capsule before bedtime. Do all this for 7 days.  Dispense: 28 Capsule; Refill: 0    2. Prediabetes  Chronic, stable condition.  Continue with lifestyle change.    3. Mixed hyperlipidemia  Chronic, uncontrolled condition.  Continue with lifestyle change.      Return in about 6 months (around 11/16/2022).    Please note that this dictation was created using voice recognition software. I have made every reasonable attempt to correct obvious errors, but I expect that there are errors of grammar and possibly content that I did not discover before finalizing the note.      "

## 2022-05-17 LAB
ACTH PLAS-MCNC: 15.5 PG/ML (ref 7.2–63.3)
IGF-I SERPL-MCNC: 76 NG/ML (ref 55–235)
IGF-I Z-SCORE SERPL: -1.3

## 2022-05-18 LAB — 25(OH)D3 SERPL-MCNC: 21 NG/ML (ref 30–100)

## 2022-05-20 ENCOUNTER — OFFICE VISIT (OUTPATIENT)
Dept: ENDOCRINOLOGY | Facility: MEDICAL CENTER | Age: 52
End: 2022-05-20
Payer: COMMERCIAL

## 2022-05-20 VITALS
DIASTOLIC BLOOD PRESSURE: 72 MMHG | HEIGHT: 62 IN | WEIGHT: 164 LBS | HEART RATE: 89 BPM | SYSTOLIC BLOOD PRESSURE: 108 MMHG | BODY MASS INDEX: 30.18 KG/M2 | OXYGEN SATURATION: 98 %

## 2022-05-20 DIAGNOSIS — E55.9 VITAMIN D DEFICIENCY: ICD-10-CM

## 2022-05-20 DIAGNOSIS — D35.2 PITUITARY ADENOMA (HCC): ICD-10-CM

## 2022-05-20 DIAGNOSIS — E22.1 HYPERPROLACTINEMIA (HCC): ICD-10-CM

## 2022-05-20 PROCEDURE — 99214 OFFICE O/P EST MOD 30 MIN: CPT

## 2022-05-20 PROCEDURE — 99211 OFF/OP EST MAY X REQ PHY/QHP: CPT

## 2022-05-20 RX ORDER — CABERGOLINE 0.5 MG/1
1 TABLET ORAL
Qty: 24 TABLET | Refills: 3 | Status: SHIPPED | OUTPATIENT
Start: 2022-05-20 | End: 2022-08-19

## 2022-05-20 ASSESSMENT — FIBROSIS 4 INDEX: FIB4 SCORE: 1.07

## 2022-05-20 NOTE — PROGRESS NOTES
Chief Complaint: Follow-up on the following conditions  Subjective:   1.  Pituitary adenoma:  Amy Ugalde is a 51 y.o. female     She was first diagnosed with a pituitary tumor in about 6 years ago.      Presenting symptoms which led to the investigation of the pituitary included galactorrhea.    Initial MRI of the pituitary on about 6 years ago revealed pituitary tummor.      She denies headache, denies vision difficulties, denies frequent urination, denies increased thirst, denies galactorrhea.  She reports breast tenderness    Currently taking 0.5 mg of Cabergoline M-W-Saturday    History:   She was a patient of Dr. Veras  She has a 10 mm pituitary adenoma and hyperprolactinemia  She takes Risperdal, there was a concern that this medication is responsible for elevated prolactin levels and/or the tumor  She is on Carbergoline and the tumor size has not decreased in size substantially    She sees a neurologist Dr. Collazo at [facility],   MRI on  March 2020 showed that the a Lactotroph-pituitary adenoma that went from a 10 x 7 mm in size to about 10 x 2 mm in size  MRI on 3/28/2022 showed a stable 0.3 microadenoma      Elevated prolactin levels, with reports of breast tenderness   Latest Reference Range & Units 05/14/22 09:03   Prolactin 2.80 - 26.00 ng/mL 59.40 (H)      Latest Reference Range & Units 05/14/22 09:03   IGF1 55 - 235 ng/mL 76      Latest Reference Range & Units 05/14/22 09:03   TSH 0.380 - 5.330 uIU/mL 1.900 [1]   Free T-4 0.93 - 1.70 ng/dL 1.15      Latest Reference Range & Units 05/14/22 09:03   Cortisol 0.0 - 23.0 ug/dL 10.5 [1]     2.  Hyperprolactinemia:  Concerned that this may be due to a prolactinoma versus Risperdal     Latest Reference Range & Units 05/14/22 09:03   Prolactin 2.80 - 26.00 ng/mL 59.40 (H)     3.Vitamin D deficiency:  Low vitamin D levels   Latest Reference Range & Units 05/14/22 09:03   25-Hydroxy   Vitamin D 25 30 - 100 ng/mL 21 (L) [1]        Patient's medications, allergies, and social histories were reviewed and updated as appropriate.    ROS:     CONS:     No fever, no chills, no weight loss, no fatigue   EYES:      No diplopia, no blurry vision, no redness of eyes, no swelling of eyelids   ENT:    No hearing loss, No ear pain, No sore throat, no dysphagia, no neck swelling   CV:     No chest pain, no palpitations, no claudication, no orthopnea, no PND   PULM:    No SOB, no cough, no hemoptysis, no wheezing    GI:   No nausea, no vomiting, no diarrhea, no constipation, no bloody stools   :  Passing urine well, no dysuria, no hematuria   ENDO:   No polyuria, no polydipsia, no heat intolerance, no cold intolerance   NEURO: No headaches, no dizziness, no convulsions, no tremors   MUSC:  No joint swellings, no arthralgias, no myalgias, no weakness   SKIN:   No rash, no ulcers, no dry skin   PSYCH:   No depression, no anxiety, no difficulty sleeping       Past Medical History:  Patient Active Problem List    Diagnosis Date Noted   • Ingrown toenail 04/14/2022   • Prediabetes 02/15/2022   • Mixed hyperlipidemia 02/15/2022   • Sensation of fullness in both ears 11/01/2021   • Seasonal allergies 10/14/2021   • Skin lesions 04/09/2021   • Pituitary adenoma ([facility]) 01/21/2019   • Pituitary lesion ([facility]) 05/15/2018   • Hyperprolactinemia ([facility]) 05/30/2017   • Amenorrhea 01/06/2016   • Bilateral nipple discharge 01/06/2016   • Other schizophrenia ([facility]) 12/15/2015   • Insomnia 10/30/2013   • Major depression 10/03/2013   • Psychosis ([facility]) 10/03/2013       Past Surgical History:  Past Surgical History:   Procedure Laterality Date   • REPEAT C SECTION W TUBAL LIGATION  6/20/2013    Performed by Marylu Sanchez M.D. at LABOR [facility] DELIVERY   • GYN SURGERY      csection   • PELVISCOPY      for fertility   • PRIMARY C SECTION          Allergies:  Patient has no known allergies.     Current Medications:    Current Outpatient Medications:   •  cephALEXin (KEFLEX)  500 MG Cap, Take 1 Capsule by mouth in the morning and 1 Capsule at noon and 1 Capsule in the evening and 1 Capsule before bedtime. Do all this for 7 days., Disp: 28 Capsule, Rfl: 0  •  cabergoline (DOSTINEX) 0.5 MG tablet, Take 1 Tablet by mouth 3 times a week., Disp: 36 Tablet, Rfl: 3  •  azelastine (ASTELIN) 137 MCG/SPRAY nasal spray, ADMINISTER 1 SPRAY INTO AFFECTED NOSTRIL(S) 2 TIMES A DAY., Disp: 30 mL, Rfl: 3  •  traZODone (DESYREL) 100 MG Tab, trazodone 100 mg tablet  Take 1 tablet twice a day by oral route., Disp: , Rfl:   •  risperidone (RISPERDAL) 2 MG Tab, Take 1 Tab by mouth every bedtime., Disp: 30 Tab, Rfl: 2  •  citalopram (CELEXA) 20 MG Tab, Take 1 Tab by mouth every day. TOME JON TABLETA POR VIA ORAL TODOS LOS CORONADO, Disp: 30 Tab, Rfl: 2    Social History:  Social History     Socioeconomic History   • Marital status:      Spouse name: Not on file   • Number of children: Not on file   • Years of education: Not on file   • Highest education level: Not on file   Occupational History   • Not on file   Tobacco Use   • Smoking status: Never Smoker   • Smokeless tobacco: Never Used   Vaping Use   • Vaping Use: Never used   Substance and Sexual Activity   • Alcohol use: No   • Drug use: No   • Sexual activity: Not Currently     Birth control/protection: Surgical     Comment: BTL'   Other Topics Concern   • Not on file   Social History Narrative   • Not on file     Social Determinants of Health     Financial Resource Strain: Not on file   Food Insecurity: Not on file   Transportation Needs: Not on file   Physical Activity: Not on file   Stress: Not on file   Social Connections: Not on file   Intimate Partner Violence: Not on file   Housing Stability: Not on file        Family History:   Family History   Problem Relation Age of Onset   • Lung Disease Mother         emphysema   • Kidney stones Mother          PHYSICAL EXAM:   Vital signs: /72 (BP Location: Left arm, Patient Position:  "Sitting, BP Cuff Size: Adult)   Pulse 89   Ht 1.575 m (5' 2\")   Wt 74.4 kg (164 lb)   SpO2 98%   BMI 30.00 kg/m²   GENERAL: Well-developed, well-nourished  in no apparent distress.   EYES: no peripheral visual imparements with visual field test  NECK: Supple. Trachea midline. thyroid is normal in size without nodules or tenderness  CARDIOVASCULAR: Regular rate and rhythm. No murmurs, rubs, or gallops.   LUNGS: Clear to auscultation bilaterally   EXTREMITIES: No clubbing, cyanosis, or edema.   NEUROLOGICAL: Cranial nerves II-XII are grossly intact   Symmetric reflexes at the patella no proximal muscle weakness, No visible tremor with both outstretched hands  LYMPH: No cervical, supraclavicular,  adenopathy palpated.   SKIN: No rashes, lesions. Turgor is normal.    Labs:  Lab Results   Component Value Date/Time    WBC 5.3 02/09/2022 06:45 AM    RBC 4.73 02/09/2022 06:45 AM    HEMOGLOBIN 14.0 02/09/2022 06:45 AM    MCV 90.9 02/09/2022 06:45 AM    MCH 29.6 02/09/2022 06:45 AM    MCHC 32.6 (L) 02/09/2022 06:45 AM    RDW 42.4 02/09/2022 06:45 AM    MPV 9.4 02/09/2022 06:45 AM       Lab Results   Component Value Date/Time    SODIUM 139 02/09/2022 06:45 AM    POTASSIUM 4.3 02/09/2022 06:45 AM    CHLORIDE 104 02/09/2022 06:45 AM    CO2 25 02/09/2022 06:45 AM    ANION 10.0 02/09/2022 06:45 AM    GLUCOSE 102 (H) 02/09/2022 06:45 AM    BUN 7 (L) 02/09/2022 06:45 AM    CREATININE 0.66 02/09/2022 06:45 AM    CALCIUM 9.3 02/09/2022 06:45 AM    ASTSGOT 15 02/09/2022 06:45 AM    ALTSGPT 7 02/09/2022 06:45 AM    TBILIRUBIN 0.3 02/09/2022 06:45 AM    ALBUMIN 4.3 02/09/2022 06:45 AM    TOTPROTEIN 7.6 02/09/2022 06:45 AM    GLOBULIN 3.3 02/09/2022 06:45 AM    AGRATIO 1.3 02/09/2022 06:45 AM       Lab Results   Component Value Date/Time    TSHULTRASEN 1.990 01/15/2022 0910     Lab Results   Component Value Date/Time    FREEDIR 1.01 05/23/2017 0757     Imaging:    Bone Density done on 12/23/2016: Spine T Score: -1.0, Hip T Score: " -0.6 with a low risk fracture probability        ASSESSMENT/PLAN:   1. Pituitary adenoma (HCC)  Unstable  We will increase cabergoline from 0.5 mg 3 times a week to 1 mg 2 times a week    Prescription sent to pharmacy  - cabergoline (DOSTINEX) 0.5 MG tablet; Take 2 Tablets by mouth two times a week.  Dispense: 24 Tablet; Refill: 3    Please do the following blood work before your next appointment in 3 months  - PROLACTIN; Future  - TSH; Future  - FREE THYROXINE; Future  - IGF-1 SOMATOMEDIN; Future  - CORTISOL; Future  - ACTH; Future    2. Hyperprolactinemia (HCC)  Unstable  See diagnoses #1    3. Vitamin D deficiency  Unstable  Discussed to start taking vitamin D3 5000 IUs OTC daily      Disposition: Make an appointment to follow-up in 3 months  Review blood work before your next appointment      Thank you kindly for allowing me to participate in the diabetes care plan for this patient.    ROSE Coardo.

## 2022-05-27 ENCOUNTER — PHARMACY VISIT (OUTPATIENT)
Dept: PHARMACY | Facility: MEDICAL CENTER | Age: 52
End: 2022-05-27
Payer: COMMERCIAL

## 2022-05-27 PROCEDURE — RXMED WILLOW AMBULATORY MEDICATION CHARGE: Performed by: INTERNAL MEDICINE

## 2022-08-13 ENCOUNTER — HOSPITAL ENCOUNTER (OUTPATIENT)
Dept: LAB | Facility: MEDICAL CENTER | Age: 52
End: 2022-08-13
Payer: COMMERCIAL

## 2022-08-13 DIAGNOSIS — E22.1 HYPERPROLACTINEMIA (HCC): ICD-10-CM

## 2022-08-13 DIAGNOSIS — D35.2 PITUITARY ADENOMA (HCC): ICD-10-CM

## 2022-08-13 LAB
CORTIS SERPL-MCNC: 14.1 UG/DL (ref 0–23)
PROLACTIN SERPL-MCNC: 11.2 NG/ML (ref 2.8–26)
T4 FREE SERPL-MCNC: 1.24 NG/DL (ref 0.93–1.7)
TSH SERPL DL<=0.005 MIU/L-ACNC: 2.19 UIU/ML (ref 0.38–5.33)

## 2022-08-13 PROCEDURE — 36415 COLL VENOUS BLD VENIPUNCTURE: CPT

## 2022-08-13 PROCEDURE — 84305 ASSAY OF SOMATOMEDIN: CPT

## 2022-08-13 PROCEDURE — 82024 ASSAY OF ACTH: CPT

## 2022-08-13 PROCEDURE — 84146 ASSAY OF PROLACTIN: CPT

## 2022-08-13 PROCEDURE — 84443 ASSAY THYROID STIM HORMONE: CPT

## 2022-08-13 PROCEDURE — 82533 TOTAL CORTISOL: CPT

## 2022-08-13 PROCEDURE — 84439 ASSAY OF FREE THYROXINE: CPT

## 2022-08-16 LAB
ACTH PLAS-MCNC: 28.4 PG/ML (ref 7.2–63.3)
IGF-I SERPL-MCNC: 103 NG/ML (ref 55–235)
IGF-I Z-SCORE SERPL: -0.5

## 2022-08-19 ENCOUNTER — OFFICE VISIT (OUTPATIENT)
Dept: ENDOCRINOLOGY | Facility: MEDICAL CENTER | Age: 52
End: 2022-08-19
Payer: COMMERCIAL

## 2022-08-19 VITALS
OXYGEN SATURATION: 100 % | WEIGHT: 158 LBS | DIASTOLIC BLOOD PRESSURE: 62 MMHG | SYSTOLIC BLOOD PRESSURE: 120 MMHG | BODY MASS INDEX: 29.08 KG/M2 | HEART RATE: 88 BPM | HEIGHT: 62 IN

## 2022-08-19 DIAGNOSIS — D35.2 PITUITARY ADENOMA (HCC): ICD-10-CM

## 2022-08-19 DIAGNOSIS — E22.1 HYPERPROLACTINEMIA (HCC): ICD-10-CM

## 2022-08-19 DIAGNOSIS — E55.9 VITAMIN D DEFICIENCY: ICD-10-CM

## 2022-08-19 PROCEDURE — 99211 OFF/OP EST MAY X REQ PHY/QHP: CPT

## 2022-08-19 PROCEDURE — 99214 OFFICE O/P EST MOD 30 MIN: CPT

## 2022-08-19 RX ORDER — CABERGOLINE 0.5 MG/1
1 TABLET ORAL
Qty: 48 TABLET | Refills: 6 | Status: SHIPPED | OUTPATIENT
Start: 2022-08-19 | End: 2023-02-21 | Stop reason: SDUPTHER

## 2022-08-19 ASSESSMENT — FIBROSIS 4 INDEX: FIB4 SCORE: 1.07

## 2022-08-19 NOTE — PROGRESS NOTES
Chief Complaint: Follow-up on the following conditions  Subjective:   Amy Ugalde is a 51 y.o. female     1.  Pituitary adenoma:  She was first diagnosed with a pituitary tumor in about 6 years ago.      Presenting symptoms which led to the investigation of the pituitary included galactorrhea.    Initial MRI of the pituitary on about 6 years ago revealed pituitary tummor.      She sees a neurologist Dr. Collazo at [facility],   MRI on 3/28/2022 showed a stable 0.3mm microadenoma-Next appt next year with Neurosurgery   MRI on  March 2020 showed that the a Lactotroph-pituitary adenoma that went from a 10 x 7 mm in size to about 10 x 2 mm in size    She denies headache, denies vision difficulties, denies frequent urination, denies increased thirst, denies galactorrhea.  She reports breast tenderness    Currently taking cabergoline 1 mg 2 times a week     Latest Reference Range & Units 8/13/22 07:56   Acth 7.2 - 63.3 pg/mL 28.4   IGF1 55 - 235 ng/mL 103   IGF-1 Z Score Calculation  -0.5       History:   She was a patient of Dr. Veras  She has a 10 mm pituitary adenoma and hyperprolactinemia  She takes Risperdal, there was a concern that this medication is responsible for elevated prolactin levels and/or the tumor  She is on Carbergoline and the tumor size has not decreased in size substantially      2.  Hyperprolactinemia:  Cabergoline dose increased on last appointment from 0.5 mg twice a week to 1 mg twice a week     Latest Reference Range & Units 8/13/22 07:56   Prolactin 2.80 - 26.00 ng/mL 11.20      Latest Reference Range & Units 05/14/22 09:03   Prolactin 2.80 - 26.00 ng/mL 59.40 (H)     3.Vitamin D deficiency:  Currently taking 5000 IUs OTC daily of vitamin D3   Latest Reference Range & Units 05/14/22 09:03   25-Hydroxy   Vitamin D 25 30 - 100 ng/mL 21 (L) [1]       Patient's medications, allergies, and social histories were reviewed and updated as appropriate.    ROS:     CONS:     No  fever, no chills, no weight loss, no fatigue   EYES:      No diplopia, no blurry vision, no redness of eyes, no swelling of eyelids   ENT:    No hearing loss, No ear pain, No sore throat, no dysphagia, no neck swelling   CV:     No chest pain, no palpitations, no claudication, no orthopnea, no PND   PULM:    No SOB, no cough, no hemoptysis, no wheezing    GI:   No nausea, no vomiting, no diarrhea, no constipation, no bloody stools   :  Passing urine well, no dysuria, no hematuria   ENDO:   No polyuria, no polydipsia, no heat intolerance, no cold intolerance   NEURO: No headaches, no dizziness, no convulsions, no tremors   MUSC:  No joint swellings, no arthralgias, no myalgias, no weakness   SKIN:   No rash, no ulcers, no dry skin   PSYCH:   No depression, no anxiety, no difficulty sleeping       Past Medical History:  Patient Active Problem List    Diagnosis Date Noted    Ingrown toenail 04/14/2022    Prediabetes 02/15/2022    Mixed hyperlipidemia 02/15/2022    Sensation of fullness in both ears 11/01/2021    Seasonal allergies 10/14/2021    Skin lesions 04/09/2021    Pituitary adenoma ([facility]) 01/21/2019    Pituitary lesion ([facility]) 05/15/2018    Hyperprolactinemia ([facility]) 05/30/2017    Amenorrhea 01/06/2016    Bilateral nipple discharge 01/06/2016    Other schizophrenia ([facility]) 12/15/2015    Insomnia 10/30/2013    Major depression 10/03/2013    Psychosis ([facility]) 10/03/2013       Past Surgical History:  Past Surgical History:   Procedure Laterality Date    REPEAT C SECTION W TUBAL LIGATION  6/20/2013    Performed by Marylu Sanchez M.D. at [facility] DEL[facility]    GYN SURGERY      csection    PELVISCOPY      for fertility    PRIMARY C SECTION          Allergies:  Patient has no known allergies.     Current Medications:    Current Outpatient Medications:     COVID-19 mRNA Vac-Katelynn,Pfizer, (COVID-19 VACCINE) 30 MCG/0.3ML Suspension injection, Inject  into the shoulder, thigh, or buttocks., Disp: 0.3 mL, Rfl: 0    cabergoline  "(DOSTINEX) 0.5 MG tablet, Take 2 Tablets by mouth two times a week., Disp: 24 Tablet, Rfl: 3    cabergoline (DOSTINEX) 0.5 MG tablet, Take 1 Tablet by mouth 3 times a week., Disp: 36 Tablet, Rfl: 3    azelastine (ASTELIN) 137 MCG/SPRAY nasal spray, ADMINISTER 1 SPRAY INTO AFFECTED NOSTRIL(S) 2 TIMES A DAY., Disp: 30 mL, Rfl: 3    traZODone (DESYREL) 100 MG Tab, trazodone 100 mg tablet  Take 1 tablet twice a day by oral route., Disp: , Rfl:     risperidone (RISPERDAL) 2 MG Tab, Take 1 Tab by mouth every bedtime., Disp: 30 Tab, Rfl: 2    citalopram (CELEXA) 20 MG Tab, Take 1 Tab by mouth every day. TOME JON TABLETA POR VIA ORAL TODOS LOS CORONADO, Disp: 30 Tab, Rfl: 2    Social History:  Social History     Socioeconomic History    Marital status:      Spouse name: Not on file    Number of children: Not on file    Years of education: Not on file    Highest education level: Not on file   Occupational History    Not on file   Tobacco Use    Smoking status: Never    Smokeless tobacco: Never   Vaping Use    Vaping Use: Never used   Substance and Sexual Activity    Alcohol use: No    Drug use: No    Sexual activity: Not Currently     Birth control/protection: Surgical     Comment: BTL'   Other Topics Concern    Not on file   Social History Narrative    Not on file     Social Determinants of Health     Financial Resource Strain: Not on file   Food Insecurity: Not on file   Transportation Needs: Not on file   Physical Activity: Not on file   Stress: Not on file   Social Connections: Not on file   Intimate Partner Violence: Not on file   Housing Stability: Not on file        Family History:   Family History   Problem Relation Age of Onset    Lung Disease Mother         emphysema    Kidney stones Mother          PHYSICAL EXAM:   Vital signs: /62   Pulse 88   Ht 1.575 m (5' 2\")   Wt 71.7 kg (158 lb)   SpO2 100%   BMI 28.90 kg/m²   GENERAL: Well-developed, well-nourished  in no apparent distress.   EYES: no " peripheral visual imparements with visual field test  NECK: Supple. Trachea midline. thyroid is normal in size without nodules or tenderness  CARDIOVASCULAR: Regular rate and rhythm. No murmurs, rubs, or gallops.   LUNGS: Clear to auscultation bilaterally   EXTREMITIES: No clubbing, cyanosis, or edema.   NEUROLOGICAL: Cranial nerves II-XII are grossly intact   Symmetric reflexes at the patella no proximal muscle weakness, No visible tremor with both outstretched hands  LYMPH: No cervical, supraclavicular,  adenopathy palpated.   SKIN: No rashes, lesions. Turgor is normal.    Labs:  Lab Results   Component Value Date/Time    WBC 5.3 02/09/2022 06:45 AM    RBC 4.73 02/09/2022 06:45 AM    HEMOGLOBIN 14.0 02/09/2022 06:45 AM    MCV 90.9 02/09/2022 06:45 AM    MCH 29.6 02/09/2022 06:45 AM    MCHC 32.6 (L) 02/09/2022 06:45 AM    RDW 42.4 02/09/2022 06:45 AM    MPV 9.4 02/09/2022 06:45 AM       Lab Results   Component Value Date/Time    SODIUM 139 02/09/2022 06:45 AM    POTASSIUM 4.3 02/09/2022 06:45 AM    CHLORIDE 104 02/09/2022 06:45 AM    CO2 25 02/09/2022 06:45 AM    ANION 10.0 02/09/2022 06:45 AM    GLUCOSE 102 (H) 02/09/2022 06:45 AM    BUN 7 (L) 02/09/2022 06:45 AM    CREATININE 0.66 02/09/2022 06:45 AM    CALCIUM 9.3 02/09/2022 06:45 AM    ASTSGOT 15 02/09/2022 06:45 AM    ALTSGPT 7 02/09/2022 06:45 AM    TBILIRUBIN 0.3 02/09/2022 06:45 AM    ALBUMIN 4.3 02/09/2022 06:45 AM    TOTPROTEIN 7.6 02/09/2022 06:45 AM    GLOBULIN 3.3 02/09/2022 06:45 AM    AGRATIO 1.3 02/09/2022 06:45 AM       Lab Results   Component Value Date/Time    TSHULTRASEN 1.990 01/15/2022 0910     Lab Results   Component Value Date/Time    FREEDIR 1.01 05/23/2017 0757     Imaging:    Bone Density done on 12/23/2016: Spine T Score: -1.0, Hip T Score: -0.6 with a low risk fracture probability        ASSESSMENT/PLAN:   1. Pituitary adenoma (HCC)  Stable  Followed by neurosurgery-see HPI  - cabergoline (DOSTINEX) 0.5 MG tablet; Take 2 Tablets by  mouth two times a week.  Dispense: 48 Tablet; Refill: 6  - PROLACTIN; Future  - ACTH; Future  - TSH; Future  - FREE THYROXINE; Future  - IGF-1 SOMATOMEDIN; Future    2. Hyperprolactinemia (HCC)  Stable  Continue regimen-see HPI  - cabergoline (DOSTINEX) 0.5 MG tablet; Take 2 Tablets by mouth two times a week.  Dispense: 48 Tablet; Refill: 6  - PROLACTIN; Future  - ACTH; Future  - TSH; Future  - FREE THYROXINE; Future  - IGF-1 SOMATOMEDIN; Future    3. Vitamin D deficiency  Unstable  Encouraged to continue taking 5000 IUs OTC of vitamin D3   - VITAMIN D,25 HYDROXY; Future      Disposition: Make an appointment to follow-up in 6 months  Do your blood work prior to next appointment      Thank you kindly for allowing me to participate in the diabetes care plan for this patient.    Dave Pham, [email or web address].[email or web address].N.  08/19/2022

## 2022-10-13 NOTE — TELEPHONE ENCOUNTER
Would you like to place labs before pt comes in 11/11    Results for BAYRON ROBIN (MRN [id number]) as of 11/5/2019 16:56   Ref. Range 3/20/2019 08:05 5/22/2019 07:44 7/10/2019 07:32 9/18/2019 08:10   TSH Latest Ref Range: 0.380 - 5.330 uIU/mL 1.920      Free T-4 Latest Ref Range: 0.53 - 1.43 ng/dL 0.83      IGF1 Latest Ref Range: 60 - 240 ng/mL 123      IGF-1 Z Score Calculation Unknown -0.2      Prolactin Latest Ref Range: 2.80 - 26.00 ng/mL 31.92 (H) 17.71 39.81 (H) 14.58      Pt w/o IV access, x2 attempts by RN Danny, contacted 5E (CCU) and ED, unable to send RN due to change of shift. Awaiting vancomycin dose.

## 2022-11-17 ENCOUNTER — OFFICE VISIT (OUTPATIENT)
Dept: MEDICAL GROUP | Facility: PHYSICIAN GROUP | Age: 52
End: 2022-11-17
Payer: COMMERCIAL

## 2022-11-17 VITALS
SYSTOLIC BLOOD PRESSURE: 110 MMHG | BODY MASS INDEX: 29.81 KG/M2 | HEIGHT: 62 IN | DIASTOLIC BLOOD PRESSURE: 62 MMHG | HEART RATE: 72 BPM | TEMPERATURE: 98.6 F | WEIGHT: 162 LBS | OXYGEN SATURATION: 95 %

## 2022-11-17 DIAGNOSIS — E22.1 HYPERPROLACTINEMIA (HCC): ICD-10-CM

## 2022-11-17 DIAGNOSIS — G47.00 INSOMNIA, UNSPECIFIED TYPE: ICD-10-CM

## 2022-11-17 DIAGNOSIS — F20.89 OTHER SCHIZOPHRENIA (HCC): ICD-10-CM

## 2022-11-17 DIAGNOSIS — F33.3 SEVERE EPISODE OF RECURRENT MAJOR DEPRESSIVE DISORDER, WITH PSYCHOTIC FEATURES (HCC): ICD-10-CM

## 2022-11-17 PROCEDURE — 99214 OFFICE O/P EST MOD 30 MIN: CPT | Performed by: FAMILY MEDICINE

## 2022-11-17 ASSESSMENT — FIBROSIS 4 INDEX: FIB4 SCORE: 1.07

## 2022-11-17 NOTE — PROGRESS NOTES
"Subjective:     Chief Complaint   Patient presents with    Follow-Up     routine       HPI:   Amy presents today to discuss the following.    Hyperprolactinemia (CMS-HCC)  Chronic issue  Follows up with endo  Needs new referral today    Still takes cabergoline    Other schizophrenia  Chronic issue  On risperdal     Insomnia  Chronic issue  On trazodone    Major depression  Chronic issue  On celexa    Past Medical History:   Diagnosis Date    Depression     Hyperprolactinemia (HCC)     probably secondary to medication    Mixed hyperlipidemia 2/15/2022    Pituitary lesion (HCC)     probably nonfunctional cyst / pituitary function intact    Schizophrenia (HCC)        Current Outpatient Medications Ordered in Epic   Medication Sig Dispense Refill    cabergoline (DOSTINEX) 0.5 MG tablet Take 2 Tablets by mouth two times a week. 48 Tablet 6    azelastine (ASTELIN) 137 MCG/SPRAY nasal spray ADMINISTER 1 SPRAY INTO AFFECTED NOSTRIL(S) 2 TIMES A DAY. 30 mL 3    traZODone (DESYREL) 100 MG Tab trazodone 100 mg tablet   Take 1 tablet twice a day by oral route.      risperidone (RISPERDAL) 2 MG Tab Take 1 Tab by mouth every bedtime. 30 Tab 2    citalopram (CELEXA) 20 MG Tab Take 1 Tab by mouth every day. TOME JON TABLETA POR VIA ORAL TODOS LOS CORONADO 30 Tab 2     No current Epic-ordered facility-administered medications on file.       Allergies:  Patient has no known allergies.    Health Maintenance: Completed    ROS:  Gen: no fevers/chills, no changes in weight  Eyes: no changes in vision  Pulm: no sob, no cough  CV: no chest pain, no palpitations  GI: no nausea/vomiting, no diarrhea      Objective:     Exam:  /62 (BP Location: Left arm, Patient Position: Sitting, BP Cuff Size: Adult)   Pulse 72   Temp 37 °C (98.6 °F) (Temporal)   Ht 1.575 m (5' 2\")   Wt 73.5 kg (162 lb)   SpO2 95%   BMI 29.63 kg/m²  Body mass index is 29.63 kg/m².      Constitutional: Alert, no distress, well-groomed.  Skin: Warm, dry, good " turgor, no rashes in visible areas.  Eye: Equal, round and reactive, conjunctiva clear, lids normal.  ENMT: Lips without lesions, good dentition, moist mucous membranes.  Neck: Trachea midline, no masses, no thyromegaly.  Respiratory: Unlabored respiratory effort, no cough.  MSK: Normal gait, moves all extremities.  Neuro: Grossly non-focal.   Psych: Alert and oriented x3, normal affect and mood.        Assessment & Plan:     52 y.o. female with the following -     1. Hyperprolactinemia (HCC)  Chronic condition.  Continue to follow-up with endocrinology.  Referral renewal provided.  - Referral to Endocrinology    2. Other schizophrenia (HCC)  Chronic, stable condition.  Continue with psychiatry.  Continue with Risperdal.    3. Insomnia, unspecified type  Chronic, stable condition.  Continue with trazodone.    4. Severe episode of recurrent major depressive disorder, with psychotic features (HCC)  Chronic, stable condition.  Continue with Celexa.      Return in about 6 months (around 5/17/2023).          Please note that this dictation was created using voice recognition software. I have made every reasonable attempt to correct obvious errors, but I expect that there are errors of grammar and possibly content that I did not discover before finalizing the note.

## 2023-02-15 ENCOUNTER — HOSPITAL ENCOUNTER (OUTPATIENT)
Dept: LAB | Facility: MEDICAL CENTER | Age: 53
End: 2023-02-15
Payer: COMMERCIAL

## 2023-02-15 DIAGNOSIS — D35.2 PITUITARY ADENOMA (HCC): ICD-10-CM

## 2023-02-15 DIAGNOSIS — E55.9 VITAMIN D DEFICIENCY: ICD-10-CM

## 2023-02-15 DIAGNOSIS — E22.1 HYPERPROLACTINEMIA (HCC): ICD-10-CM

## 2023-02-15 LAB
25(OH)D3 SERPL-MCNC: 38 NG/ML (ref 30–100)
PROLACTIN SERPL-MCNC: 10.3 NG/ML (ref 2.8–26)
T4 FREE SERPL-MCNC: 1.07 NG/DL (ref 0.93–1.7)
TSH SERPL DL<=0.005 MIU/L-ACNC: 1.67 UIU/ML (ref 0.38–5.33)

## 2023-02-15 PROCEDURE — 84146 ASSAY OF PROLACTIN: CPT

## 2023-02-15 PROCEDURE — 82024 ASSAY OF ACTH: CPT

## 2023-02-15 PROCEDURE — 84439 ASSAY OF FREE THYROXINE: CPT

## 2023-02-15 PROCEDURE — 84305 ASSAY OF SOMATOMEDIN: CPT

## 2023-02-15 PROCEDURE — 84443 ASSAY THYROID STIM HORMONE: CPT

## 2023-02-15 PROCEDURE — 36415 COLL VENOUS BLD VENIPUNCTURE: CPT

## 2023-02-15 PROCEDURE — 82306 VITAMIN D 25 HYDROXY: CPT

## 2023-02-16 LAB — ACTH PLAS-MCNC: 17.5 PG/ML (ref 7.2–63.3)

## 2023-02-17 LAB
IGF-I SERPL-MCNC: 84 NG/ML (ref 55–235)
IGF-I Z-SCORE SERPL: -1

## 2023-02-21 ENCOUNTER — OFFICE VISIT (OUTPATIENT)
Dept: ENDOCRINOLOGY | Facility: MEDICAL CENTER | Age: 53
End: 2023-02-21
Payer: COMMERCIAL

## 2023-02-21 VITALS
DIASTOLIC BLOOD PRESSURE: 68 MMHG | HEART RATE: 82 BPM | OXYGEN SATURATION: 96 % | SYSTOLIC BLOOD PRESSURE: 122 MMHG | WEIGHT: 166 LBS | HEIGHT: 62 IN | BODY MASS INDEX: 30.55 KG/M2

## 2023-02-21 DIAGNOSIS — E22.1 HYPERPROLACTINEMIA (HCC): ICD-10-CM

## 2023-02-21 DIAGNOSIS — D35.2 PITUITARY ADENOMA (HCC): ICD-10-CM

## 2023-02-21 DIAGNOSIS — E55.9 VITAMIN D DEFICIENCY: ICD-10-CM

## 2023-02-21 PROCEDURE — 99214 OFFICE O/P EST MOD 30 MIN: CPT

## 2023-02-21 PROCEDURE — 99211 OFF/OP EST MAY X REQ PHY/QHP: CPT

## 2023-02-21 RX ORDER — CABERGOLINE 0.5 MG/1
1 TABLET ORAL
Qty: 48 TABLET | Refills: 6 | Status: SHIPPED | OUTPATIENT
Start: 2023-02-21 | End: 2023-11-30 | Stop reason: SDUPTHER

## 2023-02-21 ASSESSMENT — FIBROSIS 4 INDEX: FIB4 SCORE: 1.07

## 2023-02-21 NOTE — PROGRESS NOTES
Chief Complaint: Follow-up on the following conditions  Subjective:   Amy Ugalde is a 51 y.o. female     1.  Pituitary adenoma:  She was first diagnosed with a pituitary tumor in about 6 years ago.      Presenting symptoms which led to the investigation of the pituitary included galactorrhea.    Initial MRI of the pituitary on about 6 years ago revealed pituitary tummor.      She sees a neurologist Dr. Collazo at [facility]   MRI on 3/28/2022 showed a stable 0.3mm microadenoma-last appt last year    MRI on  March 2020 showed that the a Lactotroph-pituitary adenoma that went from a 10 x 7 mm in size to about 10 x 2 mm in size    She denies headache, denies vision difficulties, denies frequent urination, denies increased thirst, denies galactorrhea, breast tenderness     Currently taking cabergoline 1 mg 2 times a week   Latest Reference Range & Units 02/15/23 07:28   TSH 0.380 - 5.330 uIU/mL 1.670   Free T-4 0.93 - 1.70 ng/dL 1.07      Latest Reference Range & Units 02/15/23 07:28   IGF1 55 - 235 ng/mL 84   IGF-1 Z Score Calculation  -1.0   Prolactin 2.80 - 26.00 ng/mL 10.30      Latest Reference Range & Units 02/15/23 07:27   Acth 7.2 - 63.3 pg/mL 17.5       2.  Hyperprolactinemia:  Cabergoline 1 mg twice a week-denies SE      Latest Reference Range & Units 02/15/23 07:28   Prolactin 2.80 - 26.00 ng/mL 10.30      Latest Reference Range & Units 05/14/22 09:03   Prolactin 2.80 - 26.00 ng/mL 59.40 (H)     3.Vitamin D deficiency:  Currently taking 5000 IUs OTC daily of vitamin D3   Latest Reference Range & Units 02/15/23 07:28   25-Hydroxy   Vitamin D 25 30 - 100 ng/mL 38      Latest Reference Range & Units 05/14/22 09:03   25-Hydroxy   Vitamin D 25 30 - 100 ng/mL 21 (L) [1]       Patient's medications, allergies, and social histories were reviewed and updated as appropriate.    ROS:     CONS:     No fever, no chills, no weight loss, no fatigue   EYES:      No diplopia, no blurry vision, no redness  of eyes, no swelling of eyelids   ENT:    No hearing loss, No ear pain, No sore throat, no dysphagia, no neck swelling   CV:     No chest pain, no palpitations, no claudication, no orthopnea, no PND   PULM:    No SOB, no cough, no hemoptysis, no wheezing    GI:   No nausea, no vomiting, no diarrhea, no constipation, no bloody stools   :  Passing urine well, no dysuria, no hematuria   ENDO:   No polyuria, no polydipsia, no heat intolerance, no cold intolerance   NEURO: No headaches, no dizziness, no convulsions, no tremors   MUSC:  No joint swellings, no arthralgias, no myalgias, no weakness   SKIN:   No rash, no ulcers, no dry skin   PSYCH:   No depression, no anxiety, no difficulty sleeping       Past Medical History:  Patient Active Problem List    Diagnosis Date Noted    Ingrown toenail 04/14/2022    Prediabetes 02/15/2022    Mixed hyperlipidemia 02/15/2022    Sensation of fullness in both ears 11/01/2021    Seasonal allergies 10/14/2021    Skin lesions 04/09/2021    Pituitary adenoma (HCC) 01/21/2019    Pituitary lesion ([facility]) 05/15/2018    Hyperprolactinemia ([facility]) 05/30/2017    Amenorrhea 01/06/2016    Bilateral nipple discharge 01/06/2016    Other schizophrenia ([facility]) 12/15/2015    Insomnia 10/30/2013    Major depression 10/03/2013    Psychosis ([facility]) 10/03/2013       Past Surgical History:  Past Surgical History:   Procedure Laterality Date    REPEAT C SECTION W TUBAL LIGATION  6/20/2013    Performed by Marylu Sanchez M.D. at [facility]    GYN SURGERY      csection    PELVISCOPY      for fertility    PRIMARY C SECTION          Allergies:  Patient has no known allergies.     Current Medications:    Current Outpatient Medications:     cabergoline (DOSTINEX) 0.5 MG tablet, Take 2 Tablets by mouth two times a week., Disp: 48 Tablet, Rfl: 6    azelastine (ASTELIN) 137 MCG/SPRAY nasal spray, ADMINISTER 1 SPRAY INTO AFFECTED NOSTRIL(S) 2 TIMES A DAY., Disp: 30 mL, Rfl: 3    traZODone (DESYREL) 100 MG  "Tab, trazodone 100 mg tablet  Take 1 tablet twice a day by oral route., Disp: , Rfl:     risperidone (RISPERDAL) 2 MG Tab, Take 1 Tab by mouth every bedtime., Disp: 30 Tab, Rfl: 2    citalopram (CELEXA) 20 MG Tab, Take 1 Tab by mouth every day. TOME JON TABLETA POR VIA ORAL TODOS LOS CORONADO, Disp: 30 Tab, Rfl: 2    Social History:  Social History     Socioeconomic History    Marital status:      Spouse name: Not on file    Number of children: Not on file    Years of education: Not on file    Highest education level: Not on file   Occupational History    Not on file   Tobacco Use    Smoking status: Never    Smokeless tobacco: Never   Vaping Use    Vaping Use: Never used   Substance and Sexual Activity    Alcohol use: No    Drug use: No    Sexual activity: Not Currently     Birth control/protection: Surgical     Comment: BTL'   Other Topics Concern    Not on file   Social History Narrative    Not on file     Social Determinants of Health     Financial Resource Strain: Not on file   Food Insecurity: Not on file   Transportation Needs: Not on file   Physical Activity: Not on file   Stress: Not on file   Social Connections: Not on file   Intimate Partner Violence: Not on file   Housing Stability: Not on file        Family History:   Family History   Problem Relation Age of Onset    Lung Disease Mother         emphysema    Kidney stones Mother          PHYSICAL EXAM:   Vital signs: /68 (BP Location: Right arm, Patient Position: Sitting, BP Cuff Size: Adult)   Pulse 82   Ht 1.575 m (5' 2\")   Wt 75.3 kg (166 lb)   SpO2 96%   BMI 30.36 kg/m²   GENERAL: Well-developed, well-nourished  in no apparent distress.   EYES: no peripheral visual imparements with visual field test  NECK: Supple. Trachea midline. thyroid is normal in size without nodules or tenderness  CARDIOVASCULAR: Regular rate and rhythm. No murmurs, rubs, or gallops.   LUNGS: Clear to auscultation bilaterally   EXTREMITIES: No clubbing, " cyanosis, or edema.   NEUROLOGICAL: Cranial nerves II-XII are grossly intact   Symmetric reflexes at the patella no proximal muscle weakness, No visible tremor with both outstretched hands  LYMPH: No cervical, supraclavicular,  adenopathy palpated.   SKIN: No rashes, lesions. Turgor is normal.    Labs:  Lab Results   Component Value Date/Time    TSHULTRASEN 1.990 01/15/2022 0910     Lab Results   Component Value Date/Time    FREEDIR 1.01 05/23/2017 0757     Imaging:    Bone Density done on 12/23/2016: Spine T Score: -1.0, Hip T Score: -0.6 with a low risk fracture probability        ASSESSMENT/PLAN:   1. Pituitary adenoma (HCC)  Stable  Followed by neurosurgery-see HPI  - IGF-1 SOMATOMEDIN; Future  - FREE THYROXINE; Future  - TSH; Future  - Comp Metabolic Panel; Future  - ACTH; Future  - cabergoline (DOSTINEX) 0.5 MG tablet; Take 2 Tablets by mouth two times a week.  Dispense: 48 Tablet; Refill: 6    2. Hyperprolactinemia (HCC)  Stable  Continue regimen-see HPI  - PROLACTIN; Future  - cabergoline (DOSTINEX) 0.5 MG tablet; Take 2 Tablets by mouth two times a week.  Dispense: 48 Tablet; Refill: 6    3. Vitamin D deficiency  Unstable  - VITAMIN D,25 HYDROXY (DEFICIENCY); Future       Disposition: Make an appointment to follow-up in 6 months  Do your blood work 2 weeks prior to next appointment      Thank you kindly for allowing me to participate in the diabetes care plan for this patient.    Dave Pham, [email or web address].P.R.N.  02/21/2023

## 2023-02-23 ENCOUNTER — OFFICE VISIT (OUTPATIENT)
Dept: MEDICAL GROUP | Facility: PHYSICIAN GROUP | Age: 53
End: 2023-02-23
Payer: COMMERCIAL

## 2023-02-23 VITALS
OXYGEN SATURATION: 97 % | RESPIRATION RATE: 16 BRPM | DIASTOLIC BLOOD PRESSURE: 60 MMHG | WEIGHT: 163.6 LBS | BODY MASS INDEX: 30.11 KG/M2 | SYSTOLIC BLOOD PRESSURE: 102 MMHG | HEART RATE: 76 BPM | HEIGHT: 62 IN | TEMPERATURE: 98.7 F

## 2023-02-23 DIAGNOSIS — J02.9 SORE THROAT: ICD-10-CM

## 2023-02-23 DIAGNOSIS — J06.9 VIRAL URI WITH COUGH: ICD-10-CM

## 2023-02-23 LAB
EXTERNAL QUALITY CONTROL: NORMAL
FLUAV+FLUBV AG SPEC QL IA: NEGATIVE
INT CON NEG: NEGATIVE
INT CON POS: POSITIVE
S PYO AG THROAT QL: NEGATIVE
SARS-COV+SARS-COV-2 AG RESP QL IA.RAPID: NEGATIVE

## 2023-02-23 PROCEDURE — 87804 INFLUENZA ASSAY W/OPTIC: CPT | Performed by: STUDENT IN AN ORGANIZED HEALTH CARE EDUCATION/TRAINING PROGRAM

## 2023-02-23 PROCEDURE — 99214 OFFICE O/P EST MOD 30 MIN: CPT | Performed by: STUDENT IN AN ORGANIZED HEALTH CARE EDUCATION/TRAINING PROGRAM

## 2023-02-23 PROCEDURE — 87426 SARSCOV CORONAVIRUS AG IA: CPT | Performed by: STUDENT IN AN ORGANIZED HEALTH CARE EDUCATION/TRAINING PROGRAM

## 2023-02-23 PROCEDURE — 87880 STREP A ASSAY W/OPTIC: CPT | Performed by: STUDENT IN AN ORGANIZED HEALTH CARE EDUCATION/TRAINING PROGRAM

## 2023-02-23 RX ORDER — BENZONATATE 100 MG/1
100 CAPSULE ORAL 3 TIMES DAILY PRN
Qty: 60 CAPSULE | Refills: 0 | Status: SHIPPED | OUTPATIENT
Start: 2023-02-23 | End: 2023-11-30

## 2023-02-23 ASSESSMENT — FIBROSIS 4 INDEX: FIB4 SCORE: 1.07

## 2023-02-23 NOTE — PROGRESS NOTES
CC:  Diagnoses of Viral URI with cough and Sore throat were pertinent to this visit.    HISTORY OF THE PRESENT ILLNESS: Patient is a 52 y.o. female.  Her daughter is present with her today and provides translation.  This is a patient of Dr. Colin Engel MD.  This pleasant patient is here today to discuss:    1. Viral URI with cough  2. Sore throat  Onset of symptoms approximately 2 weeks ago.    Patient endorses sore throat, nonproductive cough, achy bones and joints.  Her  has been having similar symptoms.     Patient denies fever subjective or otherwise, difficulty with eating or drinking, loss of sense of taste or smell, NVD, rash.    She has been immunized with 4 doses of COVID-vaccine and her seasonal influenza vaccine.    Her  has been demonstrating similar symptoms.    Active Diagnosis:    Patient Active Problem List   Diagnosis    Major depression    Psychosis ([facility])    Insomnia    Other schizophrenia ([facility])    Amenorrhea    Bilateral nipple discharge    Hyperprolactinemia (HCC)    Pituitary lesion (HCC)    Pituitary adenoma (HCC)    Skin lesions    Seasonal allergies    Sensation of fullness in both ears    Prediabetes    Mixed hyperlipidemia    Ingrown toenail      Current Outpatient Medications Ordered in Epic   Medication Sig Dispense Refill    benzonatate (TESSALON) 100 MG Cap Take 1 Capsule by mouth 3 times a day as needed for Cough. 60 Capsule 0    cabergoline (DOSTINEX) 0.5 MG tablet Take 2 Tablets by mouth two times a week. 48 Tablet 6    traZODone (DESYREL) 100 MG Tab trazodone 100 mg tablet   Take 1 tablet twice a day by oral route.      risperidone (RISPERDAL) 2 MG Tab Take 1 Tab by mouth every bedtime. 30 Tab 2    citalopram (CELEXA) 20 MG Tab Take 1 Tab by mouth every day. TOME JON TABLETA POR VIA ORAL TODOS LOS CORONADO 30 Tab 2    azelastine (ASTELIN) 137 MCG/SPRAY nasal spray ADMINISTER 1 SPRAY INTO AFFECTED NOSTRIL(S) 2 TIMES A DAY. (Patient not taking: Reported on 2/21/2023)  "30 mL 3     No current [facility]-ordered facility-administered medications on file.     ROS:   See HPI.    Objective:     Exam: /60 (BP Location: Left arm, Patient Position: Sitting, BP Cuff Size: Adult)   Pulse 76   Temp 37.1 °C (98.7 °F) (Temporal)   Resp 16   Ht 1.575 m (5' 2\")   Wt 74.2 kg (163 lb 9.6 oz)   SpO2 97%  Body mass index is 29.92 kg/m².    General: Normal appearing. No distress.  HEENT: No posterior pharyngeal erythema, exudate or swelling.  Tonsils +1 bilaterally.  Lymph: No anterior/posterior cervical, occipital or supraclavicular lymph nodes could be palpated.  No tenderness in these regions.  Pulmonary: No increased work of breathing.  Bibasilar crackles that clear with a cough.  neurologic: Grossly nonfocal.    Skin: Warm and dry.  No obvious lesions.  Musculoskeletal: No extremity cyanosis, clubbing, or edema.  Psych: Normal mood and affect.     A chaperone was offered to the patient during today's exam. Patient declined chaperone.      Assessment & Plan:   52 y.o. female with the following -    Labs:   2/23/2023:  -POCT strep a, negative  -POCT influenza A/B, negative  -POCT COVID-19, negative    2/9/2022:  -CMP showing glucose 102, alk phos 107.    1. Viral URI with cough  2. Sore throat  -Acute uncomplicated illness.  -The self-limited nature of these types of illnesses was discussed with the patient today.  - POCT Rapid Strep A  - POCT Influenza A/B  - POCT SARS-COV Antigen NISA (Symptomatic only)  - benzonatate (TESSALON) 100 MG Cap; Take 1 Capsule by mouth 3 times a day as needed for Cough.  Dispense: 60 Capsule; Refill: 0  -Ibuprofen 800 mg 3 times daily as needed myalgias and pharyngitis.  OTC.    Return if symptoms worsen or fail to improve.    Please note that this dictation was created using voice recognition software. I have made every reasonable attempt to correct obvious errors, but I expect that there are errors of grammar and possibly content that I did not discover before " finalizing the note.      Chris Frederick PA-C 2/23/2023

## 2023-02-23 NOTE — PATIENT INSTRUCTIONS
For sore throat and aching muscles/joints:    Ibuprofen 800 mg three times daily as needed. Best when taken with food.   (4) no impairment

## 2023-05-23 ENCOUNTER — OFFICE VISIT (OUTPATIENT)
Dept: MEDICAL GROUP | Facility: PHYSICIAN GROUP | Age: 53
End: 2023-05-23
Payer: COMMERCIAL

## 2023-05-23 VITALS
DIASTOLIC BLOOD PRESSURE: 60 MMHG | HEIGHT: 62 IN | TEMPERATURE: 97.5 F | BODY MASS INDEX: 30.18 KG/M2 | WEIGHT: 164 LBS | HEART RATE: 68 BPM | SYSTOLIC BLOOD PRESSURE: 92 MMHG | OXYGEN SATURATION: 96 %

## 2023-05-23 DIAGNOSIS — F20.89 OTHER SCHIZOPHRENIA (HCC): ICD-10-CM

## 2023-05-23 DIAGNOSIS — F33.3 SEVERE EPISODE OF RECURRENT MAJOR DEPRESSIVE DISORDER, WITH PSYCHOTIC FEATURES (HCC): ICD-10-CM

## 2023-05-23 DIAGNOSIS — Z12.31 ENCOUNTER FOR SCREENING MAMMOGRAM FOR MALIGNANT NEOPLASM OF BREAST: ICD-10-CM

## 2023-05-23 DIAGNOSIS — D35.2 PITUITARY ADENOMA (HCC): ICD-10-CM

## 2023-05-23 PROCEDURE — 3074F SYST BP LT 130 MM HG: CPT | Performed by: FAMILY MEDICINE

## 2023-05-23 PROCEDURE — 99214 OFFICE O/P EST MOD 30 MIN: CPT | Performed by: FAMILY MEDICINE

## 2023-05-23 PROCEDURE — 3078F DIAST BP <80 MM HG: CPT | Performed by: FAMILY MEDICINE

## 2023-05-23 ASSESSMENT — FIBROSIS 4 INDEX: FIB4 SCORE: 1.09

## 2023-05-23 NOTE — PROGRESS NOTES
"Subjective:     Chief Complaint   Patient presents with    Follow-Up       HPI:   Amy presents today to discuss the following.    Other schizophrenia (HCC)  chornic issue  On risperdal   Seeing psychiatry     Pituitary adenoma (HCC)  Chronic issue  Seeing endo  Stable prolactin     Major depression  Chronic issue  On celexa    Past Medical History:   Diagnosis Date    Depression     Hyperprolactinemia (HCC)     probably secondary to medication    Mixed hyperlipidemia 2/15/2022    Pituitary lesion (HCC)     probably nonfunctional cyst / pituitary function intact    Schizophrenia (HCC)        Current Outpatient Medications Ordered in Epic   Medication Sig Dispense Refill    benzonatate (TESSALON) 100 MG Cap Take 1 Capsule by mouth 3 times a day as needed for Cough. 60 Capsule 0    cabergoline (DOSTINEX) 0.5 MG tablet Take 2 Tablets by mouth two times a week. 48 Tablet 6    traZODone (DESYREL) 100 MG Tab trazodone 100 mg tablet   Take 1 tablet twice a day by oral route.      risperidone (RISPERDAL) 2 MG Tab Take 1 Tab by mouth every bedtime. 30 Tab 2    citalopram (CELEXA) 20 MG Tab Take 1 Tab by mouth every day. TOME JON TABLETA POR VIA ORAL TODOS LOS CORONADO 30 Tab 2     No current Epic-ordered facility-administered medications on file.       Allergies:  Patient has no known allergies.    Health Maintenance: Completed    ROS:  Gen: no fevers/chills, no changes in weight  Eyes: no changes in vision  Pulm: no sob, no cough  CV: no chest pain, no palpitations  GI: no nausea/vomiting, no diarrhea      Objective:     Exam:  BP 92/60 (BP Location: Left arm, Patient Position: Sitting, BP Cuff Size: Adult)   Pulse 68   Temp 36.4 °C (97.5 °F) (Temporal)   Ht 1.575 m (5' 2\")   Wt 74.4 kg (164 lb)   SpO2 96%   BMI 30.00 kg/m²  Body mass index is 30 kg/m².    Gen: Alert and oriented, No apparent distress.  Eyes: PERRLA  Lungs: Normal effort, CTA bilaterally, no wheezes, rhonchi, or rales  CV: Regular rate and rhythm. No " murmurs, rubs, or gallops.  Ext: No clubbing, cyanosis, edema.  Skin: no rash, lesions or ulcers  Neuro: Moves all extremities.  Psych: AAOx3        Assessment & Plan:     53 y.o. female with the following -     1. Other schizophrenia (HCC)  Chronic, stable condition.  Continue to follow-up with psychiatry.  Continue with Risperdal.    2. Pituitary adenoma (HCC)  Chronic, stable condition.  Continue to follow-up with endocrinology.  Continue with cabergoline    3. Severe episode of recurrent major depressive disorder, with psychotic features (HCC)  Chronic, stable condition.  Continue with Celexa    4. Encounter for screening mammogram for malignant neoplasm of breast  - MA-SCREENING MAMMO BILAT W/TOMOSYNTHESIS W/CAD; Future      Return in about 6 months (around 11/23/2023).          Please note that this dictation was created using voice recognition software. I have made every reasonable attempt to correct obvious errors, but I expect that there are errors of grammar and possibly content that I did not discover before finalizing the note.

## 2023-05-24 ENCOUNTER — HOSPITAL ENCOUNTER (OUTPATIENT)
Dept: RADIOLOGY | Facility: MEDICAL CENTER | Age: 53
End: 2023-05-24
Attending: FAMILY MEDICINE
Payer: COMMERCIAL

## 2023-05-24 DIAGNOSIS — Z12.31 ENCOUNTER FOR SCREENING MAMMOGRAM FOR MALIGNANT NEOPLASM OF BREAST: ICD-10-CM

## 2023-05-24 PROCEDURE — 77063 BREAST TOMOSYNTHESIS BI: CPT

## 2023-07-27 ENCOUNTER — APPOINTMENT (OUTPATIENT)
Dept: RADIOLOGY | Facility: MEDICAL CENTER | Age: 53
End: 2023-07-27
Attending: OBSTETRICS & GYNECOLOGY
Payer: COMMERCIAL

## 2023-07-27 DIAGNOSIS — R19.09 GROIN MASS: ICD-10-CM

## 2023-07-27 PROCEDURE — 76830 TRANSVAGINAL US NON-OB: CPT

## 2023-08-16 ENCOUNTER — HOSPITAL ENCOUNTER (OUTPATIENT)
Dept: LAB | Facility: MEDICAL CENTER | Age: 53
End: 2023-08-16
Payer: COMMERCIAL

## 2023-08-16 DIAGNOSIS — D35.2 PITUITARY ADENOMA (HCC): ICD-10-CM

## 2023-08-16 DIAGNOSIS — E55.9 VITAMIN D DEFICIENCY: ICD-10-CM

## 2023-08-16 DIAGNOSIS — E22.1 HYPERPROLACTINEMIA (HCC): ICD-10-CM

## 2023-08-16 LAB
25(OH)D3 SERPL-MCNC: 46 NG/ML (ref 30–100)
ALBUMIN SERPL BCP-MCNC: 4.4 G/DL (ref 3.2–4.9)
ALBUMIN/GLOB SERPL: 1.3 G/DL
ALP SERPL-CCNC: 108 U/L (ref 30–99)
ALT SERPL-CCNC: 14 U/L (ref 2–50)
ANION GAP SERPL CALC-SCNC: 12 MMOL/L (ref 7–16)
AST SERPL-CCNC: 20 U/L (ref 12–45)
BILIRUB SERPL-MCNC: 0.4 MG/DL (ref 0.1–1.5)
BUN SERPL-MCNC: 8 MG/DL (ref 8–22)
CALCIUM ALBUM COR SERPL-MCNC: 9.4 MG/DL (ref 8.5–10.5)
CALCIUM SERPL-MCNC: 9.7 MG/DL (ref 8.5–10.5)
CHLORIDE SERPL-SCNC: 103 MMOL/L (ref 96–112)
CO2 SERPL-SCNC: 22 MMOL/L (ref 20–33)
CREAT SERPL-MCNC: 0.62 MG/DL (ref 0.5–1.4)
FASTING STATUS PATIENT QL REPORTED: NORMAL
GFR SERPLBLD CREATININE-BSD FMLA CKD-EPI: 107 ML/MIN/1.73 M 2
GLOBULIN SER CALC-MCNC: 3.5 G/DL (ref 1.9–3.5)
GLUCOSE SERPL-MCNC: 100 MG/DL (ref 65–99)
POTASSIUM SERPL-SCNC: 4.2 MMOL/L (ref 3.6–5.5)
PROLACTIN SERPL-MCNC: 11.1 NG/ML (ref 2.8–26)
PROT SERPL-MCNC: 7.9 G/DL (ref 6–8.2)
SODIUM SERPL-SCNC: 137 MMOL/L (ref 135–145)
T4 FREE SERPL-MCNC: 1.23 NG/DL (ref 0.93–1.7)
TSH SERPL DL<=0.005 MIU/L-ACNC: 1.73 UIU/ML (ref 0.38–5.33)

## 2023-08-16 PROCEDURE — 36415 COLL VENOUS BLD VENIPUNCTURE: CPT

## 2023-08-16 PROCEDURE — 82024 ASSAY OF ACTH: CPT

## 2023-08-16 PROCEDURE — 84443 ASSAY THYROID STIM HORMONE: CPT

## 2023-08-16 PROCEDURE — 82306 VITAMIN D 25 HYDROXY: CPT

## 2023-08-16 PROCEDURE — 84305 ASSAY OF SOMATOMEDIN: CPT

## 2023-08-16 PROCEDURE — 84146 ASSAY OF PROLACTIN: CPT

## 2023-08-16 PROCEDURE — 80053 COMPREHEN METABOLIC PANEL: CPT

## 2023-08-16 PROCEDURE — 84439 ASSAY OF FREE THYROXINE: CPT

## 2023-08-17 LAB
ACTH PLAS-MCNC: 30 PG/ML (ref 7.2–63.3)
IGF-I SERPL-MCNC: 89 NG/ML (ref 53–234)
IGF-I Z-SCORE SERPL: -0.8

## 2023-08-22 ENCOUNTER — TELEMEDICINE (OUTPATIENT)
Dept: ENDOCRINOLOGY | Facility: MEDICAL CENTER | Age: 53
End: 2023-08-22
Payer: COMMERCIAL

## 2023-08-22 VITALS — HEIGHT: 62 IN | WEIGHT: 164 LBS | BODY MASS INDEX: 30.18 KG/M2

## 2023-08-22 DIAGNOSIS — E55.9 VITAMIN D DEFICIENCY: ICD-10-CM

## 2023-08-22 DIAGNOSIS — D35.2 PITUITARY ADENOMA (HCC): ICD-10-CM

## 2023-08-22 DIAGNOSIS — E22.1 HYPERPROLACTINEMIA (HCC): ICD-10-CM

## 2023-08-22 PROCEDURE — 99214 OFFICE O/P EST MOD 30 MIN: CPT | Mod: 95

## 2023-08-22 ASSESSMENT — FIBROSIS 4 INDEX: FIB4 SCORE: 1.03

## 2023-08-22 NOTE — PROGRESS NOTES
Chief Complaint: Follow-up on the following conditions  Patient was presented for a telehealth consultation via secure and encrypted videoconferencing technology. This encounter was conducted via Zoom . Verbal consent was obtained. Patient's identity was verified.   Subjective:   Amy Ugalde is a 51 y.o. female     1.  Pituitary adenoma:  She was first diagnosed with a pituitary tumor in about 6 years ago.      Presenting symptoms which led to the investigation of the pituitary included galactorrhea.    Initial MRI of the pituitary on about 6 years ago revealed pituitary tummor.      She sees a neurologist Dr. Collazo at [facility]         She denies headache, denies vision difficulties, frequent urination, increased thirst,galactorrhea, breast tenderness     Currently taking cabergoline 1 mg 2 times a week   Latest Reference Range & Units 08/16/23 07:59   TSH 0.380 - 5.330 uIU/mL 1.730   Free T-4 0.93 - 1.70 ng/dL 1.23      Latest Reference Range & Units 08/16/23 07:59   Acth 7.2 - 63.3 pg/mL 30.0   IGF1 53 - 234 ng/mL 89   IGF-1 Z Score Calculation  -0.8   Prolactin 2.80 - 26.00 ng/mL 11.10      Latest Reference Range & Units 02/15/23 07:27   Acth 7.2 - 63.3 pg/mL 17.5      Latest Reference Range & Units 08/16/23 07:59   GFR (CKD-EPI) >60 mL/min/1.73 m 2 107     2.  Hyperprolactinemia:  Cabergoline 1 mg twice a week-denies SE      Latest Reference Range & Units 08/16/23 07:59   Prolactin 2.80 - 26.00 ng/mL 11.10      Latest Reference Range & Units 05/14/22 09:03   Prolactin 2.80 - 26.00 ng/mL 59.40 (H)     3.Vitamin D deficiency:  Currently taking 5000 IUs OTC daily of vitamin D3   Latest Reference Range & Units 08/16/23 07:59   25-Hydroxy   Vitamin D 25 30 - 100 ng/mL 46       Patient's medications, allergies, and social histories were reviewed and updated as appropriate.    ROS:     CONS:     No fever, no chills, no weight loss, no fatigue   EYES:      No diplopia, no blurry vision, no  redness of eyes, no swelling of eyelids   ENT:    No hearing loss, No ear pain, No sore throat, no dysphagia, no neck swelling   CV:     No chest pain, no palpitations, no claudication, no orthopnea, no PND   PULM:    No SOB, no cough, no hemoptysis, no wheezing    GI:   No nausea, no vomiting, no diarrhea, no constipation, no bloody stools   :  Passing urine well, no dysuria, no hematuria   ENDO:   No polyuria, no polydipsia, no heat intolerance, no cold intolerance   NEURO: No headaches, no dizziness, no convulsions, no tremors   MUSC:  No joint swellings, no arthralgias, no myalgias, no weakness   SKIN:   No rash, no ulcers, no dry skin   PSYCH:   No depression, no anxiety, no difficulty sleeping       Past Medical History:  Patient Active Problem List    Diagnosis Date Noted    Ingrown toenail 04/14/2022    Prediabetes 02/15/2022    Mixed hyperlipidemia 02/15/2022    Sensation of fullness in both ears 11/01/2021    Seasonal allergies 10/14/2021    Skin lesions 04/09/2021    Pituitary adenoma (HCC) 01/21/2019    Pituitary lesion ([facility]) 05/15/2018    Hyperprolactinemia ([facility]) 05/30/2017    Amenorrhea 01/06/2016    Bilateral nipple discharge 01/06/2016    Other schizophrenia ([facility]) 12/15/2015    Insomnia 10/30/2013    Major depression 10/03/2013    Psychosis ([facility]) 10/03/2013       Past Surgical History:  Past Surgical History:   Procedure Laterality Date    REPEAT C SECTION W TUBAL LIGATION  6/20/2013    Performed by Marylu Sanchez M.D. at [facility]    GYN SURGERY      csection    PELVISCOPY      for fertility    PRIMARY C SECTION          Allergies:  Patient has no known allergies.     Current Medications:    Current Outpatient Medications:     benzonatate (TESSALON) 100 MG Cap, Take 1 Capsule by mouth 3 times a day as needed for Cough., Disp: 60 Capsule, Rfl: 0    cabergoline (DOSTINEX) 0.5 MG tablet, Take 2 Tablets by mouth two times a week., Disp: 48 Tablet, Rfl: 6    traZODone (DESYREL) 100 MG  "Tab, trazodone 100 mg tablet  Take 1 tablet twice a day by oral route., Disp: , Rfl:     risperidone (RISPERDAL) 2 MG Tab, Take 1 Tab by mouth every bedtime., Disp: 30 Tab, Rfl: 2    citalopram (CELEXA) 20 MG Tab, Take 1 Tab by mouth every day. TOME JON TABLETA POR VIA ORAL TODOS LOS CORONADO, Disp: 30 Tab, Rfl: 2    Social History:  Social History     Socioeconomic History    Marital status:      Spouse name: Not on file    Number of children: Not on file    Years of education: Not on file    Highest education level: Not on file   Occupational History    Not on file   Tobacco Use    Smoking status: Never    Smokeless tobacco: Never   Vaping Use    Vaping Use: Never used   Substance and Sexual Activity    Alcohol use: No    Drug use: No    Sexual activity: Not Currently     Birth control/protection: Surgical     Comment: BTL'   Other Topics Concern    Not on file   Social History Narrative    Not on file     Social Determinants of Health     Financial Resource Strain: Not on file   Food Insecurity: Not on file   Transportation Needs: Not on file   Physical Activity: Not on file   Stress: Not on file   Social Connections: Not on file   Intimate Partner Violence: Not on file   Housing Stability: Not on file        Family History:   Family History   Problem Relation Age of Onset    Lung Disease Mother         emphysema    Kidney stones Mother          PHYSICAL EXAM:   Vital signs: Ht 1.575 m (5' 2\")   Wt 74.4 kg (164 lb)   BMI 30.00 kg/m²   GENERAL: Well-developed, well-nourished  in no apparent distress.   SKIN: No rashes, lesions. Turgor is normal.    Labs:  Lab Results   Component Value Date/Time    TSHULTRASEN 1.990 01/15/2022 0910     Lab Results   Component Value Date/Time    FREEDIR 1.01 05/23/2017 0757     Imaging:    Bone Density done on 12/23/2016: Spine T Score: -1.0, Hip T Score: -0.6 with a low risk fracture probability        ASSESSMENT/PLAN:   1. Pituitary adenoma (HCC)  Stable  Followed by " neurosurgery    2. Hyperprolactinemia (HCC)  Clinically stable  Biochemically stable  Cabergoline decreased to 0.5mg twice a week     3. Vitamin D deficiency  - Stable  -Continue regimen-see HPI      Disposition: Make an appointment to follow-up in 3 months  Do your blood work 2 weeks prior to next appointment      Thank you kindly for allowing me to participate in the diabetes care plan for this patient.    IZABELA CoradoR.N.  02/21/2023

## 2023-08-24 ENCOUNTER — APPOINTMENT (OUTPATIENT)
Dept: MEDICAL GROUP | Facility: PHYSICIAN GROUP | Age: 53
End: 2023-08-24
Payer: COMMERCIAL

## 2023-08-30 ENCOUNTER — DOCUMENTATION (OUTPATIENT)
Dept: HEALTH INFORMATION MANAGEMENT | Facility: OTHER | Age: 53
End: 2023-08-30
Payer: COMMERCIAL

## 2023-11-09 ENCOUNTER — TELEPHONE (OUTPATIENT)
Dept: HEALTH INFORMATION MANAGEMENT | Facility: OTHER | Age: 53
End: 2023-11-09
Payer: COMMERCIAL

## 2023-11-18 ENCOUNTER — HOSPITAL ENCOUNTER (OUTPATIENT)
Dept: LAB | Facility: MEDICAL CENTER | Age: 53
End: 2023-11-18
Payer: COMMERCIAL

## 2023-11-18 DIAGNOSIS — D35.2 PITUITARY ADENOMA (HCC): ICD-10-CM

## 2023-11-18 DIAGNOSIS — E22.1 HYPERPROLACTINEMIA (HCC): ICD-10-CM

## 2023-11-18 LAB — PROLACTIN SERPL-MCNC: 19.2 NG/ML (ref 2.8–26)

## 2023-11-18 PROCEDURE — 84146 ASSAY OF PROLACTIN: CPT

## 2023-11-18 PROCEDURE — 36415 COLL VENOUS BLD VENIPUNCTURE: CPT

## 2023-11-30 ENCOUNTER — OFFICE VISIT (OUTPATIENT)
Dept: ENDOCRINOLOGY | Facility: MEDICAL CENTER | Age: 53
End: 2023-11-30
Payer: COMMERCIAL

## 2023-11-30 VITALS
WEIGHT: 166.2 LBS | HEART RATE: 76 BPM | DIASTOLIC BLOOD PRESSURE: 58 MMHG | OXYGEN SATURATION: 97 % | BODY MASS INDEX: 30.59 KG/M2 | HEIGHT: 62 IN | SYSTOLIC BLOOD PRESSURE: 92 MMHG

## 2023-11-30 DIAGNOSIS — E55.9 VITAMIN D DEFICIENCY: ICD-10-CM

## 2023-11-30 DIAGNOSIS — E22.1 HYPERPROLACTINEMIA (HCC): ICD-10-CM

## 2023-11-30 DIAGNOSIS — D35.2 PITUITARY ADENOMA (HCC): ICD-10-CM

## 2023-11-30 PROCEDURE — 99211 OFF/OP EST MAY X REQ PHY/QHP: CPT

## 2023-11-30 PROCEDURE — 99214 OFFICE O/P EST MOD 30 MIN: CPT

## 2023-11-30 PROCEDURE — 3078F DIAST BP <80 MM HG: CPT

## 2023-11-30 PROCEDURE — 3074F SYST BP LT 130 MM HG: CPT

## 2023-11-30 RX ORDER — CABERGOLINE 0.5 MG/1
1 TABLET ORAL
Qty: 48 TABLET | Refills: 6 | Status: SHIPPED | OUTPATIENT
Start: 2023-11-30

## 2023-11-30 ASSESSMENT — FIBROSIS 4 INDEX: FIB4 SCORE: 1.03

## 2023-11-30 NOTE — PROGRESS NOTES
Chief Complaint: Follow-up on the following conditions  Subjective:   Amy Ugalde is a 53 y.o. female     1.  Pituitary adenoma:  She was first diagnosed with a pituitary tumor in about 6 years ago.      Presenting symptoms which led to the investigation of the pituitary included galactorrhea.    Initial MRI of the pituitary on about 6 years ago revealed pituitary tummor.      She sees a neurologist Dr. Collazo at [facility]   MRI on 3/28/2022 showed a stable 0.3mm microadenoma-last appt last year    MRI on  March 2020 showed that the a Lactotroph-pituitary adenoma that went from a 10 x 7 mm in size to about 10 x 2 mm in size    She denies headache, denies vision difficulties, denies frequent urination, denies increased thirst, denies galactorrhea, breast tenderness     Currently taking cabergoline 1 mg 2 times a week   Latest Reference Range & Units 08/16/23 07:59   Bun 8 - 22 mg/dL 8   Creatinine 0.50 - 1.40 mg/dL 0.62   GFR (CKD-EPI) >60 mL/min/1.73 m 2 107      Latest Reference Range & Units 08/16/23 07:59   TSH 0.380 - 5.330 uIU/mL 1.730   Free T-4 0.93 - 1.70 ng/dL 1.23      Latest Reference Range & Units 08/16/23 07:59   Acth 7.2 - 63.3 pg/mL 30.0   IGF1 53 - 234 ng/mL 89   IGF-1 Z Score Calculation  -0.8         2.  Hyperprolactinemia:  Cabergoline 1 mg twice a week-denies SE      Latest Reference Range & Units 08/16/23 07:59 11/18/23 09:21   Prolactin 2.80 - 26.00 ng/mL 11.10 19.20      Latest Reference Range & Units 05/14/22 09:03   Prolactin 2.80 - 26.00 ng/mL 59.40 (H)     3.Vitamin D deficiency:  Currently taking 5000 IUs OTC daily of vitamin D3   Latest Reference Range & Units 08/16/23 07:59   25-Hydroxy   Vitamin D 25 30 - 100 ng/mL 46         Patient's medications, allergies, and social histories were reviewed and updated as appropriate.    ROS:     CONS:     No fever, no chills, no weight loss, no fatigue   EYES:      No diplopia, no blurry vision, no redness of eyes, no swelling  of eyelids   ENT:    No hearing loss, No ear pain, No sore throat, no dysphagia, no neck swelling   CV:     No chest pain, no palpitations, no claudication, no orthopnea, no PND   PULM:    No SOB, no cough, no hemoptysis, no wheezing    GI:   No nausea, no vomiting, no diarrhea, no constipation, no bloody stools   :  Passing urine well, no dysuria, no hematuria   ENDO:   No polyuria, no polydipsia, no heat intolerance, no cold intolerance   NEURO: No headaches, no dizziness, no convulsions, no tremors   MUSC:  No joint swellings, no arthralgias, no myalgias, no weakness   SKIN:   No rash, no ulcers, no dry skin   PSYCH:   No depression, no anxiety, no difficulty sleeping       Past Medical History:  Patient Active Problem List    Diagnosis Date Noted    Ingrown toenail 04/14/2022    Prediabetes 02/15/2022    Mixed hyperlipidemia 02/15/2022    Sensation of fullness in both ears 11/01/2021    Seasonal allergies 10/14/2021    Skin lesions 04/09/2021    Pituitary adenoma (HCC) 01/21/2019    Pituitary lesion ([facility]) 05/15/2018    Hyperprolactinemia ([facility]) 05/30/2017    Amenorrhea 01/06/2016    Bilateral nipple discharge 01/06/2016    Other schizophrenia ([facility]) 12/15/2015    Insomnia 10/30/2013    Major depression 10/03/2013    Psychosis ([facility]) 10/03/2013       Past Surgical History:  Past Surgical History:   Procedure Laterality Date    REPEAT C SECTION W TUBAL LIGATION  6/20/2013    Performed by Marylu Sanchez M.D. at [facility]    GYN SURGERY      csection    PELVISCOPY      for fertility    PRIMARY C SECTION          Allergies:  Patient has no known allergies.     Current Medications:    Current Outpatient Medications:     benzonatate (TESSALON) 100 MG Cap, Take 1 Capsule by mouth 3 times a day as needed for Cough., Disp: 60 Capsule, Rfl: 0    cabergoline (DOSTINEX) 0.5 MG tablet, Take 2 Tablets by mouth two times a week., Disp: 48 Tablet, Rfl: 6    traZODone (DESYREL) 100 MG Tab, trazodone 100 mg tablet   "Take 1 tablet twice a day by oral route., Disp: , Rfl:     risperidone (RISPERDAL) 2 MG Tab, Take 1 Tab by mouth every bedtime., Disp: 30 Tab, Rfl: 2    citalopram (CELEXA) 20 MG Tab, Take 1 Tab by mouth every day. TOME JON TABLETA POR VIA ORAL TODOS LOS CORONADO, Disp: 30 Tab, Rfl: 2    Social History:  Social History     Socioeconomic History    Marital status:      Spouse name: Not on file    Number of children: Not on file    Years of education: Not on file    Highest education level: Not on file   Occupational History    Not on file   Tobacco Use    Smoking status: Never    Smokeless tobacco: Never   Vaping Use    Vaping Use: Never used   Substance and Sexual Activity    Alcohol use: No    Drug use: No    Sexual activity: Not Currently     Birth control/protection: Surgical     Comment: BTL'   Other Topics Concern    Not on file   Social History Narrative    Not on file     Social Determinants of Health     Financial Resource Strain: Not on file   Food Insecurity: Not on file   Transportation Needs: Not on file   Physical Activity: Not on file   Stress: Not on file   Social Connections: Not on file   Intimate Partner Violence: Not on file   Housing Stability: Not on file        Family History:   Family History   Problem Relation Age of Onset    Lung Disease Mother         emphysema    Kidney stones Mother          PHYSICAL EXAM:   Vital signs: BP 92/58 (BP Location: Left arm, Patient Position: Sitting, BP Cuff Size: Adult)   Pulse 76   Ht 1.575 m (5' 2\")   Wt 75.4 kg (166 lb 3.2 oz)   SpO2 97%   BMI 30.40 kg/m²   GENERAL: Well-developed, well-nourished  in no apparent distress.   SKIN: No rashes, lesions. Turgor is normal.    Labs:  Lab Results   Component Value Date/Time    TSHULTRASEN 1.990 01/15/2022 0910     Lab Results   Component Value Date/Time    FREEDIR 1.01 05/23/2017 0757     Imaging:    Bone Density done on 12/23/2016: Spine T Score: -1.0, Hip T Score: -0.6 with a low risk fracture " probability        ASSESSMENT/PLAN:   1. Pituitary adenoma (HCC)  Stable  Followed by neurosurgery-see HPI  - PROLACTIN; Future  - IGF-1 SOMATOMEDIN; Future  - ACTH; Future  - CORTISOL; Future  - TSH; Future  - FREE THYROXINE; Future  - cabergoline (DOSTINEX) 0.5 MG tablet; Take 2 Tablets by mouth two times a week.  Dispense: 48 Tablet; Refill: 6    2. Hyperprolactinemia (HCC)  Stable  Continue regimen-see HPI  - PROLACTIN; Future  - cabergoline (DOSTINEX) 0.5 MG tablet; Take 2 Tablets by mouth two times a week.  Dispense: 48 Tablet; Refill: 6    3. Vitamin D deficiency  Unstable  - VITAMIN D,25 HYDROXY (DEFICIENCY); Future       Disposition: Make an appointment to follow-up in 12 months  Do your blood work 2 weeks prior to next appointment      Thank you kindly for allowing me to participate in the diabetes care plan for this patient.    Dave Pham [email or web address]  11/30/23

## 2024-01-17 ENCOUNTER — APPOINTMENT (OUTPATIENT)
Dept: MEDICAL GROUP | Age: 54
End: 2024-01-17
Payer: COMMERCIAL

## 2024-02-11 SDOH — HEALTH STABILITY: PHYSICAL HEALTH

## 2024-02-11 SDOH — ECONOMIC STABILITY: INCOME INSECURITY: IN THE LAST 12 MONTHS, WAS THERE A TIME WHEN YOU WERE NOT ABLE TO PAY THE MORTGAGE OR RENT ON TIME?: YES

## 2024-02-11 SDOH — ECONOMIC STABILITY: HOUSING INSECURITY: IN THE LAST 12 MONTHS, HOW MANY PLACES HAVE YOU LIVED?: 1

## 2024-02-11 SDOH — ECONOMIC STABILITY: HOUSING INSECURITY
IN THE LAST 12 MONTHS, WAS THERE A TIME WHEN YOU DID NOT HAVE A STEADY PLACE TO SLEEP OR SLEPT IN A SHELTER (INCLUDING NOW)?: YES

## 2024-02-11 SDOH — ECONOMIC STABILITY: HOUSING INSECURITY
IN THE LAST 12 MONTHS, WAS THERE A TIME WHEN YOU DID NOT HAVE A STEADY PLACE TO SLEEP OR SLEPT IN A SHELTER (INCLUDING NOW)?: NO

## 2024-02-11 SDOH — ECONOMIC STABILITY: FOOD INSECURITY: WITHIN THE PAST 12 MONTHS, THE FOOD YOU BOUGHT JUST DIDN'T LAST AND YOU DIDN'T HAVE MONEY TO GET MORE.: NEVER TRUE

## 2024-02-11 SDOH — HEALTH STABILITY: MENTAL HEALTH

## 2024-02-11 SDOH — ECONOMIC STABILITY: FOOD INSECURITY: WITHIN THE PAST 12 MONTHS, YOU WORRIED THAT YOUR FOOD WOULD RUN OUT BEFORE YOU GOT MONEY TO BUY MORE.: NEVER TRUE

## 2024-02-11 SDOH — ECONOMIC STABILITY: INCOME INSECURITY: HOW HARD IS IT FOR YOU TO PAY FOR THE VERY BASICS LIKE FOOD, HOUSING, MEDICAL CARE, AND HEATING?: NOT VERY HARD

## 2024-02-11 SDOH — ECONOMIC STABILITY: TRANSPORTATION INSECURITY
IN THE PAST 12 MONTHS, HAS LACK OF RELIABLE TRANSPORTATION KEPT YOU FROM MEDICAL APPOINTMENTS, MEETINGS, WORK OR FROM GETTING THINGS NEEDED FOR DAILY LIVING?: NO

## 2024-02-11 SDOH — ECONOMIC STABILITY: TRANSPORTATION INSECURITY
IN THE PAST 12 MONTHS, HAS LACK OF TRANSPORTATION KEPT YOU FROM MEETINGS, WORK, OR FROM GETTING THINGS NEEDED FOR DAILY LIVING?: NO

## 2024-02-11 SDOH — ECONOMIC STABILITY: TRANSPORTATION INSECURITY
IN THE PAST 12 MONTHS, HAS THE LACK OF TRANSPORTATION KEPT YOU FROM MEDICAL APPOINTMENTS OR FROM GETTING MEDICATIONS?: NO

## 2024-02-11 ASSESSMENT — SOCIAL DETERMINANTS OF HEALTH (SDOH)
HOW OFTEN DO YOU ATTEND CHURCH OR RELIGIOUS SERVICES?: MORE THAN 4 TIMES PER YEAR
HOW OFTEN DO YOU GET TOGETHER WITH FRIENDS OR RELATIVES?: THREE TIMES A WEEK
HOW OFTEN DO YOU GET TOGETHER WITH FRIENDS OR RELATIVES?: THREE TIMES A WEEK
HOW OFTEN DO YOU ATTENT MEETINGS OF THE CLUB OR ORGANIZATION YOU BELONG TO?: NEVER
HOW MANY DRINKS CONTAINING ALCOHOL DO YOU HAVE ON A TYPICAL DAY WHEN YOU ARE DRINKING: PATIENT DOES NOT DRINK
DO YOU BELONG TO ANY CLUBS OR ORGANIZATIONS SUCH AS CHURCH GROUPS UNIONS, FRATERNAL OR ATHLETIC GROUPS, OR SCHOOL GROUPS?: NO
HOW OFTEN DO YOU ATTEND CHURCH OR RELIGIOUS SERVICES?: MORE THAN 4 TIMES PER YEAR
HOW OFTEN DO YOU ATTENT MEETINGS OF THE CLUB OR ORGANIZATION YOU BELONG TO?: NEVER
WITHIN THE PAST 12 MONTHS, YOU WORRIED THAT YOUR FOOD WOULD RUN OUT BEFORE YOU GOT THE MONEY TO BUY MORE: NEVER TRUE
IN A TYPICAL WEEK, HOW MANY TIMES DO YOU TALK ON THE PHONE WITH FAMILY, FRIENDS, OR NEIGHBORS?: MORE THAN THREE TIMES A WEEK
IN A TYPICAL WEEK, HOW MANY TIMES DO YOU TALK ON THE PHONE WITH FAMILY, FRIENDS, OR NEIGHBORS?: MORE THAN THREE TIMES A WEEK
HOW OFTEN DO YOU HAVE A DRINK CONTAINING ALCOHOL: NEVER
HOW HARD IS IT FOR YOU TO PAY FOR THE VERY BASICS LIKE FOOD, HOUSING, MEDICAL CARE, AND HEATING?: NOT VERY HARD
HOW OFTEN DO YOU HAVE SIX OR MORE DRINKS ON ONE OCCASION: NEVER
DO YOU BELONG TO ANY CLUBS OR ORGANIZATIONS SUCH AS CHURCH GROUPS UNIONS, FRATERNAL OR ATHLETIC GROUPS, OR SCHOOL GROUPS?: NO

## 2024-02-11 ASSESSMENT — LIFESTYLE VARIABLES
AUDIT-C TOTAL SCORE: 0
HOW OFTEN DO YOU HAVE SIX OR MORE DRINKS ON ONE OCCASION: NEVER
HOW MANY STANDARD DRINKS CONTAINING ALCOHOL DO YOU HAVE ON A TYPICAL DAY: PATIENT DOES NOT DRINK
HOW OFTEN DO YOU HAVE A DRINK CONTAINING ALCOHOL: NEVER
SKIP TO QUESTIONS 9-10: 1

## 2024-02-12 ENCOUNTER — OFFICE VISIT (OUTPATIENT)
Dept: MEDICAL GROUP | Age: 54
End: 2024-02-12
Payer: COMMERCIAL

## 2024-02-12 VITALS
OXYGEN SATURATION: 95 % | HEART RATE: 69 BPM | BODY MASS INDEX: 30.55 KG/M2 | WEIGHT: 166 LBS | TEMPERATURE: 98.9 F | SYSTOLIC BLOOD PRESSURE: 114 MMHG | HEIGHT: 62 IN | DIASTOLIC BLOOD PRESSURE: 72 MMHG

## 2024-02-12 DIAGNOSIS — R73.03 PREDIABETES: ICD-10-CM

## 2024-02-12 DIAGNOSIS — Z11.4 SCREENING FOR HIV (HUMAN IMMUNODEFICIENCY VIRUS): ICD-10-CM

## 2024-02-12 DIAGNOSIS — J00 COMMON COLD: ICD-10-CM

## 2024-02-12 DIAGNOSIS — Z23 NEED FOR VACCINATION: ICD-10-CM

## 2024-02-12 DIAGNOSIS — F20.89 OTHER SCHIZOPHRENIA (HCC): ICD-10-CM

## 2024-02-12 DIAGNOSIS — Z11.59 NEED FOR HEPATITIS C SCREENING TEST: ICD-10-CM

## 2024-02-12 DIAGNOSIS — E78.2 MIXED HYPERLIPIDEMIA: ICD-10-CM

## 2024-02-12 DIAGNOSIS — D35.2 PITUITARY ADENOMA (HCC): ICD-10-CM

## 2024-02-12 PROBLEM — H93.8X3 SENSATION OF FULLNESS IN BOTH EARS: Status: RESOLVED | Noted: 2021-11-01 | Resolved: 2024-02-12

## 2024-02-12 PROBLEM — L60.0 INGROWN TOENAIL: Status: RESOLVED | Noted: 2022-04-14 | Resolved: 2024-02-12

## 2024-02-12 PROBLEM — L98.9 SKIN LESIONS: Status: RESOLVED | Noted: 2021-04-09 | Resolved: 2024-02-12

## 2024-02-12 PROCEDURE — 99214 OFFICE O/P EST MOD 30 MIN: CPT | Performed by: STUDENT IN AN ORGANIZED HEALTH CARE EDUCATION/TRAINING PROGRAM

## 2024-02-12 PROCEDURE — 3078F DIAST BP <80 MM HG: CPT | Performed by: STUDENT IN AN ORGANIZED HEALTH CARE EDUCATION/TRAINING PROGRAM

## 2024-02-12 PROCEDURE — 3074F SYST BP LT 130 MM HG: CPT | Performed by: STUDENT IN AN ORGANIZED HEALTH CARE EDUCATION/TRAINING PROGRAM

## 2024-02-12 ASSESSMENT — ENCOUNTER SYMPTOMS
COUGH: 0
WEAKNESS: 0
HEADACHES: 0
DEPRESSION: 0
PALPITATIONS: 0
MYALGIAS: 0
DIZZINESS: 0
HEARTBURN: 0
FEVER: 0
SHORTNESS OF BREATH: 0
CHILLS: 0
ABDOMINAL PAIN: 0
NECK PAIN: 0
BLOOD IN STOOL: 0
SINUS PAIN: 0
BLURRED VISION: 0
DOUBLE VISION: 0
BRUISES/BLEEDS EASILY: 0
WHEEZING: 0

## 2024-02-12 NOTE — PATIENT INSTRUCTIONS
- Continue home meds  - Fasting labs  - Follow up in 4 weeks   - Continue following with Psych and Endo

## 2024-02-12 NOTE — PROGRESS NOTES
"Subjective:     CC: Re-establish care    HPI:   Amy presents today to re-establish care. She was seen previously by Dr. Engel. She has a PMHX of Pituitary Adenoma, Dyslipidemia, Depression, Schizophrenia and Prediabetes.   She is currently taking Cabergoline given by Endo, she also gets MRI's yearly, she also takes Celexa and Risperidone and Trazodone given by Psychiatrist.  Patient states feeling that over the past several days she has experienced sensation of fullness in her left ear. She had an URI several weeks ago, her cold symptoms resolved within 8-10 days but recently started developing current symptom of sensation of fullness.  She denies fever, chills, otorrhea, ear pain or tinnitus.     Problem   Ingrown Toenail (Resolved)   Sensation of Fullness in Both Ears (Resolved)   Skin Lesions (Resolved)   Amenorrhea (Resolved)   Bilateral Nipple Discharge (Resolved)   Psychosis (Hcc) (Resolved)       Health Maintenance: Completed    ROS:  Review of Systems   Constitutional:  Negative for chills, fever and malaise/fatigue.   HENT:  Negative for congestion, ear discharge, ear pain, hearing loss, sinus pain and tinnitus.    Eyes:  Negative for blurred vision and double vision.   Respiratory:  Negative for cough, shortness of breath and wheezing.    Cardiovascular:  Negative for chest pain, palpitations and leg swelling.   Gastrointestinal:  Negative for abdominal pain, blood in stool, heartburn and melena.   Genitourinary:  Negative for dysuria, frequency and urgency.   Musculoskeletal:  Negative for myalgias and neck pain.   Neurological:  Negative for dizziness, weakness and headaches.   Endo/Heme/Allergies:  Does not bruise/bleed easily.   Psychiatric/Behavioral:  Negative for depression and suicidal ideas.        Objective:     Exam:  /72 (BP Location: Right arm, Patient Position: Sitting, BP Cuff Size: Adult)   Pulse 69   Temp 37.2 °C (98.9 °F) (Temporal)   Ht 1.575 m (5' 2\")   Wt 75.3 kg " (166 lb)   SpO2 95%   BMI 30.36 kg/m²  Body mass index is 30.36 kg/m².    Physical Exam  Vitals reviewed.   Constitutional:       General: She is not in acute distress.  HENT:      Head: Normocephalic and atraumatic.      Right Ear: Tympanic membrane and ear canal normal.      Left Ear: Ear canal normal.      Ears:      Comments: Left TM bulging but no erythema or effusion noted.     Mouth/Throat:      Mouth: Mucous membranes are moist.   Cardiovascular:      Rate and Rhythm: Normal rate and regular rhythm.      Heart sounds: No murmur heard.  Pulmonary:      Effort: No respiratory distress.      Breath sounds: No wheezing.   Abdominal:      General: There is no distension.      Palpations: Abdomen is soft.      Tenderness: There is no abdominal tenderness. There is no guarding or rebound.   Musculoskeletal:      Cervical back: Normal range of motion and neck supple.      Right lower leg: No edema.      Left lower leg: No edema.   Lymphadenopathy:      Cervical: No cervical adenopathy.   Skin:     Capillary Refill: Capillary refill takes less than 2 seconds.   Neurological:      General: No focal deficit present.      Mental Status: She is alert.   Psychiatric:         Mood and Affect: Mood normal.           Labs: Reviewed     Assessment & Plan:     53 y.o. female with the following -     1. Pituitary adenoma (HCC)  - Chronic, stable  - Following with Endo, taking Cabergoline   - MRI's yearly  - Asymptomatic, no galactorrhea     2. Other schizophrenia (HCC)  - Chronic, stable  - Following with Psych  - On Celexa and Risperidone    3. Mixed hyperlipidemia  - Chronic.  - No recent labs  - No on therapy  - Repeat labs  - Lipid Profile; Future    4. Prediabetes  - A1c in 5/22 was 5.8%  - HEMOGLOBIN A1C; Future    5. Common cold  - Cold symptoms few weeks ago but resolved within 8-10 days  - Now having left ear fullness over the past few days  - On exam Left MT Bulging but no erythema or otorrhea or evidence of  effusion   - CTM  - I will examine her again in follow up appointment in 4 weeks     6. Screening for HIV (human immunodeficiency virus)  - Never screened   - HIV AG/AB COMBO ASSAY SCREENING; Future    7. Need for hepatitis C screening test  - Never screened   - HEP C VIRUS ANTIBODY; Future    8. Need for vaccination  - Due  - Tdap Vaccine =>8YO IM         Return in about 4 weeks (around 3/11/2024) for Labs.    Please note that this dictation was created using voice recognition software. I have made every reasonable attempt to correct obvious errors, but I expect that there are errors of grammar and possibly content that I did not discover before finalizing the note.

## 2024-03-02 ENCOUNTER — APPOINTMENT (OUTPATIENT)
Dept: LAB | Facility: MEDICAL CENTER | Age: 54
End: 2024-03-02
Payer: COMMERCIAL

## 2024-03-05 ENCOUNTER — HOSPITAL ENCOUNTER (OUTPATIENT)
Dept: LAB | Facility: MEDICAL CENTER | Age: 54
End: 2024-03-05
Attending: STUDENT IN AN ORGANIZED HEALTH CARE EDUCATION/TRAINING PROGRAM
Payer: COMMERCIAL

## 2024-03-05 DIAGNOSIS — Z11.4 SCREENING FOR HIV (HUMAN IMMUNODEFICIENCY VIRUS): ICD-10-CM

## 2024-03-05 DIAGNOSIS — E78.2 MIXED HYPERLIPIDEMIA: ICD-10-CM

## 2024-03-05 DIAGNOSIS — Z11.59 NEED FOR HEPATITIS C SCREENING TEST: ICD-10-CM

## 2024-03-05 DIAGNOSIS — R73.03 PREDIABETES: ICD-10-CM

## 2024-03-05 LAB
CHOLEST SERPL-MCNC: 196 MG/DL (ref 100–199)
EST. AVERAGE GLUCOSE BLD GHB EST-MCNC: 114 MG/DL
FASTING STATUS PATIENT QL REPORTED: NORMAL
HBA1C MFR BLD: 5.6 % (ref 4–5.6)
HCV AB SER QL: NORMAL
HDLC SERPL-MCNC: 34 MG/DL
HIV 1+2 AB+HIV1 P24 AG SERPL QL IA: NORMAL
LDLC SERPL CALC-MCNC: 114 MG/DL
TRIGL SERPL-MCNC: 241 MG/DL (ref 0–149)

## 2024-03-05 PROCEDURE — 36415 COLL VENOUS BLD VENIPUNCTURE: CPT

## 2024-03-05 PROCEDURE — 83036 HEMOGLOBIN GLYCOSYLATED A1C: CPT

## 2024-03-05 PROCEDURE — 80061 LIPID PANEL: CPT

## 2024-03-05 PROCEDURE — 86803 HEPATITIS C AB TEST: CPT

## 2024-03-05 PROCEDURE — 87389 HIV-1 AG W/HIV-1&-2 AB AG IA: CPT

## 2024-03-11 ENCOUNTER — OFFICE VISIT (OUTPATIENT)
Dept: MEDICAL GROUP | Age: 54
End: 2024-03-11
Payer: COMMERCIAL

## 2024-03-11 VITALS
DIASTOLIC BLOOD PRESSURE: 64 MMHG | BODY MASS INDEX: 29.81 KG/M2 | WEIGHT: 162 LBS | HEART RATE: 82 BPM | HEIGHT: 62 IN | TEMPERATURE: 97.9 F | SYSTOLIC BLOOD PRESSURE: 116 MMHG | OXYGEN SATURATION: 96 %

## 2024-03-11 DIAGNOSIS — F20.89 OTHER SCHIZOPHRENIA (HCC): ICD-10-CM

## 2024-03-11 DIAGNOSIS — E78.2 MIXED HYPERLIPIDEMIA: ICD-10-CM

## 2024-03-11 DIAGNOSIS — M53.3 COCCYGEAL PAIN: ICD-10-CM

## 2024-03-11 DIAGNOSIS — F33.3 SEVERE EPISODE OF RECURRENT MAJOR DEPRESSIVE DISORDER, WITH PSYCHOTIC FEATURES (HCC): ICD-10-CM

## 2024-03-11 DIAGNOSIS — Z23 NEED FOR VACCINATION: ICD-10-CM

## 2024-03-11 PROCEDURE — 99214 OFFICE O/P EST MOD 30 MIN: CPT | Mod: 25 | Performed by: STUDENT IN AN ORGANIZED HEALTH CARE EDUCATION/TRAINING PROGRAM

## 2024-03-11 PROCEDURE — 3078F DIAST BP <80 MM HG: CPT | Performed by: STUDENT IN AN ORGANIZED HEALTH CARE EDUCATION/TRAINING PROGRAM

## 2024-03-11 PROCEDURE — 90715 TDAP VACCINE 7 YRS/> IM: CPT | Performed by: STUDENT IN AN ORGANIZED HEALTH CARE EDUCATION/TRAINING PROGRAM

## 2024-03-11 PROCEDURE — 90471 IMMUNIZATION ADMIN: CPT | Performed by: STUDENT IN AN ORGANIZED HEALTH CARE EDUCATION/TRAINING PROGRAM

## 2024-03-11 PROCEDURE — 3074F SYST BP LT 130 MM HG: CPT | Performed by: STUDENT IN AN ORGANIZED HEALTH CARE EDUCATION/TRAINING PROGRAM

## 2024-03-11 ASSESSMENT — ENCOUNTER SYMPTOMS
MYALGIAS: 0
SHORTNESS OF BREATH: 0
FEVER: 0
DIZZINESS: 0
BACK PAIN: 0
HEARTBURN: 0
COUGH: 0
DOUBLE VISION: 0
BLOOD IN STOOL: 0
HEADACHES: 0
WEAKNESS: 0
PALPITATIONS: 0
NECK PAIN: 0
BRUISES/BLEEDS EASILY: 0
DEPRESSION: 0
BLURRED VISION: 0
ABDOMINAL PAIN: 0
CHILLS: 0

## 2024-03-11 NOTE — PATIENT INSTRUCTIONS
- Continue home medications  - Regular exercise, DASH or Plant based Diet  - Follow up in 6 months for Annual Exam

## 2024-03-11 NOTE — PROGRESS NOTES
"Subjective:     CC: Follow up labs    HPI:   Amy presents today for follow up for lab review.  Labs showed mildly elevated LDL: 114 and Triglycerides: 241. Mildly low HDL:34 and normal Tot cholesterol.  A1c: 5.6%, and non reactive Hep C and HIV.  Today she had complaints of recent pain in her coccyx, this has occur only when sitting for prolonged time and usually only on hard surfaces. 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 She also states that 2 months she also developed right shoulder pain likely also due to overuse, but has improved significantly. She has not tried any OTC medications.   Otherwise she is doing well.    ROS:  Review of Systems   Constitutional:  Negative for chills, fever and malaise/fatigue.   HENT:  Negative for nosebleeds and tinnitus.    Eyes:  Negative for blurred vision and double vision.   Respiratory:  Negative for cough and shortness of breath.    Cardiovascular:  Negative for chest pain, palpitations and leg swelling.   Gastrointestinal:  Negative for abdominal pain, blood in stool, heartburn and melena.   Genitourinary:  Negative for dysuria and urgency.   Musculoskeletal:  Positive for joint pain. Negative for back pain, myalgias and neck pain.   Neurological:  Negative for dizziness, weakness and headaches.   Endo/Heme/Allergies:  Does not bruise/bleed easily.   Psychiatric/Behavioral:  Negative for depression and suicidal ideas.        Objective:     Exam:  /64 (BP Location: Left arm, Patient Position: Standing, BP Cuff Size: Large adult)   Pulse 82   Temp 36.6 °C (97.9 °F) (Temporal)   Ht 1.575 m (5' 2\")   Wt 73.5 kg (162 lb)   SpO2 96%   BMI 29.63 kg/m²  Body mass index is 29.63 kg/m².    Physical Exam  Vitals reviewed.   Constitutional:       General: She is not in acute distress.  HENT:      Head: Normocephalic and atraumatic.      Mouth/Throat:      Mouth: Mucous membranes are moist.   Eyes:      " General: No scleral icterus.     Extraocular Movements: Extraocular movements intact.      Pupils: Pupils are equal, round, and reactive to light.   Cardiovascular:      Rate and Rhythm: Normal rate and regular rhythm.      Heart sounds: Normal heart sounds. No murmur heard.  Pulmonary:      Effort: Pulmonary effort is normal. No respiratory distress.      Breath sounds: Normal breath sounds. No wheezing.   Abdominal:      General: There is no distension.      Palpations: Abdomen is soft.      Tenderness: There is no abdominal tenderness. There is no guarding or rebound.   Musculoskeletal:      Cervical back: Normal range of motion and neck supple.      Right lower leg: No edema.      Left lower leg: No edema.   Lymphadenopathy:      Cervical: No cervical adenopathy.   Skin:     General: Skin is warm.      Capillary Refill: Capillary refill takes less than 2 seconds.      Coloration: Skin is not jaundiced.   Neurological:      General: No focal deficit present.      Mental Status: She is alert.      Cranial Nerves: No cranial nerve deficit.   Psychiatric:         Mood and Affect: Mood normal.         Behavior: Behavior normal.       Labs: Reviewed     Assessment & Plan:     53 y.o. female with the following -     1. Mixed hyperlipidemia  - Chronic, stable  - Lipid profile mildly elevated LDL ans Triglycerides   - ASCVD score 2.1%  - Statin not recommended    2. Other schizophrenia (HCC)  3. Severe episode of recurrent major depressive disorder, with psychotic features (HCC)  - Chronic, stable  - Following with Psych  - NO SI of depressive symptoms    4. 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 Need for vaccination  - Due  - Tdap Vaccine =>6YO IM       Return in about 6 months  (around 9/11/2024) for Annual.    Please note that this dictation was created using voice recognition software. I have made every reasonable attempt to correct obvious errors, but I expect that there are errors of grammar and possibly content that I did not discover before finalizing the note.

## 2024-09-16 ENCOUNTER — OFFICE VISIT (OUTPATIENT)
Dept: MEDICAL GROUP | Age: 54
End: 2024-09-16
Payer: COMMERCIAL

## 2024-09-16 VITALS
HEART RATE: 71 BPM | TEMPERATURE: 98.7 F | SYSTOLIC BLOOD PRESSURE: 99 MMHG | WEIGHT: 160 LBS | OXYGEN SATURATION: 98 % | DIASTOLIC BLOOD PRESSURE: 55 MMHG | BODY MASS INDEX: 29.44 KG/M2 | HEIGHT: 62 IN

## 2024-09-16 DIAGNOSIS — F33.3 SEVERE EPISODE OF RECURRENT MAJOR DEPRESSIVE DISORDER, WITH PSYCHOTIC FEATURES (HCC): ICD-10-CM

## 2024-09-16 DIAGNOSIS — D35.2 PITUITARY ADENOMA (HCC): ICD-10-CM

## 2024-09-16 DIAGNOSIS — E78.2 MIXED HYPERLIPIDEMIA: ICD-10-CM

## 2024-09-16 DIAGNOSIS — E22.1 HYPERPROLACTINEMIA (HCC): ICD-10-CM

## 2024-09-16 DIAGNOSIS — Z00.00 WELLNESS EXAMINATION: ICD-10-CM

## 2024-09-16 DIAGNOSIS — G47.00 INSOMNIA, UNSPECIFIED TYPE: ICD-10-CM

## 2024-09-16 PROCEDURE — 3074F SYST BP LT 130 MM HG: CPT | Performed by: STUDENT IN AN ORGANIZED HEALTH CARE EDUCATION/TRAINING PROGRAM

## 2024-09-16 PROCEDURE — 3078F DIAST BP <80 MM HG: CPT | Performed by: STUDENT IN AN ORGANIZED HEALTH CARE EDUCATION/TRAINING PROGRAM

## 2024-09-16 PROCEDURE — 99396 PREV VISIT EST AGE 40-64: CPT | Performed by: STUDENT IN AN ORGANIZED HEALTH CARE EDUCATION/TRAINING PROGRAM

## 2024-09-16 NOTE — PATIENT INSTRUCTIONS
- Continue home meds  - Follow up with Endo in November  - Follow up with me in 6 months  - Repeat CMP and Lipid profile prior next visit

## 2024-09-16 NOTE — PROGRESS NOTES
Subjective:     CC: Annual exam  Chief Complaint   Patient presents with    Annual Exam       HPI:   Amy Ugalde is a 54 y.o. female who presents for annual exam. She is feeling well and denies any complaints.    Ob-Gyn/ History:    Patient has GYN provider: yes  /Para:  3/3  Last Pap Smear:  . No history of abnormal pap smears.  Gyn Surgery:  2 c- sections and tubal ligation.  Current Contraceptive Method:  None. Yes, she is currently sexually active.  Last menstrual period:  2023.  Periods were regular. light bleeding. Cramping is mild.   She did not take OTC analgesics for cramps.  No significant bloating/fluid retention, pelvic pain, or dyspareunia. No vaginal discharge  Post-menopausal bleeding: No  Urinary incontinence: No  Folate intake: N/A     Health Maintenance  Advanced directive: N/A   Osteoporosis Screen/ DEXA: N/A   PT for falls prevention: N/A   Cholesterol Screening: Screened   Diabetes Screening: Screened   Aspirin Use: N/A     Anticipatory Guidance  Diet: No restrictions, regular diet.   Exercise: Walks daily 30 minutes.   Substance Abuse: No.   Safe in relationship.   Seat belts, bike helmet, gun safety discussed.  Sun protection used.  Dental Home.    Cancer screening  Colorectal Cancer Screening: Up to date at age 51.    Lung Cancer Screening: N/A   Cervical Cancer Screening: Up to date. Done in .   Breast Cancer Screening: Yearly.     Infectious disease screening/Immunizations  --STI Screening: N/A   --Practices safe sex.  --HIV Screening: N/A   --Hepatitis C Screening: Screened   --Immunizations:    Influenza: Yearly    HPV: N/A    Tetanus: Up-to-date    Shingles: n/a    Pneumococcal : N/A       Hepatitis B: Up-to-date  COVID-19: Up-to-date  Other immunizations: N/A     She  has a past medical history of Depression, Hyperprolactinemia (HCC), Mixed hyperlipidemia (2/15/2022), Pituitary lesion (HCC), and Schizophrenia (HCC).    She has no past medical history of  Arrhythmia, Asthma, Blood transfusion without reported diagnosis, Cancer (HCC), Cataract, CHF (congestive heart failure) (HCC), Clotting disorder (HCC), COPD (chronic obstructive pulmonary disease) (HCC), Diabetes (HCC), Diabetic neuropathy (HCC), GERD (gastroesophageal reflux disease), Glaucoma, Heart attack (HCC), Heart murmur, Hypertension, Kidney disease, Migraine, Pulmonary emphysema (HCC), Seizure (HCC), Stroke (HCC), Substance abuse (HCC), or Thyroid disease.  She  has a past surgical history that includes gyn surgery; repeat c section w tubal ligation (6/20/2013); primary c section; and pelviscopy.    Family History   Problem Relation Age of Onset    Lung Disease Mother         emphysema    Kidney stones Mother        Social History     Socioeconomic History    Marital status:      Spouse name: Not on file    Number of children: Not on file    Years of education: Not on file    Highest education level: 8th grade   Occupational History    Not on file   Tobacco Use    Smoking status: Never    Smokeless tobacco: Never   Vaping Use    Vaping status: Never Used   Substance and Sexual Activity    Alcohol use: No    Drug use: No    Sexual activity: Not Currently     Birth control/protection: Surgical     Comment: BTL'   Other Topics Concern    Not on file   Social History Narrative    Not on file     Social Determinants of Health     Financial Resource Strain: Low Risk  (2/11/2024)    Overall Financial Resource Strain (CARDIA)     Difficulty of Paying Living Expenses: Not very hard   Food Insecurity: No Food Insecurity (2/11/2024)    Hunger Vital Sign     Worried About Running Out of Food in the Last Year: Never true     Ran Out of Food in the Last Year: Never true   Transportation Needs: No Transportation Needs (2/11/2024)    PRAPARE - Transportation     Lack of Transportation (Medical): No     Lack of Transportation (Non-Medical): No   Physical Activity: Not on file   Stress: Not on file   Social  Connections: Moderately Integrated (2/11/2024)    Social Connection and Isolation Panel [NHANES]     Frequency of Communication with Friends and Family: More than three times a week     Frequency of Social Gatherings with Friends and Family: Three times a week     Attends Christian Services: More than 4 times per year     Active Member of Clubs or Organizations: No     Attends Club or Organization Meetings: Never     Marital Status:    Intimate Partner Violence: Not on file   Housing Stability: High Risk (2/11/2024)    Housing Stability Vital Sign     Unable to Pay for Housing in the Last Year: Yes     Number of Places Lived in the Last Year: 1     Unstable Housing in the Last Year: No       Patient Active Problem List    Diagnosis Date Noted    Prediabetes 02/15/2022    Mixed hyperlipidemia 02/15/2022    Seasonal allergies 10/14/2021    Pituitary adenoma ([facility]) 01/21/2019    Pituitary lesion ([facility]) 05/15/2018    Hyperprolactinemia ([facility]) 05/30/2017    Other schizophrenia ([facility]) 12/15/2015    Insomnia 10/30/2013    Major depression 10/03/2013         Current Outpatient Medications   Medication Sig Dispense Refill    cabergoline (DOSTINEX) 0.5 MG tablet Take 2 Tablets by mouth two times a week. 48 Tablet 6    traZODone (DESYREL) 100 MG Tab trazodone 100 mg tablet   Take 1 tablet twice a day by oral route.      risperidone (RISPERDAL) 2 MG Tab Take 1 Tab by mouth every bedtime. 30 Tab 2    citalopram (CELEXA) 20 MG Tab Take 1 Tab by mouth every day. TOME JON TABLETA POR VIA ORAL TODOS LOS CORONADO 30 Tab 2     No current facility-administered medications for this visit.     No Known Allergies    Review of Systems   Constitutional: Negative for fever, chills and malaise/fatigue.   HENT: Negative for congestion.    Eyes: Negative for pain.    Respiratory: Negative for cough and shortness of breath.  Cardiovascular: Negative for leg swelling.   Gastrointestinal: Negative for nausea, vomiting, abdominal pain and diarrhea.  "  Genitourinary: Negative for dysuria and hematuria.   Skin: Negative for rash.   Neurological: Negative for dizziness, focal weakness and headaches.   Endo/Heme/Allergies: Does not bleed easily.   Psychiatric/Behavioral: Negative for depression.  The patient is not nervous/anxious.      Objective:     BP 99/55 (BP Location: Right arm, Patient Position: Sitting, BP Cuff Size: Adult)   Pulse 71   Temp 37.1 °C (98.7 °F) (Temporal)   Ht 1.575 m (5' 2\")   Wt 72.6 kg (160 lb)   SpO2 98%   BMI 29.26 kg/m²   Body mass index is 29.26 kg/m².  Wt Readings from Last 4 Encounters:   09/16/24 72.6 kg (160 lb)   03/11/24 73.5 kg (162 lb)   02/12/24 75.3 kg (166 lb)   11/30/23 75.4 kg (166 lb 3.2 oz)       Physical Exam:  Constitutional: Well-developed and well-nourished. Not diaphoretic. No distress.   Skin: Skin is warm and dry. No rash noted.  Head: Atraumatic without lesions.  Eyes: Conjunctivae and extraocular motions are normal. Pupils are equal, round, and reactive to light. No scleral icterus.   Ears:  External ears unremarkable. Tympanic membranes clear and intact.  Nose: Nares patent. Septum midline. Turbinates without erythema nor edema. No discharge.   Mouth/Throat: Dentition is good. Tongue normal. Oropharynx is clear and moist. Posterior pharynx without erythema or exudates.  Neck: Supple, trachea midline. Normal range of motion. No thyromegaly present. No lymphadenopathy--cervical or supraclavicular.  Cardiovascular: Regular rate and rhythm, S1 and S2 without murmur, rubs, or gallops.  Lungs: Normal inspiratory effort, CTA bilaterally, no wheezes/rhonchi/rales  Breast: Deferred  Abdomen: Soft, non tender, and without distention. Active bowel sounds in all four quadrants. No rebound, guarding, masses or HSM.  : Deferred  Extremities: No cyanosis, clubbing, erythema, nor edema. Distal pulses intact and symmetric.   Musculoskeletal: All major joints AROM full in all directions without pain.  Neurological: Alert " and oriented x 3. DTRs 2+/3 and symmetric. No cranial nerve deficit. 5/5 myotomes. Sensation intact.   Psychiatric:  Behavior, mood, and affect are appropriate.    A chaperone was offered to the patient during today's exam.: Patient declined.    Assessment and Plan:     1. Wellness examination  -Present today for annual examination visit  -Chronic problems are listed below    2. Pituitary adenoma (HCC)  3. Hyperprolactinemia (HCC)  -Chronic, stable  -Following with endocrinologist  -Currently on cabergoline 1 mg twice a week  -She is scheduled for lab work prior to seeing endocrinologist in mid November    4. Severe episode of recurrent major depressive disorder, with psychotic features (HCC)  -Chronic, stable  -Currently on Celexa and Risperdal  -Continue same therapy  -Patient denies any SI or thoughts of harming herself or others    5. Mixed hyperlipidemia  -Chronic, stable  -Managed with diet and exercise  -Lab work from last year showed elevated triglycerides and mild elevated LDL  -Repeat labs  -Encourage weight loss, regular exercise, DASH diet or plant-based diet  - Comp Metabolic Panel; Future  - Lipid Profile; Future    6. Insomnia, unspecified type  -Chronic, stable  -Currently on trazodone 100 mg at night has needed  -Continue same therapy    HCM: N/A  Labs per orders  Immunizations per orders  Patient counseled about skin care, diet, supplements, prenatal vitamins, safe sex and exercise.      Follow-up: Return in about 6 months (around 3/16/2025) for Labs, Hyperchol.

## 2024-10-17 ENCOUNTER — OFFICE VISIT (OUTPATIENT)
Dept: MEDICAL GROUP | Age: 54
End: 2024-10-17
Payer: COMMERCIAL

## 2024-10-17 VITALS
HEART RATE: 77 BPM | DIASTOLIC BLOOD PRESSURE: 70 MMHG | OXYGEN SATURATION: 97 % | SYSTOLIC BLOOD PRESSURE: 110 MMHG | TEMPERATURE: 98.6 F | BODY MASS INDEX: 29.81 KG/M2 | HEIGHT: 62 IN | WEIGHT: 162 LBS

## 2024-10-17 DIAGNOSIS — G47.00 INSOMNIA, UNSPECIFIED TYPE: ICD-10-CM

## 2024-10-17 DIAGNOSIS — H81.13 BENIGN PAROXYSMAL POSITIONAL VERTIGO DUE TO BILATERAL VESTIBULAR DISORDER: ICD-10-CM

## 2024-10-17 PROCEDURE — 3074F SYST BP LT 130 MM HG: CPT | Performed by: STUDENT IN AN ORGANIZED HEALTH CARE EDUCATION/TRAINING PROGRAM

## 2024-10-17 PROCEDURE — 3078F DIAST BP <80 MM HG: CPT | Performed by: STUDENT IN AN ORGANIZED HEALTH CARE EDUCATION/TRAINING PROGRAM

## 2024-10-17 PROCEDURE — 99214 OFFICE O/P EST MOD 30 MIN: CPT | Performed by: STUDENT IN AN ORGANIZED HEALTH CARE EDUCATION/TRAINING PROGRAM

## 2024-10-17 ASSESSMENT — ENCOUNTER SYMPTOMS
SHORTNESS OF BREATH: 0
HEADACHES: 0
DOUBLE VISION: 0
COUGH: 0
DIZZINESS: 0
FEVER: 0
WEAKNESS: 0
PALPITATIONS: 0
CHILLS: 0
DEPRESSION: 0
BLURRED VISION: 0
BRUISES/BLEEDS EASILY: 0
BLOOD IN STOOL: 0
ABDOMINAL PAIN: 0
SINUS PAIN: 0
BACK PAIN: 0
ORTHOPNEA: 0

## 2024-10-25 ENCOUNTER — PHYSICAL THERAPY (OUTPATIENT)
Dept: PHYSICAL THERAPY | Facility: MEDICAL CENTER | Age: 54
End: 2024-10-25
Attending: STUDENT IN AN ORGANIZED HEALTH CARE EDUCATION/TRAINING PROGRAM
Payer: COMMERCIAL

## 2024-10-25 DIAGNOSIS — H81.13 BENIGN PAROXYSMAL POSITIONAL VERTIGO DUE TO BILATERAL VESTIBULAR DISORDER: ICD-10-CM

## 2024-10-25 PROCEDURE — 97162 PT EVAL MOD COMPLEX 30 MIN: CPT

## 2024-10-25 PROCEDURE — 95992 CANALITH REPOSITIONING PROC: CPT

## 2024-10-25 ASSESSMENT — ENCOUNTER SYMPTOMS
PAIN SCALE AT LOWEST: 0
PAIN SCALE AT HIGHEST: 5
PAIN SCALE: 0

## 2024-11-01 ENCOUNTER — APPOINTMENT (OUTPATIENT)
Dept: PHYSICAL THERAPY | Facility: MEDICAL CENTER | Age: 54
End: 2024-11-01
Attending: STUDENT IN AN ORGANIZED HEALTH CARE EDUCATION/TRAINING PROGRAM
Payer: COMMERCIAL

## 2024-11-08 ENCOUNTER — APPOINTMENT (OUTPATIENT)
Dept: PHYSICAL THERAPY | Facility: MEDICAL CENTER | Age: 54
End: 2024-11-08
Attending: STUDENT IN AN ORGANIZED HEALTH CARE EDUCATION/TRAINING PROGRAM
Payer: COMMERCIAL

## 2024-11-09 ENCOUNTER — HOSPITAL ENCOUNTER (OUTPATIENT)
Dept: LAB | Facility: MEDICAL CENTER | Age: 54
End: 2024-11-09
Payer: COMMERCIAL

## 2024-11-09 DIAGNOSIS — D35.2 PITUITARY ADENOMA (HCC): ICD-10-CM

## 2024-11-09 DIAGNOSIS — E22.1 HYPERPROLACTINEMIA (HCC): ICD-10-CM

## 2024-11-09 LAB
CORTIS SERPL-MCNC: 7.7 UG/DL (ref 0–23)
PROLACTIN SERPL-MCNC: 25.9 NG/ML (ref 2.8–26)
T4 FREE SERPL-MCNC: 1.2 NG/DL (ref 0.93–1.7)
TSH SERPL-ACNC: 1.7 UIU/ML (ref 0.35–5.5)

## 2024-11-09 PROCEDURE — 82024 ASSAY OF ACTH: CPT

## 2024-11-09 PROCEDURE — 84146 ASSAY OF PROLACTIN: CPT

## 2024-11-09 PROCEDURE — 36415 COLL VENOUS BLD VENIPUNCTURE: CPT

## 2024-11-09 PROCEDURE — 84443 ASSAY THYROID STIM HORMONE: CPT

## 2024-11-09 PROCEDURE — 84305 ASSAY OF SOMATOMEDIN: CPT

## 2024-11-09 PROCEDURE — 84439 ASSAY OF FREE THYROXINE: CPT

## 2024-11-09 PROCEDURE — 82533 TOTAL CORTISOL: CPT

## 2024-11-12 LAB
ACTH PLAS-MCNC: 18.3 PG/ML (ref 7.2–63.3)
IGF-I SERPL-MCNC: 82 NG/ML (ref 52–233)
IGF-I Z-SCORE SERPL: -1

## 2024-11-19 ENCOUNTER — APPOINTMENT (OUTPATIENT)
Dept: ENDOCRINOLOGY | Facility: MEDICAL CENTER | Age: 54
End: 2024-11-19
Payer: COMMERCIAL

## 2024-11-22 ENCOUNTER — APPOINTMENT (OUTPATIENT)
Dept: PHYSICAL THERAPY | Facility: MEDICAL CENTER | Age: 54
End: 2024-11-22
Attending: STUDENT IN AN ORGANIZED HEALTH CARE EDUCATION/TRAINING PROGRAM
Payer: COMMERCIAL

## 2024-11-22 ENCOUNTER — APPOINTMENT (OUTPATIENT)
Dept: ENDOCRINOLOGY | Facility: MEDICAL CENTER | Age: 54
End: 2024-11-22
Payer: COMMERCIAL

## 2024-11-26 DIAGNOSIS — E22.1 HYPERPROLACTINEMIA (HCC): ICD-10-CM

## 2024-11-26 DIAGNOSIS — D35.2 PITUITARY ADENOMA (HCC): ICD-10-CM

## 2024-11-26 RX ORDER — CABERGOLINE 0.5 MG/1
1 TABLET ORAL
Qty: 48 TABLET | Refills: 6 | Status: SHIPPED | OUTPATIENT
Start: 2024-11-26

## 2024-11-26 NOTE — TELEPHONE ENCOUNTER
Received request via: Patient    Was the patient seen in the last year in this department? Yes    Does the patient have an active prescription (recently filled or refills available) for medication(s) requested?  Yes    Ph[facility] Name[facility]     Does the patient have [facility] and need 100-day supply? (This applies to ALL medications) Patient does not have SCP

## 2024-12-13 ENCOUNTER — APPOINTMENT (OUTPATIENT)
Dept: PHYSICAL THERAPY | Facility: MEDICAL CENTER | Age: 54
End: 2024-12-13
Attending: STUDENT IN AN ORGANIZED HEALTH CARE EDUCATION/TRAINING PROGRAM
Payer: COMMERCIAL

## 2024-12-18 ENCOUNTER — TELEPHONE (OUTPATIENT)
Dept: PHYSICAL THERAPY | Facility: MEDICAL CENTER | Age: 54
End: 2024-12-18
Payer: COMMERCIAL

## 2024-12-18 NOTE — OP THERAPY DISCHARGE SUMMARY
Outpatient Physical Therapy  DISCHARGE SUMMARY NOTE      [facility]  [address]  [place] [address]  Phone:  [phone]  Fax:  [phone]    Date of Visit: 12/18/2024    Patient: Amy Ugalde  YOB: 1970  MRN: [id number]     Referring Provider: John Pillai M.D.  [address] [place] [address] [place] [address]   Referring Diagnosis Benign paroxysmal positional vertigo due to bilateral vestibular disorder [H81.13]          Functional Assessment Used        Your patient is being discharged from Physical Therapy with the following comments:   Other    Comments:  Amy has not returned for PT follow up since her evaluation on 10/25/24, during which she was treated for BPPV. See that date of service for details. Discharging due to lapse, but welcome to return with a new referral if it is necessary to re-establish care.         Binta Noe, PT, DPT    Date: 12/18/2024

## 2025-03-08 ENCOUNTER — HOSPITAL ENCOUNTER (OUTPATIENT)
Dept: LAB | Facility: MEDICAL CENTER | Age: 55
End: 2025-03-08
Attending: STUDENT IN AN ORGANIZED HEALTH CARE EDUCATION/TRAINING PROGRAM
Payer: COMMERCIAL

## 2025-03-08 DIAGNOSIS — E78.2 MIXED HYPERLIPIDEMIA: ICD-10-CM

## 2025-03-08 LAB
ALBUMIN SERPL BCP-MCNC: 4.2 G/DL (ref 3.2–4.9)
ALBUMIN/GLOB SERPL: 1.1 G/DL
ALP SERPL-CCNC: 108 U/L (ref 30–99)
ALT SERPL-CCNC: 22 U/L (ref 2–50)
ANION GAP SERPL CALC-SCNC: 10 MMOL/L (ref 7–16)
AST SERPL-CCNC: 25 U/L (ref 12–45)
BILIRUB SERPL-MCNC: 0.3 MG/DL (ref 0.1–1.5)
BUN SERPL-MCNC: 9 MG/DL (ref 8–22)
CALCIUM ALBUM COR SERPL-MCNC: 9.6 MG/DL (ref 8.5–10.5)
CALCIUM SERPL-MCNC: 9.8 MG/DL (ref 8.5–10.5)
CHLORIDE SERPL-SCNC: 108 MMOL/L (ref 96–112)
CHOLEST SERPL-MCNC: 187 MG/DL (ref 100–199)
CO2 SERPL-SCNC: 23 MMOL/L (ref 20–33)
CREAT SERPL-MCNC: 0.68 MG/DL (ref 0.5–1.4)
GFR SERPLBLD CREATININE-BSD FMLA CKD-EPI: 103 ML/MIN/1.73 M 2
GLOBULIN SER CALC-MCNC: 3.7 G/DL (ref 1.9–3.5)
GLUCOSE SERPL-MCNC: 102 MG/DL (ref 65–99)
HDLC SERPL-MCNC: 30 MG/DL
LDLC SERPL CALC-MCNC: 109 MG/DL
POTASSIUM SERPL-SCNC: 4.2 MMOL/L (ref 3.6–5.5)
PROT SERPL-MCNC: 7.9 G/DL (ref 6–8.2)
SODIUM SERPL-SCNC: 141 MMOL/L (ref 135–145)
TRIGL SERPL-MCNC: 242 MG/DL (ref 0–149)

## 2025-03-08 PROCEDURE — 80061 LIPID PANEL: CPT

## 2025-03-08 PROCEDURE — 36415 COLL VENOUS BLD VENIPUNCTURE: CPT

## 2025-03-08 PROCEDURE — 80053 COMPREHEN METABOLIC PANEL: CPT

## 2025-03-11 ENCOUNTER — OFFICE VISIT (OUTPATIENT)
Dept: ENDOCRINOLOGY | Facility: MEDICAL CENTER | Age: 55
End: 2025-03-11
Attending: INTERNAL MEDICINE
Payer: COMMERCIAL

## 2025-03-11 VITALS
DIASTOLIC BLOOD PRESSURE: 66 MMHG | HEIGHT: 62 IN | WEIGHT: 157 LBS | OXYGEN SATURATION: 98 % | HEART RATE: 64 BPM | SYSTOLIC BLOOD PRESSURE: 124 MMHG | BODY MASS INDEX: 28.89 KG/M2

## 2025-03-11 DIAGNOSIS — E55.9 VITAMIN D DEFICIENCY: ICD-10-CM

## 2025-03-11 DIAGNOSIS — E22.1 HYPERPROLACTINEMIA (HCC): ICD-10-CM

## 2025-03-11 DIAGNOSIS — D35.2 PITUITARY ADENOMA (HCC): ICD-10-CM

## 2025-03-11 PROCEDURE — 99214 OFFICE O/P EST MOD 30 MIN: CPT | Performed by: INTERNAL MEDICINE

## 2025-03-11 PROCEDURE — 99211 OFF/OP EST MAY X REQ PHY/QHP: CPT | Performed by: INTERNAL MEDICINE

## 2025-03-11 PROCEDURE — 3078F DIAST BP <80 MM HG: CPT | Performed by: INTERNAL MEDICINE

## 2025-03-11 PROCEDURE — 3074F SYST BP LT 130 MM HG: CPT | Performed by: INTERNAL MEDICINE

## 2025-03-11 NOTE — PROGRESS NOTES
Chief Complaint: Follow up for hyperprolactinemia secondary to a pituitary adenoma    HPI:     Amy Ugalde is a 54 y.o. female here for follow up of the above medical issue    She was first seen by Dr. Valle in 2017.  Her baseline prolactin was 76 she was taking Risperdal for psychosis which technically can also cause hyperprolactinemia.  But the patient had a 10 mm pituitary adenoma on MRI in 2018.  At that time Dr. Valle had a discussion with the patient psychiatrist Dr. Quick who mutually agreed that there was no contraindication to treatment with cabergoline and that it would not interfere with the antipsychotic effects of Risperdal.  Thus Dr. Valle decided to treat the patient.      It is important to note that the patient at that time also had cessation of menses and galactorrhea with her hyperprolactinemia.    When she started cabergoline her menses resumed and galactorrhea stopped.      She has been on cabergoline 0.5 mg/tab. 1 tablet 2 times a week since 2018  She reports good compliance    She was seen by the nurse practitioner Dave Pham in November 2023 and was advised to continue her medications   She has also followed up with Dr. Jose Phillips at [facility] neurosurgery and has been getting MRIs at their institution          Currently she denies galactorrhea headaches and double vision  Her most recent prolactin is 25 on November 9, 2024    Prior to this prolactin was 19 on November 18, 2023        Her last MRI on August 7, 2024 showed a reduction of the size of her pituitary microadenoma from 10 mm to 6 mm        Patient's medications, allergies, and social histories were reviewed and updated as appropriate.      ROS:     CONS:     No fever, no chills   EYES:     No diplopia, no blurry vision   CV:           No chest pain, no palpitations   PULM:     No SOB, no cough, no hemoptysis.   GI:            No nausea, no vomiting, no diarrhea, no constipation   ENDO:     No polyuria, no  "polydipsia, no heat intolerance, no cold intolerance       Past Medical History:  Problem List:  2022-04: Ingrown toenail  2022-02: Prediabetes  2022-02: Mixed hyperlipidemia  2021-11: Sensation of fullness in both ears  2021-10: Seasonal allergies  2021-04: Skin lesions  2019-01: Pituitary adenoma ([facility]  2018-05: Pituitary lesion ([facility])  2017-05: Hyperprolactinemia ([facility])  2016-01: Elevated prolactin level  2016-01: Amenorrhea  2016-01: Bilateral nipple discharge  2015-12: Other schizophrenia ([facility])  2013-10: Insomnia  2013-10: Major depression  2013-10: Psychosis ([facility])  2013-06: Labor and delivery, indication for care  2012-08: Pituitary mass ([facility])      Past Surgical History:  Past Surgical History:   Procedure Laterality Date    REPEAT C SECTION W TUBAL LIGATION  6/20/2013    Performed by Marylu Sanchez M.D. at [facility]    GYN SURGERY      csection    PELVISCOPY      for fertility    PRIMARY C SECTION          Allergies:  Patient has no known allergies.     Social History:  Social History     Tobacco Use    Smoking status: Never    Smokeless tobacco: Never   Vaping Use    Vaping status: Never Used   Substance Use Topics    Alcohol use: No    Drug use: No        Family History:   family history includes Kidney stones in her mother; Lung Disease in her mother.      PHYSICAL EXAM:   Vital signs: /66 (BP Location: Left arm, Patient Position: Sitting, BP Cuff Size: Adult)   Pulse 64   Ht 1.575 m (5' 2\")   Wt 71.2 kg (157 lb)   SpO2 98%   BMI 28.72 kg/m²   GENERAL: Well-developed, well-nourished in no apparent distress.   EYE:  No ocular asymmetry, PERRLA  HENT: Pink, moist mucous membranes.    NECK: No thyromegaly.   CARDIOVASCULAR:  No murmurs  LUNGS: Clear breath sounds  ABDOMEN: Soft, nontender   EXTREMITIES: No clubbing, cyanosis, or edema.   NEUROLOGICAL: No gross focal motor abnormalities   LYMPH: No cervical adenopathy palpated.   SKIN: No rashes, lesions.       Labs:  Lab Results " "  Component Value Date/Time    SODIUM 141 03/08/2025 08:04 AM    POTASSIUM 4.2 03/08/2025 08:04 AM    CHLORIDE 108 03/08/2025 08:04 AM    CO2 23 03/08/2025 08:04 AM    ANION 10.0 03/08/2025 08:04 AM    GLUCOSE 102 (H) 03/08/2025 08:04 AM    BUN 9 03/08/2025 08:04 AM    CREATININE 0.68 03/08/2025 08:04 AM    CALCIUM 9.8 03/08/2025 08:04 AM    ASTSGOT 25 03/08/2025 08:04 AM    ALTSGPT 22 03/08/2025 08:04 AM    TBILIRUBIN 0.3 03/08/2025 08:04 AM    ALBUMIN 4.2 03/08/2025 08:04 AM    TOTPROTEIN 7.9 03/08/2025 08:04 AM    GLOBULIN 3.7 (H) 03/08/2025 08:04 AM    AGRATIO 1.1 03/08/2025 08:04 AM       Lab Results   Component Value Date/Time    SODIUM 141 03/08/2025 0804    POTASSIUM 4.2 03/08/2025 0804    CHLORIDE 108 03/08/2025 0804    CO2 23 03/08/2025 0804    GLUCOSE 102 (H) 03/08/2025 0804    BUN 9 03/08/2025 0804    CREATININE 0.68 03/08/2025 0804    CALCIUM 9.8 03/08/2025 0804    ANION 10.0 03/08/2025 0804       Lab Results   Component Value Date/Time    CHOLSTRLTOT 187 03/08/2025 0804    TRIGLYCERIDE 242 (H) 03/08/2025 0804    HDL 30 (A) 03/08/2025 0804     (H) 03/08/2025 0804       Lab Results   Component Value Date/Time    TSHULTRASEN 1.700 11/09/2024 0858     Lab Results   Component Value Date/Time    FREET4 1.20 11/09/2024 0858     No results found for: \"FREET3\"  No results found for: \"THYSTIMIG\"    No results found for: \"MICROSOMALA\"      Imaging:      ASSESSMENT/PLAN:     1. Hyperprolactinemia (HCC)  Controlled  There has been some question in the past whether her elevated prolactin was really from her microadenoma versus an effect from her Risperdal    Unfortunately it does not appear that her psychiatrist was willing to change her medication at that time and so Dr. Valle had no choice but to start the patient on cabergoline    So far the patient's prolactin has been controlled and the medication does not appear to be interfering with the antipsychotic effects of Risperdal    I want her to continue " cabergoline 0.5 mg 1 tablet 2 times a week  I did note that there was a discrepancy on the prescription prescribed by Dr. Pillai as it indicated 2 tablets 2 times a week I explained to the patient that I want her to take 1 tablet 2 times a week    Repeat prolactin in 6 months    2. Pituitary adenoma (HCC)  Stable she has a probable micro prolactinoma  MRI showed decrease in the size of the pituitary tumor with cabergoline therapy  Recommend observation repeating MRI next year    3. Vitamin D deficiency  Stable check calcium vitamin D with future labs      Return in about 7 months (around 10/11/2025).      Chris Currie MD, FACE, NU    CC:   John Pillai M.D.

## 2025-03-17 ENCOUNTER — OFFICE VISIT (OUTPATIENT)
Dept: MEDICAL GROUP | Age: 55
End: 2025-03-17
Payer: COMMERCIAL

## 2025-03-17 VITALS
BODY MASS INDEX: 29.08 KG/M2 | TEMPERATURE: 98.1 F | DIASTOLIC BLOOD PRESSURE: 62 MMHG | SYSTOLIC BLOOD PRESSURE: 120 MMHG | WEIGHT: 158 LBS | HEART RATE: 68 BPM | HEIGHT: 62 IN | OXYGEN SATURATION: 96 %

## 2025-03-17 DIAGNOSIS — E78.2 MIXED HYPERLIPIDEMIA: ICD-10-CM

## 2025-03-17 DIAGNOSIS — Z12.31 ENCOUNTER FOR SCREENING MAMMOGRAM FOR MALIGNANT NEOPLASM OF BREAST: ICD-10-CM

## 2025-03-17 DIAGNOSIS — R73.03 PREDIABETES: ICD-10-CM

## 2025-03-17 DIAGNOSIS — D35.2 PITUITARY ADENOMA (HCC): ICD-10-CM

## 2025-03-17 PROCEDURE — 99214 OFFICE O/P EST MOD 30 MIN: CPT | Performed by: STUDENT IN AN ORGANIZED HEALTH CARE EDUCATION/TRAINING PROGRAM

## 2025-03-17 PROCEDURE — 3078F DIAST BP <80 MM HG: CPT | Performed by: STUDENT IN AN ORGANIZED HEALTH CARE EDUCATION/TRAINING PROGRAM

## 2025-03-17 PROCEDURE — 3074F SYST BP LT 130 MM HG: CPT | Performed by: STUDENT IN AN ORGANIZED HEALTH CARE EDUCATION/TRAINING PROGRAM

## 2025-03-17 ASSESSMENT — ENCOUNTER SYMPTOMS
ABDOMINAL PAIN: 0
COUGH: 0
ORTHOPNEA: 0
BLURRED VISION: 0
FEVER: 0
BRUISES/BLEEDS EASILY: 0
HEADACHES: 0
SHORTNESS OF BREATH: 0
WHEEZING: 0
BACK PAIN: 0
DIZZINESS: 0
DEPRESSION: 0
PALPITATIONS: 0
CHILLS: 0
BLOOD IN STOOL: 0
DOUBLE VISION: 0
WEAKNESS: 0

## 2025-03-17 ASSESSMENT — LIFESTYLE VARIABLES: SUBSTANCE_ABUSE: 0

## 2025-03-17 NOTE — PROGRESS NOTES
"Subjective:     CC: Lab review follow-up    HPI:   Amy presents today for discussion of is here for another review follow-up.   Routine lab work showed borderline elevated fasting blood glucose on metabolic panel, lipid profile showed  elevated triglycerides and LDL cholesterol.  Patient otherwise states feeling well, does not have any acute complaints.    ROS:  Review of Systems   Constitutional:  Negative for chills, fever and malaise/fatigue.   HENT:  Negative for nosebleeds and tinnitus.    Eyes:  Negative for blurred vision and double vision.   Respiratory:  Negative for cough, shortness of breath and wheezing.    Cardiovascular:  Negative for chest pain, palpitations, orthopnea and leg swelling.   Gastrointestinal:  Negative for abdominal pain, blood in stool and melena.   Genitourinary:  Negative for dysuria, frequency and urgency.   Musculoskeletal:  Negative for back pain and joint pain.   Skin:  Negative for rash.   Neurological:  Negative for dizziness, weakness and headaches.   Endo/Heme/Allergies:  Does not bruise/bleed easily.   Psychiatric/Behavioral:  Negative for depression, substance abuse and suicidal ideas.        Objective:     Exam:  /62 (BP Location: Right arm, Patient Position: Sitting, BP Cuff Size: Adult)   Pulse 68   Temp 36.7 °C (98.1 °F) (Temporal)   Ht 1.575 m (5' 2\")   Wt 71.7 kg (158 lb)   SpO2 96%   BMI 28.90 kg/m²  Body mass index is 28.9 kg/m².    Physical Exam  Vitals reviewed.   Constitutional:       General: She is not in acute distress.  HENT:      Head: Normocephalic and atraumatic.      Mouth/Throat:      Mouth: Mucous membranes are moist.   Eyes:      General: No scleral icterus.     Extraocular Movements: Extraocular movements intact.      Pupils: Pupils are equal, round, and reactive to light.   Cardiovascular:      Rate and Rhythm: Normal rate and regular rhythm.      Pulses: Normal pulses.      Heart sounds: Normal heart sounds. No murmur " heard.  Pulmonary:      Effort: Pulmonary effort is normal. No respiratory distress.      Breath sounds: Normal breath sounds. No wheezing.   Abdominal:      General: There is no distension.      Palpations: Abdomen is soft.      Tenderness: There is no abdominal tenderness. There is no guarding or rebound.   Musculoskeletal:      Cervical back: Normal range of motion and neck supple.      Right lower leg: No edema.      Left lower leg: No edema.   Skin:     General: Skin is warm.      Capillary Refill: Capillary refill takes less than 2 seconds.      Coloration: Skin is not jaundiced.      Findings: No bruising.   Neurological:      General: No focal deficit present.      Mental Status: She is alert.      Cranial Nerves: No cranial nerve deficit.      Sensory: No sensory deficit.   Psychiatric:         Mood and Affect: Mood normal.         Behavior: Behavior normal.       Labs: Reviewed    Assessment & Plan:     55 y.o. female with the following -     1. Pituitary adenoma (HCC)  -Chronic, stable  -Following with endocrinologist  -Currently on cabergoline 1 mg twice a week    2. Mixed hyperlipidemia  -Chronic, stable  -Unchanged profile, showed mildly elevated LDL and elevated triglycerides.  -Encouraged weight loss, regular exercise, plant-based diet or DASH diet.    3. Prediabetes  -History of prediabetes  -Current metabolic panel show borderline elevated fasting blood glucose.  -Most recent A1c performed in March of last year was 5.6% however previously elevated.    4. Encounter for screening mammogram for malignant neoplasm of breast  -Routine screening  - MA-SCREENING MAMMO BILAT W/TOMOSYNTHESIS W/CAD; Future     Return in about 6 months (around 9/17/2025) for Meds.    Please note that this dictation was created using voice recognition software. I have made every reasonable attempt to correct obvious errors, but I expect that there are errors of grammar and possibly content that I did not discover before  finalizing the note.

## 2025-03-31 ENCOUNTER — HOSPITAL ENCOUNTER (OUTPATIENT)
Dept: RADIOLOGY | Facility: MEDICAL CENTER | Age: 55
End: 2025-03-31
Attending: STUDENT IN AN ORGANIZED HEALTH CARE EDUCATION/TRAINING PROGRAM
Payer: COMMERCIAL

## 2025-03-31 DIAGNOSIS — Z12.31 ENCOUNTER FOR SCREENING MAMMOGRAM FOR MALIGNANT NEOPLASM OF BREAST: ICD-10-CM

## 2025-03-31 PROCEDURE — 77067 SCR MAMMO BI INCL CAD: CPT
